# Patient Record
Sex: MALE | Race: WHITE | NOT HISPANIC OR LATINO | Employment: FULL TIME | URBAN - METROPOLITAN AREA
[De-identification: names, ages, dates, MRNs, and addresses within clinical notes are randomized per-mention and may not be internally consistent; named-entity substitution may affect disease eponyms.]

---

## 2019-12-26 ENCOUNTER — TRANSFERRED RECORDS (OUTPATIENT)
Dept: HEALTH INFORMATION MANAGEMENT | Facility: CLINIC | Age: 19
End: 2019-12-26

## 2020-01-08 ENCOUNTER — HOSPITAL ENCOUNTER (OUTPATIENT)
Dept: PHYSICAL THERAPY | Facility: HOSPITAL | Age: 20
Setting detail: THERAPIES SERIES
End: 2020-01-08
Attending: NURSE PRACTITIONER
Payer: COMMERCIAL

## 2020-01-08 PROCEDURE — 97140 MANUAL THERAPY 1/> REGIONS: CPT | Mod: GP | Performed by: PHYSICAL THERAPIST

## 2020-01-08 PROCEDURE — 97161 PT EVAL LOW COMPLEX 20 MIN: CPT | Mod: GP | Performed by: PHYSICAL THERAPIST

## 2020-01-08 PROCEDURE — 97110 THERAPEUTIC EXERCISES: CPT | Mod: GP | Performed by: PHYSICAL THERAPIST

## 2020-01-09 NOTE — PROGRESS NOTES
01/08/20 1400   General Information   Type of Visit Initial OP Ortho PT Evaluation   Start of Care Date 01/08/20   Referring Physician Carina GAGE   Patient/Family Goals Statement less pain, better mobility   Orders Evaluate and Treat   Date of Order 12/28/19   Certification Required? No   Medical Diagnosis Rib pain, pectus excavatum   Surgical/Medical history reviewed Yes   Precautions/Limitations no known precautions/limitations   Body Part(s)   Body Part(s) Cervical Spine   Presentation and Etiology   Pertinent history of current problem (include personal factors and/or comorbidities that impact the POC) Patient reports he has been experiencing increased pain and difficulty with posture over the past few years. He was congenital pectus excavatum and has not had any intervention to address the tissue and joint mobility restrictions associated with it. He states he does not sleep well at night, has headaches and has ongoing discomfort in his ribs. He had a massage with focus on getting his diaphragm to move better last week and states that helped with a lot of his pain. He feels his posture has improved. He is coming to PT to further address his pain and posture issues and see what else he can do to decrease his discomfort and mobility deficits. He has also had headaches for many years since [employer] and states he has just learned to deal with them. Feels his headaches sit at the base of his skull   Impairments A. Pain;N. Headaches   Functional Limitations perform activities of daily living;perform required work activities;perform desired leisure / sports activities   Symptom Location B chest, ribs, sternum, headaches   Onset date of current episode/exacerbation 12/28/19  (physician order date)   Chronicity Chronic   Pain rating (0-10 point scale) Best (/10);Worst (/10)   Best (/10) 1   Worst (/10) 4   Pain quality G. Cramping   Frequency of pain/symptoms C. With activity   Pain/symptoms are: Worse during  the night   Pain/symptoms exacerbated by A. Sitting;C. Lifting   Pain/symptoms eased by D. Nothing   Progression of symptoms since onset: Improved   Current Level of Function   Current Community Support Family/friend caregiver   Patient role/employment history A. Employed;B. 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 No   Have you fallen and had an injury in the past year? No   Is patient a fall risk? No   Cervical Spine   Observation pectus excavatum, straight thoracic spine, decreased thoracic kyphosis   Posture Rounded shoulders   Cervical Flexion ROM WFL + pull   Cervical Extension ROM WFL   Cervical Right Side Bending ROM min sanabria    Cervical Left Side Bending ROM min sanabria   Cervical Right Rotation ROM min sanabria   Cervical Left Rotation ROM min sanabria   Thoracic Flexion ROM mod sanabria + tightness in chest   Thoracic Extension ROM mod sanabria   Thoracic Right Side Bending ROM min sanabria   Thoracic Left Sidebending ROM  min sanabria   Thoracic Right Rotation min sanabria   Thoracic Left Rotation min sanabria   Shoulder AROM Screen WFL - decreased SA activity noted with IR with scapular winging   Upper Trapezius Flexibility hypo++   Levator Scapula Flexibility hypo   Scalene Flexibility hypo   Pectoralis Minor Flexibility hypo++   Vertebral Artery Test neg   Alar Ligament Test neg   Transverse Ligament Test neg   Cervical/Shoulder Special Tests Comments Breathing: upper chest breathing and decreased mobility of L ribs   Segmental Mobility-Cervical hypo at O-C1, C1-C2, CTJ   Segmental Mobility-Thoracic Hypo T1-T8   Palpation TTP and increased tone in B UTs, levator, rhomboids, pec minor and intercostals   Planned Therapy Interventions   Planned Therapy Interventions joint mobilization;manual therapy;ROM;strengthening;stretching   Planned Modality Interventions   Planned Modality Interventions  Comments as needed   Clinical Impression   Criteria for Skilled Therapeutic Interventions Met yes, treatment indicated   PT Diagnosis Rib pain, pectus excavatum   Influenced by the following impairments pain and tissue restrictions   Functional limitations due to impairments difficulty performing home, work and recreational activities   Clinical Presentation Stable/Uncomplicated   Clinical Presentation Rationale due to lack of additional comorbidities that may influence anticipated PT progress   Clinical Decision Making (Complexity) Low complexity   Therapy Frequency 2 times/Week   Predicted Duration of Therapy Intervention (days/wks) up to 8 weeks   Risk & Benefits of therapy have been explained Yes   Patient, Family & other staff in agreement with plan of care Yes   Clinical Impression Comments Presentation is consistent with B rib pain associated with pectus excavatum that is epxected to improve with skilled PT intervention   Education Assessment   Preferred Learning Style Demonstration   Barriers to Learning No barriers   ORTHO GOALS   PT Ortho Eval Goals 1;2;3   Ortho Goal 1   Goal Identifier STG 1   Goal Description Patient will be compliant with HEP in order to make progress while at home   Target Date 01/29/20   Ortho Goal 2   Goal Identifier LTG 1   Goal Description Patient will report overall 50% improvement in his rib pain and headaches in order to improve his safety with home, work and recreational tadks   Target Date 02/12/20   Ortho Goal 3   Goal Identifier LTG 2   Goal Description Patient will report overall 80% or more improvement in rib pain and headaches in order to perform all home, work and recreational tasks safely   Target Date 03/04/20   Total Evaluation Time   PT Vero Low Complexity Minutes (23222) 15

## 2020-01-20 ENCOUNTER — HOSPITAL ENCOUNTER (OUTPATIENT)
Dept: PHYSICAL THERAPY | Facility: HOSPITAL | Age: 20
Setting detail: THERAPIES SERIES
End: 2020-01-20
Attending: FAMILY MEDICINE
Payer: COMMERCIAL

## 2020-01-20 PROCEDURE — 97110 THERAPEUTIC EXERCISES: CPT | Mod: GP | Performed by: PHYSICAL THERAPIST

## 2020-01-20 PROCEDURE — 97140 MANUAL THERAPY 1/> REGIONS: CPT | Mod: GP | Performed by: PHYSICAL THERAPIST

## 2020-01-27 ENCOUNTER — HOSPITAL ENCOUNTER (OUTPATIENT)
Dept: PHYSICAL THERAPY | Facility: HOSPITAL | Age: 20
Setting detail: THERAPIES SERIES
End: 2020-01-27
Attending: FAMILY MEDICINE
Payer: COMMERCIAL

## 2020-01-27 PROCEDURE — 97140 MANUAL THERAPY 1/> REGIONS: CPT | Mod: GP | Performed by: PHYSICAL THERAPIST

## 2020-01-27 PROCEDURE — 97110 THERAPEUTIC EXERCISES: CPT | Mod: GP | Performed by: PHYSICAL THERAPIST

## 2020-01-31 ENCOUNTER — HOSPITAL ENCOUNTER (OUTPATIENT)
Dept: PHYSICAL THERAPY | Facility: HOSPITAL | Age: 20
Setting detail: THERAPIES SERIES
End: 2020-01-31
Attending: FAMILY MEDICINE
Payer: COMMERCIAL

## 2020-01-31 PROCEDURE — 97140 MANUAL THERAPY 1/> REGIONS: CPT | Mod: GP | Performed by: PHYSICAL THERAPIST

## 2020-01-31 PROCEDURE — 97110 THERAPEUTIC EXERCISES: CPT | Mod: GP | Performed by: PHYSICAL THERAPIST

## 2020-02-03 ENCOUNTER — HOSPITAL ENCOUNTER (OUTPATIENT)
Dept: PHYSICAL THERAPY | Facility: HOSPITAL | Age: 20
Setting detail: THERAPIES SERIES
End: 2020-02-03
Attending: FAMILY MEDICINE
Payer: COMMERCIAL

## 2020-02-03 PROCEDURE — 97110 THERAPEUTIC EXERCISES: CPT | Mod: GP | Performed by: PHYSICAL THERAPIST

## 2020-02-10 ENCOUNTER — HOSPITAL ENCOUNTER (OUTPATIENT)
Dept: PHYSICAL THERAPY | Facility: HOSPITAL | Age: 20
Setting detail: THERAPIES SERIES
End: 2020-02-10
Attending: FAMILY MEDICINE
Payer: COMMERCIAL

## 2020-02-10 PROCEDURE — 97140 MANUAL THERAPY 1/> REGIONS: CPT | Mod: GP | Performed by: PHYSICAL THERAPIST

## 2020-02-10 PROCEDURE — 97110 THERAPEUTIC EXERCISES: CPT | Mod: GP | Performed by: PHYSICAL THERAPIST

## 2020-02-14 ENCOUNTER — HOSPITAL ENCOUNTER (OUTPATIENT)
Dept: PHYSICAL THERAPY | Facility: HOSPITAL | Age: 20
Setting detail: THERAPIES SERIES
End: 2020-02-14
Attending: FAMILY MEDICINE
Payer: COMMERCIAL

## 2020-02-14 PROCEDURE — 97110 THERAPEUTIC EXERCISES: CPT | Mod: GP | Performed by: PHYSICAL THERAPIST

## 2020-02-14 PROCEDURE — 97140 MANUAL THERAPY 1/> REGIONS: CPT | Mod: GP | Performed by: PHYSICAL THERAPIST

## 2020-02-25 ENCOUNTER — HOSPITAL ENCOUNTER (OUTPATIENT)
Dept: PHYSICAL THERAPY | Facility: HOSPITAL | Age: 20
Setting detail: THERAPIES SERIES
End: 2020-02-25
Attending: FAMILY MEDICINE
Payer: COMMERCIAL

## 2020-02-25 PROCEDURE — 97140 MANUAL THERAPY 1/> REGIONS: CPT | Mod: GP | Performed by: PHYSICAL THERAPIST

## 2020-02-25 PROCEDURE — 97110 THERAPEUTIC EXERCISES: CPT | Mod: GP | Performed by: PHYSICAL THERAPIST

## 2020-03-02 ENCOUNTER — HOSPITAL ENCOUNTER (OUTPATIENT)
Dept: PHYSICAL THERAPY | Facility: HOSPITAL | Age: 20
Setting detail: THERAPIES SERIES
End: 2020-03-02
Attending: FAMILY MEDICINE
Payer: COMMERCIAL

## 2020-03-02 PROCEDURE — 97140 MANUAL THERAPY 1/> REGIONS: CPT | Mod: GP | Performed by: PHYSICAL THERAPIST

## 2020-03-02 PROCEDURE — 97110 THERAPEUTIC EXERCISES: CPT | Mod: GP | Performed by: PHYSICAL THERAPIST

## 2020-03-09 ENCOUNTER — HOSPITAL ENCOUNTER (OUTPATIENT)
Dept: PHYSICAL THERAPY | Facility: HOSPITAL | Age: 20
Setting detail: THERAPIES SERIES
End: 2020-03-09
Attending: FAMILY MEDICINE
Payer: COMMERCIAL

## 2020-03-09 PROCEDURE — 97110 THERAPEUTIC EXERCISES: CPT | Mod: GP | Performed by: PHYSICAL THERAPIST

## 2020-03-09 PROCEDURE — 97140 MANUAL THERAPY 1/> REGIONS: CPT | Mod: GP | Performed by: PHYSICAL THERAPIST

## 2020-03-09 NOTE — PROGRESS NOTES
Outpatient Physical Therapy Discharge Note     Patient: Julio Muñiz  : 2000    Beginning/End Dates of Reporting Period:  2020 to 3/9/2020    Referring Provider: Dr Crenshaw    Therapy Diagnosis: Rib pain, pectus excavatum     Client Self Report: States he has been feeling very good lately, no rib pain, his breathing has been good and he has been doing well with the exercises. He feels he has a good home program and a good plan to carry out at the gym. He will work on his own for a few months and return to the clinic if he has difficulty    Objective Measurements:  Objective Measure: Thoracic mobility   Details: Overall much improved thoracic mobility with no pain. Performs all HEP without difficulty. Reports 0/10 pain in ribs and chest today      Goals:  Goal Identifier STG 1   Goal Description Patient will be compliant with HEP in order to make progress while at home   Target Date 20   Date Met  20   Progress:     Goal Identifier LTG 1   Goal Description Patient will report overall 50% improvement in his rib pain and headaches in order to improve his safety with home, work and recreational tadks   Target Date 20   Date Met  20   Progress:     Goal Identifier LTG 2   Goal Description Patient will report overall 80% or more improvement in rib pain and headaches in order to perform all home, work and recreational tasks safely   Target Date 20   Date Met  20   Progress:       Progress Toward Goals:   Progress this reporting period: patient has met goals      Plan:  Discharge from therapy.    Discharge:    Reason for Discharge: No further expectation of progress.    Equipment Issued: None    Discharge Plan: Patient to continue home program and can return to PT if needed within the year timeframe

## 2020-05-02 ENCOUNTER — HOSPITAL ENCOUNTER (EMERGENCY)
Facility: HOSPITAL | Age: 20
Discharge: HOME | End: 2020-05-02
Attending: NURSE PRACTITIONER | Admitting: NURSE PRACTITIONER
Payer: COMMERCIAL

## 2020-05-02 VITALS
OXYGEN SATURATION: 99 % | SYSTOLIC BLOOD PRESSURE: 142 MMHG | TEMPERATURE: 97.7 F | RESPIRATION RATE: 17 BRPM | DIASTOLIC BLOOD PRESSURE: 81 MMHG | HEART RATE: 72 BPM

## 2020-05-02 DIAGNOSIS — J02.9 ACUTE PHARYNGITIS, UNSPECIFIED ETIOLOGY: ICD-10-CM

## 2020-05-02 LAB
BASOPHILS # BLD AUTO: 0 10E9/L (ref 0–0.2)
BASOPHILS NFR BLD AUTO: 0 %
DIFFERENTIAL METHOD BLD: NORMAL
EOSINOPHIL # BLD AUTO: 0.4 10E9/L (ref 0–0.7)
EOSINOPHIL NFR BLD AUTO: 5 %
ERYTHROCYTE [DISTWIDTH] IN BLOOD BY AUTOMATED COUNT: 12.1 % (ref 10–15)
HCT VFR BLD AUTO: 50.9 % (ref 40–53)
HETEROPH AB SER QL: NEGATIVE
HGB BLD-MCNC: 17.5 G/DL (ref 13.3–17.7)
LYMPHOCYTES # BLD AUTO: 1.6 10E9/L (ref 0.8–5.3)
LYMPHOCYTES NFR BLD AUTO: 19 %
MCH RBC QN AUTO: 31.9 PG (ref 26.5–33)
MCHC RBC AUTO-ENTMCNC: 34.4 G/DL (ref 31.5–36.5)
MCV RBC AUTO: 93 FL (ref 78–100)
MONOCYTES # BLD AUTO: 0.4 10E9/L (ref 0–1.3)
MONOCYTES NFR BLD AUTO: 5 %
NEUTROPHILS # BLD AUTO: 6 10E9/L (ref 1.6–8.3)
NEUTROPHILS NFR BLD AUTO: 71 %
PLATELET # BLD AUTO: 202 10E9/L (ref 150–450)
PLATELET # BLD EST: NORMAL 10*3/UL
RBC # BLD AUTO: 5.48 10E12/L (ref 4.4–5.9)
RBC MORPH BLD: NORMAL
SPECIMEN SOURCE: NORMAL
STREP GROUP A PCR: NOT DETECTED
WBC # BLD AUTO: 8.5 10E9/L (ref 4–11)

## 2020-05-02 PROCEDURE — 87651 STREP A DNA AMP PROBE: CPT | Performed by: EMERGENCY MEDICINE

## 2020-05-02 PROCEDURE — 99203 OFFICE O/P NEW LOW 30 MIN: CPT | Mod: Z6 | Performed by: NURSE PRACTITIONER

## 2020-05-02 PROCEDURE — 36415 COLL VENOUS BLD VENIPUNCTURE: CPT | Performed by: NURSE PRACTITIONER

## 2020-05-02 PROCEDURE — 85025 COMPLETE CBC W/AUTO DIFF WBC: CPT | Performed by: NURSE PRACTITIONER

## 2020-05-02 PROCEDURE — G0463 HOSPITAL OUTPT CLINIC VISIT: HCPCS

## 2020-05-02 PROCEDURE — 86308 HETEROPHILE ANTIBODY SCREEN: CPT | Performed by: NURSE PRACTITIONER

## 2020-05-02 RX ORDER — AMOXICILLIN 500 MG/1
500 CAPSULE ORAL 2 TIMES DAILY
Qty: 20 CAPSULE | Refills: 0 | Status: SHIPPED | OUTPATIENT
Start: 2020-05-02 | End: 2020-05-12

## 2020-05-02 ASSESSMENT — ENCOUNTER SYMPTOMS
LIGHT-HEADEDNESS: 0
FATIGUE: 1
SORE THROAT: 1
SINUS PRESSURE: 1
TROUBLE SWALLOWING: 1
CHILLS: 0
HEADACHES: 1
COUGH: 0
FEVER: 0
DIZZINESS: 0
SHORTNESS OF BREATH: 0
EYES NEGATIVE: 1
VOICE CHANGE: 1
NAUSEA: 0
SINUS PAIN: 1
ACTIVITY CHANGE: 1
RHINORRHEA: 1
DIARRHEA: 0
FACIAL SWELLING: 0
VOMITING: 0

## 2020-05-02 NOTE — DISCHARGE INSTRUCTIONS
Increase oral intake, cool mist vaporizer as needed, rest, avoid sharing utensils, practice good hand washing techniques, cover mouth when you cough and sneeze. Throw toothbursh away 24 hours after starting antibiotics.  Over the counter medications such as ibuprofen and/or acetaminophen for fever and generalized aches and pains. Ibuprofen 400 to 800 mg (2 - 4 tabs of over the counter med) every six to eight hours as needed;not to exceed maximum amount of 3200 mg in 24 hours.Tylenol 650 to 1000 mg every four to six hours as needed (not to exceed more than 4000 mg in a 24 hour period). May use interchangeably. Robitussin (guaifenesin) for cough. Chest rubs such as Patel's or Mentholatum may help reduce sore throat symptoms.  Chloraseptic spray for sore throat or menthol lozenges may be helpful for sore throat. Be reevaluated if symptoms persist longer than 10 - 14 days or worsen and if there is no improvement in 72 hours or worsening of symptoms.  Increase fluids. Complete all antibiotics even if feeling better. Taking antibiotics with food may decrease the stomach upset that can occur when taking antibiotics. Antibiotics frequently cause diarrhea. Probiotics or yogurt may help prevent or decrease these symptoms.     OTC decongestants (oral or topical).  Decongestants (oral or topical) cause vasoconstriction of the nasal mucosa.  We prefer oral pseudoephedrine to phenylephrine and other oral OTC nasal decongestants. Side effects of oral decongestants may include tachycardia, elevated diastolic blood pressure, and palpitations. Pseudoephedrine 30 to 60 mg every four to six hours as needed for congestion. (Maximum dose is 240 mg in 24 hours). Do not use longer than 72 hours.    Commonly used topical decongestants include oxymetazoline, xylometazoline, and phenylephrine. Side effects of topical decongestants include nosebleeds and drying of the nasal membranes. Topical decongestants should only be used for two to three  days; longer use may result in rebound nasal congestion after discontinuation.    Fluids, herbs, and foods for sore throat relief -- Adjusting the temperature and texture of foods and beverages may provide local relief of sore throat pain. While data showing benefit are quite limited, these approaches are intuitive. We typically advise these measures since they are likely to be safe with minimal adverse effect, and patients often describe relief of symptoms.  We suggest hydration with frozen (eg, ice or popsicles) or heated liquids (eg, teas, soups), rather than room temperature or refrigerated fluids in patient with significant sore throat pain. Very cold foods can have a numbing-like effect that temporarily reduces or alleviates the pain of swallowing. Ice cubes or frozen popsicles facilitate hydration; ice cream and frozen yogurt provide caloric intake.  Warm fluids and foods, including teas, soups, and soft non-irritating foods, may be better tolerated by patients with throat pain than irritating foods (eg, rough-textured or spicy foods) or fluids at room temperatures. Foods that coat the throat, including honey and hard candies, can facilitate intake of calories while temporarily relieving throat pain.

## 2020-05-02 NOTE — ED PROVIDER NOTES
History     Chief Complaint   Patient presents with     Pharyngitis     for 4 weeks. States wants a strep test. Rates throat pain 5/10. Denies any other symptoms or fevers.      HPI  Julio Muñiz is a 19 year old male who presents with a sore throat with painful swallowing for the past four weeks. This is accompanied per runny nose, sinus pain and pressure, voice change, headaches, and fatigue. He has not taken any OTC medications for his discomfort. No sick contacts that he is aware of. He is a non-smoker. Denies fevers, chills, nausea, vomiting, diarrhea, and shortness of breath.    Allergies:  No Known Allergies    Problem List:    There are no active problems to display for this patient.       Past Medical History:    History reviewed. No pertinent past medical history.    Past Surgical History:    History reviewed. No pertinent surgical history.    Family History:    No family history on file.    Social History:  Marital Status:  Single [1]  Social History     Tobacco Use     Smoking status: Never Smoker     Smokeless tobacco: Never Used   Substance Use Topics     Alcohol use: No     Drug use: No        Medications:    amoxicillin (AMOXIL) 500 MG capsule          Review of Systems   Constitutional: Positive for activity change and fatigue. Negative for chills and fever.   HENT: Positive for rhinorrhea, sinus pressure, sinus pain, sore throat, trouble swallowing and voice change. Negative for ear pain and facial swelling.    Eyes: Negative.    Respiratory: Negative for cough and shortness of breath.    Gastrointestinal: Negative for diarrhea, nausea and vomiting.   Skin: Negative.    Neurological: Positive for headaches. Negative for dizziness and light-headedness.       Physical Exam   BP: 142/81  Pulse: 72  Temp: 97.7  F (36.5  C)  Resp: 17  SpO2: 99 %      Physical Exam  Vitals signs and nursing note reviewed.   Constitutional:       General: He is in acute distress.      Appearance: He is normal  weight.   HENT:      Head: Normocephalic.      Right Ear: Ear canal normal. A middle ear effusion is present.      Left Ear: Tympanic membrane and ear canal normal.      Nose: Mucosal edema and rhinorrhea present. Rhinorrhea is clear.      Right Turbinates: Swollen.      Right Sinus: No maxillary sinus tenderness or frontal sinus tenderness.      Left Sinus: No maxillary sinus tenderness or frontal sinus tenderness.      Mouth/Throat:      Lips: Pink.      Mouth: Mucous membranes are moist.      Pharynx: Uvula midline. Posterior oropharyngeal erythema present.      Tonsils: Tonsillar exudate present. 2+ on the right. 3+ on the left.      Comments: White lesions versus tonsil stones noted bilaterally on his tonsils. Left tonsil larger and had more lesions that right tonsil.  Eyes:      Conjunctiva/sclera: Conjunctivae normal.   Cardiovascular:      Rate and Rhythm: Normal rate and regular rhythm.      Heart sounds: Normal heart sounds. No murmur.   Pulmonary:      Effort: Pulmonary effort is normal. No respiratory distress.      Breath sounds: Normal breath sounds. No wheezing.      Comments: Has pectus excavatum chest  Lymphadenopathy:      Cervical: Cervical adenopathy (mild) present.      Right cervical: Superficial cervical adenopathy present.      Left cervical: Superficial cervical adenopathy present.   Skin:     General: Skin is warm and dry.   Neurological:      Mental Status: He is alert and oriented to person, place, and time.   Psychiatric:         Behavior: Behavior normal.         ED Course        Procedures             Results for orders placed or performed during the hospital encounter of 05/02/20 (from the past 24 hour(s))   Group A Streptococcus PCR Throat Swab    Specimen: Throat   Result Value Ref Range    Specimen Description Throat     Strep Group A PCR Not Detected NDET^Not Detected   CBC with platelets differential   Result Value Ref Range    WBC 8.5 4.0 - 11.0 10e9/L    RBC Count 5.48 4.4 -  5.9 10e12/L    Hemoglobin 17.5 13.3 - 17.7 g/dL    Hematocrit 50.9 40.0 - 53.0 %    MCV 93 78 - 100 fl    MCH 31.9 26.5 - 33.0 pg    MCHC 34.4 31.5 - 36.5 g/dL    RDW 12.1 10.0 - 15.0 %    Platelet Count 202 150 - 450 10e9/L    Diff Method Manual Differential     % Neutrophils 71.0 %    % Lymphocytes 19.0 %    % Monocytes 5.0 %    % Eosinophils 5.0 %    % Basophils 0.0 %    Absolute Neutrophil 6.0 1.6 - 8.3 10e9/L    Absolute Lymphocytes 1.6 0.8 - 5.3 10e9/L    Absolute Monocytes 0.4 0.0 - 1.3 10e9/L    Absolute Eosinophils 0.4 0.0 - 0.7 10e9/L    Absolute Basophils 0.0 0.0 - 0.2 10e9/L    RBC Morphology Consistent with reported results     Platelet Estimate Confirming automated cell count    Mononucleosis screen   Result Value Ref Range    Mononucleosis Screen Negative NEG^Negative       Medications - No data to display    Assessments & Plan (with Medical Decision Making)     I have reviewed the nursing notes.    I have reviewed the findings, diagnosis, plan and need for follow up with the patient.  (J02.9) Acute pharyngitis, unspecified etiology  Comment:  19 year old male who presents with a sore throat with painful swallowing for the past four weeks. This is accompanied per runny nose, sinus pain and pressure, voice change, headaches, and fatigue. He has not taken any OTC medications for his discomfort. No sick contacts that he is aware of. He is a non-smoker. Denies fevers, chills, nausea, vomiting, diarrhea, and shortness of breath.    Assessment: White lesions versus tonsil stones noted bilaterally on his tonsils. Left tonsil larger and had more lesions that right tonsil. Oropharyngeal cavity erythematous. Mild anterior cervical adenopathy. Pectus excavatum chest.    CBC, Mono, and strep screen negative.    Plan: Amoxicillin bid for ten days. Education provided on this/these medication and for sore throats.  Treat symptoms conservatively with acetaminophen and  ibuprofen (if applicable) for fevers, body  aches, and headaches, guaifenesin and/or honey for cough. May use chest rubs for sore throat and congestion, hot and cold liquids may help decrease sore throat and help you feel better. Increase fluids. You may utilize pseudoephedrine for congestion. Return to be reevaluated by ER/UC or your primary care provider if symptoms worsen, you develop breathing difficulties, or you do not improve in a reasonable time frame. It can take several days for a cough to resolve. It can take ten to fourteen days for upper respiratory symptoms to resolve.   These discharge instructions and medications were reviewed with him and understanding verbalized.    Discharge Medication List as of 5/2/2020 12:49 PM      START taking these medications    Details   amoxicillin (AMOXIL) 500 MG capsule Take 1 capsule (500 mg) by mouth 2 times daily for 10 days, Disp-20 capsule,R-0, E-Prescribe             Final diagnoses:   Acute pharyngitis, unspecified etiology       5/2/2020   [facility]       Melodie Melendez, CNP  05/03/20 0182

## 2020-05-02 NOTE — ED TRIAGE NOTES
Pt presents with a sore throat with white spots on his tonsils for 1 month. States he thought that it was only allergies.

## 2020-05-02 NOTE — ED AVS SNAPSHOT
[facility]  [address]  [place] [address]  Phone:  [phone]                                    Julio Muñiz   MRN: [id number]    Department:  [facility]   Date of Visit:  5/2/2020           After Visit Summary Signature Page    I have received my discharge instructions, and my questions have been answered. I have discussed any challenges I see with this plan with the nurse or doctor.    ..........................................................................................................................................  Patient/Patient Representative Signature      ..........................................................................................................................................  Patient Representative Print Name and Relationship to Patient    ..................................................               ................................................  Date                                   Time    ..........................................................................................................................................  Reviewed by Signature/Title    ...................................................              ..............................................  Date                                               Time          22EPIC Rev 08/18

## 2020-07-23 ENCOUNTER — TRANSFERRED RECORDS (OUTPATIENT)
Dept: HEALTH INFORMATION MANAGEMENT | Facility: CLINIC | Age: 20
End: 2020-07-23

## 2020-07-27 ENCOUNTER — PRE VISIT (OUTPATIENT)
Dept: ONCOLOGY | Facility: CLINIC | Age: 20
End: 2020-07-27

## 2020-07-28 ENCOUNTER — MEDICAL CORRESPONDENCE (OUTPATIENT)
Dept: CT IMAGING | Facility: HOSPITAL | Age: 20
End: 2020-07-28

## 2020-07-31 ENCOUNTER — TELEPHONE (OUTPATIENT)
Dept: SURGERY | Facility: CLINIC | Age: 20
End: 2020-07-31

## 2020-07-31 NOTE — TELEPHONE ENCOUNTER
Left l for Pt to contact scheduling to set up video visit with Dr. Alvarez per IB request (See below)                Please schedule a video visit or in person visit with Dr. Alvarez on 8/12/20 for pectus excavatum.     Thank you,   Mary

## 2020-07-31 NOTE — TELEPHONE ENCOUNTER
ONCOLOGY INTAKE: Records Information      APPT INFORMATION:  Referring provider:  N/a  Referring provider s clinic:  N/a  Reason for visit/diagnosis: pectus excavatum  Has patient been notified of appointment date and time?: Yes    RECORDS INFORMATION:  Were the records received with the referral (via [employer])? No    Has patient been seen for any external appt for this diagnosis? No    If yes, where? N/a    Has patient had any imaging or procedures outside of [facility] for this condition? No      If Yes, where? N/a    ADDITIONAL INFORMATION:  Per IB request

## 2020-08-07 ENCOUNTER — HOSPITAL ENCOUNTER (OUTPATIENT)
Dept: CT IMAGING | Facility: HOSPITAL | Age: 20
Discharge: HOME | End: 2020-08-07
Attending: FAMILY MEDICINE | Admitting: FAMILY MEDICINE
Payer: COMMERCIAL

## 2020-08-07 DIAGNOSIS — Q67.6 PECTUS EXCAVATUM: ICD-10-CM

## 2020-08-07 PROCEDURE — 71250 CT THORAX DX C-: CPT | Mod: TC

## 2020-08-12 ENCOUNTER — PRE VISIT (OUTPATIENT)
Dept: SURGERY | Facility: CLINIC | Age: 20
End: 2020-08-12

## 2020-08-12 ENCOUNTER — VIRTUAL VISIT (OUTPATIENT)
Dept: SURGERY | Facility: CLINIC | Age: 20
End: 2020-08-12
Attending: THORACIC SURGERY (CARDIOTHORACIC VASCULAR SURGERY)
Payer: COMMERCIAL

## 2020-08-12 DIAGNOSIS — Q67.6 PECTUS EXCAVATUM: Primary | ICD-10-CM

## 2020-08-12 PROCEDURE — 40001009 ZZH VIDEO/TELEPHONE VISIT; NO CHARGE

## 2020-08-12 NOTE — PROGRESS NOTES
"Julio Muñiz is a 19 year old male who is being evaluated via a billable video visit.      The patient has been notified of following:     \"This video visit will be conducted via a call between you and your physician/provider. We have found that certain health care needs can be provided without the need for an in-person physical exam.  This service lets us provide the care you need with a video conversation.  If a prescription is necessary we can send it directly to your pharmacy.  If lab work is needed we can place an order for that and you can then stop by our lab to have the test done at a later time.    Video visits are billed at different rates depending on your insurance coverage.  Please reach out to your insurance provider with any questions.    If during the course of the call the physician/provider feels a video visit is not appropriate, you will not be charged for this service.\"    Patient has given verbal consent for Video visit? Yes    How would you like to obtain your AVS? MyChart     If you are dropped from the video visit, the video invite should be resent to:     Will anyone else be joining your video visit? No          I have reviewed and updated the patient's allergies and medication list. Patient was asked to provide any patient recorded vital signs, height and/or weight.  Please see \"Patient Reported Vital Signs\" tab for that information.        Concerns: Patient has no new concerns.        Refills: None       JOSE ELIAS Turner            Video-Visit Details    Type of service:  Video Visit    Video Start Time: 4:41 pm  Video End Time: 5:01 pm    Originating Location (pt. Location): Home    Distant Location (provider location):  [facility]     Platform used for Video Visit: Yaritza Bales MD    THORACIC SURGERY - NEW PATIENT OFFICE VISIT      I saw Mr. Muñiz in consultation for the evaluation and treatment of pectus excavatum.    HPI  Mr. 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 He noticed it for as long as he can remember, however it became more pronounced after his growth spurt. He occasionally has pain with exertion but his activity is very limited by exertional dyspnea. He has noticed that his exercise capacity is less than his peers. He is interested in surgical options of pectus repair.     Previsit Tests   CT scan 8/7/20: Severe pectus excavatum with Corrine index 15.          PMH    No past medical history on file.     PSH    No past surgical history on file.     Allergies   No Known Allergies      Medications  No current outpatient medications on file.     No current facility-administered medications for this visit.      ETOH None   TOB None     Physical examination  Deferred.     From a personal perspective, he lives in [facility]. He is currently in online school and works as a . His mom works at the [employer] in [place] [facility] [facility] (Q67.6) Pectus excavatum  (primary encounter diagnosis)    19 year old male patient with symptomatic pectus excavatum.  I had an extensive discussion with the patient and his family regarding the rationale for surgery, the alternatives risks and benefits of the procedure. I explained the expected hospital stay, postoperative course, recovery time, diet and activity restrictions. Informed consent was obtained and they agreed to proceed.    PLAN   I reviewed the plan as follows:  Procedure planned: Modified Ravitch repair.   Necessary Preop Tests & Appointments: Exercise echo ta [facility]. Insurance approval. Once this has been completed then we will schedule surgery.  Regional Anesthesia Plan: Paravertebral catheter  All questions were answered and Julio Muñiz and present family were in agreement with the plan.  I appreciate the opportunity to participate in the care of your patient and will keep you  updated.  Sincerely,    Maya Bales MD

## 2020-08-12 NOTE — LETTER
"    8/12/2020         RE: Julio Muñiz  [address] [place] [address]        Dear Colleague,    Thank you for referring your patient, Julio Muñiz, to the [facility]. Please see a copy of my visit note below.    Julio Muñiz is a 19 year old male who is being evaluated via a billable video visit.               I have reviewed and updated the patient's allergies and medication list. Patient was asked to provide any patient recorded vital signs, height and/or weight.  Please see \"Patient Reported Vital Signs\" tab for that information.        Concerns: Patient has no new concerns.        Refills: None       JOSE ELIAS Turner            Video-Visit Details    Type of service:  Video Visit    Video Start Time: 4:41 pm  Video End Time: 5:01 pm    Originating Location (pt. Location): Home    Distant Location (provider location):  [facility]     Platform used for Video Visit: Yaritza Bales MD    THORACIC SURGERY - NEW PATIENT OFFICE VISIT      I saw Mr. Muñiz in consultation for the evaluation and treatment of pectus excavatum.    HPI  Mr. Julio Muñiz is a 19 year old male who presents for evaluation of surgical repair of his chest wall deformity. He noticed it for as long as he can remember, however it became more pronounced after his growth spurt. He occasionally has pain with exertion but his activity is very limited by exertional dyspnea. He has noticed that his exercise capacity is less than his peers. He is interested in surgical options of pectus repair.     Previsit Tests   CT scan 8/7/20: Severe pectus excavatum with Corrine index 15.          PMH    No past medical history on file.     PSH    No past surgical history on file.     Allergies   No Known Allergies      Medications  No current outpatient medications on file.     No current facility-administered medications for this visit.      ETOH None   TOB None     Physical " examination  Deferred.     From a personal perspective, he lives in [facility]. He is currently in online school and works as a . His mom works at the [employer] in [place]    IMPRESSION (Q67.6) Pectus excavatum  (primary encounter diagnosis)    19 year old male patient with symptomatic pectus excavatum.  I had an extensive discussion with the patient and his family regarding the rationale for surgery, the alternatives risks and benefits of the procedure. I explained the expected hospital stay, postoperative course, recovery time, diet and activity restrictions. Informed consent was obtained and they agreed to proceed.    PLAN   I reviewed the plan as follows:  Procedure planned: Modified Ravitch repair.   Necessary Preop Tests & Appointments: Exercise echo ta [facility]. Insurance approval. Once this has been completed then we will schedule surgery.  Regional Anesthesia Plan: Paravertebral catheter  All questions were answered and Julio Muñiz and present family were in agreement with the plan.  I appreciate the opportunity to participate in the care of your patient and will keep you updated.  Sincerely,    Maya Bales MD

## 2020-08-13 DIAGNOSIS — Q67.6 PECTUS EXCAVATUM: Primary | ICD-10-CM

## 2020-08-18 ENCOUNTER — HOSPITAL ENCOUNTER (OUTPATIENT)
Dept: CARDIOLOGY | Facility: OTHER | Age: 20
Discharge: HOME | End: 2020-08-18
Attending: CLINICAL NURSE SPECIALIST | Admitting: CLINICAL NURSE SPECIALIST
Payer: COMMERCIAL

## 2020-08-18 VITALS — OXYGEN SATURATION: 95 % | TEMPERATURE: 97 F

## 2020-08-18 DIAGNOSIS — Q67.6 PECTUS EXCAVATUM: ICD-10-CM

## 2020-08-18 PROCEDURE — 93325 DOPPLER ECHO COLOR FLOW MAPG: CPT | Mod: 26 | Performed by: INTERNAL MEDICINE

## 2020-08-18 PROCEDURE — 93325 DOPPLER ECHO COLOR FLOW MAPG: CPT | Mod: TC

## 2020-08-18 PROCEDURE — 25500064 ZZH RX 255 OP 636: Performed by: CLINICAL NURSE SPECIALIST

## 2020-08-18 PROCEDURE — 93018 CV STRESS TEST I&R ONLY: CPT | Performed by: INTERNAL MEDICINE

## 2020-08-18 PROCEDURE — 93350 STRESS TTE ONLY: CPT | Mod: 26 | Performed by: INTERNAL MEDICINE

## 2020-08-18 PROCEDURE — 93016 CV STRESS TEST SUPVJ ONLY: CPT | Performed by: INTERNAL MEDICINE

## 2020-08-18 PROCEDURE — 93321 DOPPLER ECHO F-UP/LMTD STD: CPT | Mod: 26 | Performed by: INTERNAL MEDICINE

## 2020-08-18 RX ADMIN — PERFLUTREN 4 ML: 6.52 INJECTION, SUSPENSION INTRAVENOUS at 11:00

## 2020-08-18 NOTE — PROGRESS NOTES
10:00:  The patient arrived for a stress echo.  The procedure, risks and benefits were discussed and the consent was signed.  The patient was prepped for the stress test, and an IV was placed per procedure.  The echo sonographer did the initial images with definity for image enhancement.    arrived, and the patient biked 14 minutes and 0 seconds.  The patient tolerated the procedure.  Stress images were completed and the IV was removed per procedure.  The patient was released in stable condition.  The patient was instructed that the ordering MD will call with results in one to two days.  Please see the chart for complete test results.

## 2020-08-31 ENCOUNTER — PREP FOR PROCEDURE (OUTPATIENT)
Dept: SURGERY | Facility: CLINIC | Age: 20
End: 2020-08-31

## 2020-08-31 DIAGNOSIS — Q67.6 PECTUS EXCAVATUM: Primary | ICD-10-CM

## 2020-08-31 RX ORDER — CEFAZOLIN SODIUM 1 G/50ML
1 INJECTION, SOLUTION INTRAVENOUS SEE ADMIN INSTRUCTIONS
Status: CANCELLED | OUTPATIENT
Start: 2020-08-31

## 2020-08-31 RX ORDER — ACETAMINOPHEN 325 MG/1
975 TABLET ORAL ONCE
Status: CANCELLED | OUTPATIENT
Start: 2020-08-31 | End: 2020-08-31

## 2020-08-31 RX ORDER — CEFAZOLIN SODIUM 2 G/50ML
2 SOLUTION INTRAVENOUS
Status: CANCELLED | OUTPATIENT
Start: 2020-08-31

## 2020-09-03 DIAGNOSIS — Z11.59 ENCOUNTER FOR SCREENING FOR OTHER VIRAL DISEASES: Primary | ICD-10-CM

## 2020-09-16 ENCOUNTER — TELEPHONE (OUTPATIENT)
Dept: SURGERY | Facility: CLINIC | Age: 20
End: 2020-09-16

## 2020-09-16 ENCOUNTER — MYC MEDICAL ADVICE (OUTPATIENT)
Dept: SURGERY | Facility: CLINIC | Age: 20
End: 2020-09-16

## 2020-09-16 NOTE — TELEPHONE ENCOUNTER
Spoke with patient to schedule procedure with Dr. Maya Ospina   Procedure was scheduled on 10/05 at [facility] OR  Patient will have H&P at  [facility]     Patient is aware a COVID-19 test is needed before their procedure. The test should be with-in 4 days of their procedure.   Test Details: Date 10/01 Location [facility]    Patient is aware a / is needed day of surgery.   Surgery letter was sent via [employer], patient has my direct contact information for any further questions.

## 2020-09-17 ENCOUNTER — TRANSFERRED RECORDS (OUTPATIENT)
Dept: HEALTH INFORMATION MANAGEMENT | Facility: CLINIC | Age: 20
End: 2020-09-17

## 2020-09-17 LAB
CREAT SERPL-MCNC: 1.2 MG/DL (ref 0.8–1.5)
GFR SERPL CREATININE-BSD FRML MDRD: >60 ML/MIN/1.73M2
GLUCOSE SERPL-MCNC: 94 MG/DL (ref 60–99)
INR PPP: 1 (ref 0.9–1.2)
POTASSIUM SERPL-SCNC: 4.4 MMOL/L (ref 3.5–5.1)

## 2020-09-28 ENCOUNTER — TELEPHONE (OUTPATIENT)
Dept: SURGERY | Facility: CLINIC | Age: 20
End: 2020-09-28

## 2020-09-28 NOTE — TELEPHONE ENCOUNTER
Called patient to let him know he needs a H & P by his PCP prior to surgery. Left him a voicemail with my call back information.    Talked with patient and he had gone to [facility] to have his H & P done by Dr. Kothari. Called the clinic and they are going to fax Dr. Kothari's clinic notes to us.    P: [phone]  F: [phone]

## 2020-10-05 ENCOUNTER — OFFICE VISIT (OUTPATIENT)
Dept: FAMILY MEDICINE | Facility: OTHER | Age: 20
End: 2020-10-05
Attending: FAMILY MEDICINE
Payer: COMMERCIAL

## 2020-10-05 DIAGNOSIS — Z11.59 ENCOUNTER FOR SCREENING FOR OTHER VIRAL DISEASES: ICD-10-CM

## 2020-10-05 PROCEDURE — U0003 INFECTIOUS AGENT DETECTION BY NUCLEIC ACID (DNA OR RNA); SEVERE ACUTE RESPIRATORY SYNDROME CORONAVIRUS 2 (SARS-COV-2) (CORONAVIRUS DISEASE [COVID-19]), AMPLIFIED PROBE TECHNIQUE, MAKING USE OF HIGH THROUGHPUT TECHNOLOGIES AS DESCRIBED BY CMS-2020-01-R: HCPCS | Performed by: FAMILY MEDICINE

## 2020-10-06 LAB
SARS-COV-2 RNA SPEC QL NAA+PROBE: NOT DETECTED
SPECIMEN SOURCE: NORMAL

## 2020-10-07 ENCOUNTER — ANESTHESIA EVENT (OUTPATIENT)
Dept: SURGERY | Facility: CLINIC | Age: 20
DRG: 516 | End: 2020-10-07
Payer: COMMERCIAL

## 2020-10-09 ENCOUNTER — APPOINTMENT (OUTPATIENT)
Dept: GENERAL RADIOLOGY | Facility: CLINIC | Age: 20
DRG: 516 | End: 2020-10-09
Attending: THORACIC SURGERY (CARDIOTHORACIC VASCULAR SURGERY)
Payer: COMMERCIAL

## 2020-10-09 ENCOUNTER — HOSPITAL ENCOUNTER (INPATIENT)
Facility: CLINIC | Age: 20
LOS: 3 days | Discharge: HOME | DRG: 516 | End: 2020-10-12
Attending: THORACIC SURGERY (CARDIOTHORACIC VASCULAR SURGERY) | Admitting: THORACIC SURGERY (CARDIOTHORACIC VASCULAR SURGERY)
Payer: COMMERCIAL

## 2020-10-09 ENCOUNTER — ANESTHESIA (OUTPATIENT)
Dept: SURGERY | Facility: CLINIC | Age: 20
DRG: 516 | End: 2020-10-09
Payer: COMMERCIAL

## 2020-10-09 DIAGNOSIS — Q67.6 PECTUS EXCAVATUM: ICD-10-CM

## 2020-10-09 LAB
ABO + RH BLD: NORMAL
ABO + RH BLD: NORMAL
BLD GP AB SCN SERPL QL: NORMAL
BLOOD BANK CMNT PATIENT-IMP: NORMAL
BLOOD BANK CMNT PATIENT-IMP: NORMAL
GLUCOSE BLDC GLUCOMTR-MCNC: 85 MG/DL (ref 70–99)
PLATELET # BLD AUTO: 193 10E9/L (ref 150–450)
RADIOLOGIST FLAGS: ABNORMAL
RADIOLOGIST FLAGS: ABNORMAL
SPECIMEN EXP DATE BLD: NORMAL

## 2020-10-09 PROCEDURE — 250N000009 HC RX 250: Performed by: NURSE ANESTHETIST, CERTIFIED REGISTERED

## 2020-10-09 PROCEDURE — 250N000011 HC RX IP 250 OP 636: Performed by: NURSE ANESTHETIST, CERTIFIED REGISTERED

## 2020-10-09 PROCEDURE — 761N000002 HC RECOVERY PHASE 1 LEVEL 1 EA ADDTL HR: Performed by: THORACIC SURGERY (CARDIOTHORACIC VASCULAR SURGERY)

## 2020-10-09 PROCEDURE — 360N000023 HC SURGERY LEVEL 3 EA 15 ADDTL MIN UMMC: Performed by: THORACIC SURGERY (CARDIOTHORACIC VASCULAR SURGERY)

## 2020-10-09 PROCEDURE — 250N000011 HC RX IP 250 OP 636: Performed by: STUDENT IN AN ORGANIZED HEALTH CARE EDUCATION/TRAINING PROGRAM

## 2020-10-09 PROCEDURE — 272N000001 HC OR GENERAL SUPPLY STERILE: Performed by: THORACIC SURGERY (CARDIOTHORACIC VASCULAR SURGERY)

## 2020-10-09 PROCEDURE — 258N000003 HC RX IP 258 OP 636: Performed by: ANESTHESIOLOGY

## 2020-10-09 PROCEDURE — 370N000001 HC ANESTHESIA TECHNICAL FEE, 1ST 30 MIN: Performed by: THORACIC SURGERY (CARDIOTHORACIC VASCULAR SURGERY)

## 2020-10-09 PROCEDURE — 999N000065 XR CHEST PORT 1 VW

## 2020-10-09 PROCEDURE — 86850 RBC ANTIBODY SCREEN: CPT | Performed by: THORACIC SURGERY (CARDIOTHORACIC VASCULAR SURGERY)

## 2020-10-09 PROCEDURE — 21740 RECONSTRUCTION OF STERNUM: CPT | Mod: GC | Performed by: THORACIC SURGERY (CARDIOTHORACIC VASCULAR SURGERY)

## 2020-10-09 PROCEDURE — 761N000001 HC RECOVERY PHASE 1 LEVEL 1 FIRST HR: Performed by: THORACIC SURGERY (CARDIOTHORACIC VASCULAR SURGERY)

## 2020-10-09 PROCEDURE — 71045 X-RAY EXAM CHEST 1 VIEW: CPT | Mod: 26 | Performed by: RADIOLOGY

## 2020-10-09 PROCEDURE — 250N000009 HC RX 250: Performed by: STUDENT IN AN ORGANIZED HEALTH CARE EDUCATION/TRAINING PROGRAM

## 2020-10-09 PROCEDURE — 250N000011 HC RX IP 250 OP 636: Performed by: ANESTHESIOLOGY

## 2020-10-09 PROCEDURE — C1713 ANCHOR/SCREW BN/BN,TIS/BN: HCPCS | Performed by: THORACIC SURGERY (CARDIOTHORACIC VASCULAR SURGERY)

## 2020-10-09 PROCEDURE — 250N000011 HC RX IP 250 OP 636

## 2020-10-09 PROCEDURE — 85049 AUTOMATED PLATELET COUNT: CPT | Performed by: THORACIC SURGERY (CARDIOTHORACIC VASCULAR SURGERY)

## 2020-10-09 PROCEDURE — 999N001017 HC STATISTIC GLUCOSE BY METER IP

## 2020-10-09 PROCEDURE — 0PS000Z REPOSITION STERNUM WITH RIGID PLATE INTERNAL FIXATION DEVICE, OPEN APPROACH: ICD-10-PCS | Performed by: THORACIC SURGERY (CARDIOTHORACIC VASCULAR SURGERY)

## 2020-10-09 PROCEDURE — 258N000003 HC RX IP 258 OP 636: Performed by: NURSE ANESTHETIST, CERTIFIED REGISTERED

## 2020-10-09 PROCEDURE — 999N000140 HC STATISTIC PRE-PROCEDURE ASSESSMENT III: Performed by: THORACIC SURGERY (CARDIOTHORACIC VASCULAR SURGERY)

## 2020-10-09 PROCEDURE — 250N000011 HC RX IP 250 OP 636: Performed by: THORACIC SURGERY (CARDIOTHORACIC VASCULAR SURGERY)

## 2020-10-09 PROCEDURE — 250N000013 HC RX MED GY IP 250 OP 250 PS 637

## 2020-10-09 PROCEDURE — 86901 BLOOD TYPING SEROLOGIC RH(D): CPT | Performed by: THORACIC SURGERY (CARDIOTHORACIC VASCULAR SURGERY)

## 2020-10-09 PROCEDURE — 360N000022 HC SURGERY LEVEL 3 1ST 30 MIN - UMMC: Performed by: THORACIC SURGERY (CARDIOTHORACIC VASCULAR SURGERY)

## 2020-10-09 PROCEDURE — 0W9930Z DRAINAGE OF RIGHT PLEURAL CAVITY WITH DRAINAGE DEVICE, PERCUTANEOUS APPROACH: ICD-10-PCS | Performed by: THORACIC SURGERY (CARDIOTHORACIC VASCULAR SURGERY)

## 2020-10-09 PROCEDURE — 86900 BLOOD TYPING SEROLOGIC ABO: CPT | Performed by: THORACIC SURGERY (CARDIOTHORACIC VASCULAR SURGERY)

## 2020-10-09 PROCEDURE — 370N000002 HC ANESTHESIA TECHNICAL FEE, EACH ADDTL 15 MIN: Performed by: THORACIC SURGERY (CARDIOTHORACIC VASCULAR SURGERY)

## 2020-10-09 PROCEDURE — 250N000003 HC SEVOFLURANE, EA 15 MIN: Performed by: THORACIC SURGERY (CARDIOTHORACIC VASCULAR SURGERY)

## 2020-10-09 PROCEDURE — 250N000013 HC RX MED GY IP 250 OP 250 PS 637: Performed by: THORACIC SURGERY (CARDIOTHORACIC VASCULAR SURGERY)

## 2020-10-09 PROCEDURE — 120N000002 HC R&B MED SURG/OB UMMC

## 2020-10-09 PROCEDURE — 271N000002 HC RX 271: Performed by: STUDENT IN AN ORGANIZED HEALTH CARE EDUCATION/TRAINING PROGRAM

## 2020-10-09 PROCEDURE — 272N000002 HC OR SUPPLY OTHER OPNP: Performed by: THORACIC SURGERY (CARDIOTHORACIC VASCULAR SURGERY)

## 2020-10-09 RX ORDER — HYDROMORPHONE HYDROCHLORIDE 1 MG/ML
0.5 INJECTION, SOLUTION INTRAMUSCULAR; INTRAVENOUS; SUBCUTANEOUS EVERY 4 HOURS PRN
Status: DISCONTINUED | OUTPATIENT
Start: 2020-10-09 | End: 2020-10-12 | Stop reason: HOSPADM

## 2020-10-09 RX ORDER — ONDANSETRON 4 MG/1
4 TABLET, ORALLY DISINTEGRATING ORAL EVERY 30 MIN PRN
Status: DISCONTINUED | OUTPATIENT
Start: 2020-10-09 | End: 2020-10-09 | Stop reason: HOSPADM

## 2020-10-09 RX ORDER — SODIUM CHLORIDE, SODIUM LACTATE, POTASSIUM CHLORIDE, CALCIUM CHLORIDE 600; 310; 30; 20 MG/100ML; MG/100ML; MG/100ML; MG/100ML
INJECTION, SOLUTION INTRAVENOUS CONTINUOUS
Status: DISCONTINUED | OUTPATIENT
Start: 2020-10-09 | End: 2020-10-09 | Stop reason: HOSPADM

## 2020-10-09 RX ORDER — ONDANSETRON 2 MG/ML
INJECTION INTRAMUSCULAR; INTRAVENOUS PRN
Status: DISCONTINUED | OUTPATIENT
Start: 2020-10-09 | End: 2020-10-09

## 2020-10-09 RX ORDER — FLUMAZENIL 0.1 MG/ML
0.2 INJECTION, SOLUTION INTRAVENOUS
Status: DISCONTINUED | OUTPATIENT
Start: 2020-10-09 | End: 2020-10-09 | Stop reason: HOSPADM

## 2020-10-09 RX ORDER — EPHEDRINE SULFATE 50 MG/ML
INJECTION, SOLUTION INTRAMUSCULAR; INTRAVENOUS; SUBCUTANEOUS PRN
Status: DISCONTINUED | OUTPATIENT
Start: 2020-10-09 | End: 2020-10-09

## 2020-10-09 RX ORDER — FENTANYL CITRATE 50 UG/ML
INJECTION, SOLUTION INTRAMUSCULAR; INTRAVENOUS PRN
Status: DISCONTINUED | OUTPATIENT
Start: 2020-10-09 | End: 2020-10-09

## 2020-10-09 RX ORDER — LIDOCAINE 40 MG/G
CREAM TOPICAL
Status: DISCONTINUED | OUTPATIENT
Start: 2020-10-09 | End: 2020-10-09 | Stop reason: HOSPADM

## 2020-10-09 RX ORDER — DEXAMETHASONE SODIUM PHOSPHATE 4 MG/ML
INJECTION, SOLUTION INTRA-ARTICULAR; INTRALESIONAL; INTRAMUSCULAR; INTRAVENOUS; SOFT TISSUE PRN
Status: DISCONTINUED | OUTPATIENT
Start: 2020-10-09 | End: 2020-10-09

## 2020-10-09 RX ORDER — LIDOCAINE HYDROCHLORIDE 20 MG/ML
INJECTION, SOLUTION INFILTRATION; PERINEURAL PRN
Status: DISCONTINUED | OUTPATIENT
Start: 2020-10-09 | End: 2020-10-09

## 2020-10-09 RX ORDER — SODIUM CHLORIDE, SODIUM LACTATE, POTASSIUM CHLORIDE, CALCIUM CHLORIDE 600; 310; 30; 20 MG/100ML; MG/100ML; MG/100ML; MG/100ML
INJECTION, SOLUTION INTRAVENOUS CONTINUOUS PRN
Status: DISCONTINUED | OUTPATIENT
Start: 2020-10-09 | End: 2020-10-09

## 2020-10-09 RX ORDER — IBUPROFEN 600 MG/1
600 TABLET, FILM COATED ORAL EVERY 6 HOURS
Status: DISCONTINUED | OUTPATIENT
Start: 2020-10-09 | End: 2020-10-12 | Stop reason: HOSPADM

## 2020-10-09 RX ORDER — ONDANSETRON 2 MG/ML
4 INJECTION INTRAMUSCULAR; INTRAVENOUS EVERY 30 MIN PRN
Status: DISCONTINUED | OUTPATIENT
Start: 2020-10-09 | End: 2020-10-09 | Stop reason: HOSPADM

## 2020-10-09 RX ORDER — OXYCODONE HYDROCHLORIDE 5 MG/1
5 TABLET ORAL EVERY 4 HOURS PRN
Status: DISCONTINUED | OUTPATIENT
Start: 2020-10-09 | End: 2020-10-12 | Stop reason: HOSPADM

## 2020-10-09 RX ORDER — NALOXONE HYDROCHLORIDE 0.4 MG/ML
.1-.4 INJECTION, SOLUTION INTRAMUSCULAR; INTRAVENOUS; SUBCUTANEOUS
Status: DISCONTINUED | OUTPATIENT
Start: 2020-10-09 | End: 2020-10-09

## 2020-10-09 RX ORDER — ACETAMINOPHEN 325 MG/1
975 TABLET ORAL EVERY 6 HOURS
Status: DISCONTINUED | OUTPATIENT
Start: 2020-10-09 | End: 2020-10-12 | Stop reason: HOSPADM

## 2020-10-09 RX ORDER — FENTANYL CITRATE 50 UG/ML
25-50 INJECTION, SOLUTION INTRAMUSCULAR; INTRAVENOUS
Status: DISCONTINUED | OUTPATIENT
Start: 2020-10-09 | End: 2020-10-09 | Stop reason: HOSPADM

## 2020-10-09 RX ORDER — CEFAZOLIN SODIUM 2 G/100ML
2 INJECTION, SOLUTION INTRAVENOUS
Status: COMPLETED | OUTPATIENT
Start: 2020-10-09 | End: 2020-10-09

## 2020-10-09 RX ORDER — ACETAMINOPHEN 325 MG/1
975 TABLET ORAL ONCE
Status: COMPLETED | OUTPATIENT
Start: 2020-10-09 | End: 2020-10-09

## 2020-10-09 RX ORDER — LIDOCAINE HYDROCHLORIDE 10 MG/ML
30 INJECTION, SOLUTION EPIDURAL; INFILTRATION; INTRACAUDAL; PERINEURAL ONCE
Status: COMPLETED | OUTPATIENT
Start: 2020-10-09 | End: 2020-10-09

## 2020-10-09 RX ORDER — NALOXONE HYDROCHLORIDE 0.4 MG/ML
.1-.4 INJECTION, SOLUTION INTRAMUSCULAR; INTRAVENOUS; SUBCUTANEOUS
Status: DISCONTINUED | OUTPATIENT
Start: 2020-10-09 | End: 2020-10-12 | Stop reason: HOSPADM

## 2020-10-09 RX ORDER — HEPARIN SODIUM 5000 [USP'U]/.5ML
5000 INJECTION, SOLUTION INTRAVENOUS; SUBCUTANEOUS EVERY 8 HOURS
Status: DISCONTINUED | OUTPATIENT
Start: 2020-10-10 | End: 2020-10-12 | Stop reason: HOSPADM

## 2020-10-09 RX ORDER — LIDOCAINE 40 MG/G
CREAM TOPICAL
Status: DISCONTINUED | OUTPATIENT
Start: 2020-10-09 | End: 2020-10-12 | Stop reason: HOSPADM

## 2020-10-09 RX ORDER — ACETAMINOPHEN 325 MG/1
975 TABLET ORAL ONCE
Status: DISCONTINUED | OUTPATIENT
Start: 2020-10-09 | End: 2020-10-09 | Stop reason: HOSPADM

## 2020-10-09 RX ORDER — BUPIVACAINE HYDROCHLORIDE 2.5 MG/ML
INJECTION, SOLUTION EPIDURAL; INFILTRATION; INTRACAUDAL PRN
Status: DISCONTINUED | OUTPATIENT
Start: 2020-10-09 | End: 2020-10-09

## 2020-10-09 RX ORDER — KETAMINE HYDROCHLORIDE 10 MG/ML
INJECTION INTRAMUSCULAR; INTRAVENOUS PRN
Status: DISCONTINUED | OUTPATIENT
Start: 2020-10-09 | End: 2020-10-09

## 2020-10-09 RX ORDER — HYDROMORPHONE HYDROCHLORIDE 1 MG/ML
.3-.5 INJECTION, SOLUTION INTRAMUSCULAR; INTRAVENOUS; SUBCUTANEOUS EVERY 5 MIN PRN
Status: DISCONTINUED | OUTPATIENT
Start: 2020-10-09 | End: 2020-10-09 | Stop reason: HOSPADM

## 2020-10-09 RX ORDER — PROPOFOL 10 MG/ML
INJECTION, EMULSION INTRAVENOUS PRN
Status: DISCONTINUED | OUTPATIENT
Start: 2020-10-09 | End: 2020-10-09

## 2020-10-09 RX ORDER — NALOXONE HYDROCHLORIDE 0.4 MG/ML
.1-.4 INJECTION, SOLUTION INTRAMUSCULAR; INTRAVENOUS; SUBCUTANEOUS
Status: DISCONTINUED | OUTPATIENT
Start: 2020-10-09 | End: 2020-10-09 | Stop reason: HOSPADM

## 2020-10-09 RX ORDER — CEFAZOLIN SODIUM 1 G/3ML
1 INJECTION, POWDER, FOR SOLUTION INTRAMUSCULAR; INTRAVENOUS SEE ADMIN INSTRUCTIONS
Status: DISCONTINUED | OUTPATIENT
Start: 2020-10-09 | End: 2020-10-09 | Stop reason: HOSPADM

## 2020-10-09 RX ADMIN — PROPOFOL 50 MG: 10 INJECTION, EMULSION INTRAVENOUS at 11:45

## 2020-10-09 RX ADMIN — PHENYLEPHRINE HYDROCHLORIDE 100 MCG: 10 INJECTION INTRAVENOUS at 08:06

## 2020-10-09 RX ADMIN — ROCURONIUM BROMIDE 20 MG: 10 INJECTION INTRAVENOUS at 08:25

## 2020-10-09 RX ADMIN — Medication 14 ML/HR: at 09:35

## 2020-10-09 RX ADMIN — IBUPROFEN 600 MG: 600 TABLET ORAL at 16:32

## 2020-10-09 RX ADMIN — Medication 5 MG: at 08:03

## 2020-10-09 RX ADMIN — HYDROMORPHONE HYDROCHLORIDE 0.5 MG: 1 INJECTION, SOLUTION INTRAMUSCULAR; INTRAVENOUS; SUBCUTANEOUS at 09:51

## 2020-10-09 RX ADMIN — HYDROMORPHONE HYDROCHLORIDE 0.5 MG: 1 INJECTION, SOLUTION INTRAMUSCULAR; INTRAVENOUS; SUBCUTANEOUS at 15:43

## 2020-10-09 RX ADMIN — SODIUM CHLORIDE, POTASSIUM CHLORIDE, SODIUM LACTATE AND CALCIUM CHLORIDE: 600; 310; 30; 20 INJECTION, SOLUTION INTRAVENOUS at 14:48

## 2020-10-09 RX ADMIN — MIDAZOLAM 1 MG: 1 INJECTION INTRAMUSCULAR; INTRAVENOUS at 07:14

## 2020-10-09 RX ADMIN — FENTANYL CITRATE 50 MCG: 50 INJECTION, SOLUTION INTRAMUSCULAR; INTRAVENOUS at 08:47

## 2020-10-09 RX ADMIN — ONDANSETRON 4 MG: 2 INJECTION INTRAMUSCULAR; INTRAVENOUS at 11:17

## 2020-10-09 RX ADMIN — ACETAMINOPHEN 975 MG: 325 TABLET, FILM COATED ORAL at 16:32

## 2020-10-09 RX ADMIN — Medication 5 MG: at 08:08

## 2020-10-09 RX ADMIN — ROCURONIUM BROMIDE 20 MG: 10 INJECTION INTRAVENOUS at 09:42

## 2020-10-09 RX ADMIN — FENTANYL CITRATE 50 MCG: 50 INJECTION, SOLUTION INTRAMUSCULAR; INTRAVENOUS at 12:42

## 2020-10-09 RX ADMIN — Medication 5 MG: at 08:01

## 2020-10-09 RX ADMIN — FENTANYL CITRATE 50 MCG: 50 INJECTION, SOLUTION INTRAMUSCULAR; INTRAVENOUS at 09:43

## 2020-10-09 RX ADMIN — SODIUM CHLORIDE, POTASSIUM CHLORIDE, SODIUM LACTATE AND CALCIUM CHLORIDE: 600; 310; 30; 20 INJECTION, SOLUTION INTRAVENOUS at 13:08

## 2020-10-09 RX ADMIN — ACETAMINOPHEN 975 MG: 325 TABLET, FILM COATED ORAL at 06:54

## 2020-10-09 RX ADMIN — CEFAZOLIN 1 G: 1 INJECTION, POWDER, FOR SOLUTION INTRAMUSCULAR; INTRAVENOUS at 10:00

## 2020-10-09 RX ADMIN — PHENYLEPHRINE HYDROCHLORIDE 100 MCG: 10 INJECTION INTRAVENOUS at 08:12

## 2020-10-09 RX ADMIN — Medication 20 MG: at 09:05

## 2020-10-09 RX ADMIN — CEFAZOLIN 2 G: 10 INJECTION, POWDER, FOR SOLUTION INTRAVENOUS at 08:05

## 2020-10-09 RX ADMIN — DEXMEDETOMIDINE HYDROCHLORIDE 12 MCG: 100 INJECTION, SOLUTION INTRAVENOUS at 09:01

## 2020-10-09 RX ADMIN — OXYCODONE HYDROCHLORIDE 5 MG: 5 TABLET ORAL at 16:33

## 2020-10-09 RX ADMIN — FENTANYL CITRATE 50 MCG: 50 INJECTION, SOLUTION INTRAMUSCULAR; INTRAVENOUS at 13:07

## 2020-10-09 RX ADMIN — PROPOFOL 150 MG: 10 INJECTION, EMULSION INTRAVENOUS at 08:01

## 2020-10-09 RX ADMIN — BUPIVACAINE HYDROCHLORIDE 10 ML: 2.5 INJECTION, SOLUTION EPIDURAL; INFILTRATION; INTRACAUDAL; PERINEURAL at 08:30

## 2020-10-09 RX ADMIN — FENTANYL CITRATE 50 MCG: 50 INJECTION, SOLUTION INTRAMUSCULAR; INTRAVENOUS at 07:26

## 2020-10-09 RX ADMIN — FENTANYL CITRATE 50 MCG: 50 INJECTION, SOLUTION INTRAMUSCULAR; INTRAVENOUS at 09:39

## 2020-10-09 RX ADMIN — HYDROMORPHONE HYDROCHLORIDE 0.5 MG: 1 INJECTION, SOLUTION INTRAMUSCULAR; INTRAVENOUS; SUBCUTANEOUS at 16:41

## 2020-10-09 RX ADMIN — ROCURONIUM BROMIDE 20 MG: 10 INJECTION INTRAVENOUS at 08:45

## 2020-10-09 RX ADMIN — ROCURONIUM BROMIDE 50 MG: 10 INJECTION INTRAVENOUS at 08:02

## 2020-10-09 RX ADMIN — PHENYLEPHRINE HYDROCHLORIDE 100 MCG: 10 INJECTION INTRAVENOUS at 08:08

## 2020-10-09 RX ADMIN — PHENYLEPHRINE HYDROCHLORIDE 100 MCG: 10 INJECTION INTRAVENOUS at 08:03

## 2020-10-09 RX ADMIN — DEXMEDETOMIDINE HYDROCHLORIDE 12 MCG: 100 INJECTION, SOLUTION INTRAVENOUS at 08:50

## 2020-10-09 RX ADMIN — Medication 5 MG: at 11:57

## 2020-10-09 RX ADMIN — SUGAMMADEX 150 MG: 100 INJECTION, SOLUTION INTRAVENOUS at 11:25

## 2020-10-09 RX ADMIN — FENTANYL CITRATE 50 MCG: 50 INJECTION, SOLUTION INTRAMUSCULAR; INTRAVENOUS at 07:14

## 2020-10-09 RX ADMIN — SODIUM CHLORIDE, POTASSIUM CHLORIDE, SODIUM LACTATE AND CALCIUM CHLORIDE: 600; 310; 30; 20 INJECTION, SOLUTION INTRAVENOUS at 07:51

## 2020-10-09 RX ADMIN — FENTANYL CITRATE 150 MCG: 50 INJECTION, SOLUTION INTRAMUSCULAR; INTRAVENOUS at 07:59

## 2020-10-09 RX ADMIN — DEXAMETHASONE SODIUM PHOSPHATE 8 MG: 4 INJECTION, SOLUTION INTRA-ARTICULAR; INTRALESIONAL; INTRAMUSCULAR; INTRAVENOUS; SOFT TISSUE at 08:24

## 2020-10-09 RX ADMIN — LIDOCAINE HYDROCHLORIDE 30 ML: 10 INJECTION, SOLUTION EPIDURAL; INFILTRATION; INTRACAUDAL; PERINEURAL at 19:05

## 2020-10-09 RX ADMIN — SODIUM CHLORIDE, POTASSIUM CHLORIDE, SODIUM LACTATE AND CALCIUM CHLORIDE: 600; 310; 30; 20 INJECTION, SOLUTION INTRAVENOUS at 08:15

## 2020-10-09 RX ADMIN — MIDAZOLAM 1 MG: 1 INJECTION INTRAMUSCULAR; INTRAVENOUS at 07:26

## 2020-10-09 RX ADMIN — ROCURONIUM BROMIDE 20 MG: 10 INJECTION INTRAVENOUS at 10:15

## 2020-10-09 RX ADMIN — FENTANYL CITRATE 50 MCG: 50 INJECTION, SOLUTION INTRAMUSCULAR; INTRAVENOUS at 14:48

## 2020-10-09 RX ADMIN — LIDOCAINE HYDROCHLORIDE 100 MG: 20 INJECTION, SOLUTION INFILTRATION; PERINEURAL at 07:59

## 2020-10-09 RX ADMIN — Medication 5 MG: at 08:12

## 2020-10-09 RX ADMIN — DEXMEDETOMIDINE HYDROCHLORIDE 12 MCG: 100 INJECTION, SOLUTION INTRAVENOUS at 09:47

## 2020-10-09 RX ADMIN — DEXMEDETOMIDINE HYDROCHLORIDE 4 MCG: 100 INJECTION, SOLUTION INTRAVENOUS at 10:15

## 2020-10-09 RX ADMIN — ACETAMINOPHEN 975 MG: 325 TABLET, FILM COATED ORAL at 22:26

## 2020-10-09 RX ADMIN — Medication 20 MG: at 09:17

## 2020-10-09 RX ADMIN — FENTANYL CITRATE 50 MCG: 50 INJECTION, SOLUTION INTRAMUSCULAR; INTRAVENOUS at 14:03

## 2020-10-09 RX ADMIN — IBUPROFEN 600 MG: 600 TABLET ORAL at 22:25

## 2020-10-09 RX ADMIN — ONDANSETRON 4 MG: 2 INJECTION INTRAMUSCULAR; INTRAVENOUS at 15:05

## 2020-10-09 RX ADMIN — ROCURONIUM BROMIDE 20 MG: 10 INJECTION INTRAVENOUS at 09:10

## 2020-10-09 RX ADMIN — PROCHLORPERAZINE EDISYLATE 5 MG: 5 INJECTION INTRAMUSCULAR; INTRAVENOUS at 16:20

## 2020-10-09 ASSESSMENT — ACTIVITIES OF DAILY LIVING (ADL): ADLS_ACUITY_SCORE: 14

## 2020-10-09 ASSESSMENT — MIFFLIN-ST. JEOR: SCORE: 1753.75

## 2020-10-09 NOTE — OR NURSING
Block performed without complications.  Vital signs stable.  Pt tolerated well.  Patient will continue to be monitored and assessed.

## 2020-10-09 NOTE — OR NURSING
Dr. Chacon at bedside for increased pain assessment, will give additional nerve block bolus. .25 bupivicaine, 3 mls in each catheter. Will continue to monitor.

## 2020-10-09 NOTE — PROGRESS NOTES
"  Thoracic Surgery Progress Note  Post Op Check    10/09/2020    Julio Muñiz is a 19 year old male with h/o severe pectus excavatum now POD#0 s/p modified Ravitch repair with retrosternal bar placement x 2 and sternal plating with a KLS Sebastien plating system    Pt reports feeling woozy and tired. Denies nausea. Denies SOB, chest pain, or dizziness. No BM. Not yet voiding.    /58   Pulse 64   Temp 98.4  F (36.9  C) (Oral)   Resp 13   Ht 1.88 m (6' 2\")   Wt 67.9 kg (149 lb 11.1 oz)   SpO2 94%   BMI 19.22 kg/m      Gen: A&O x3, NAD, lethargic  Chest: breathing non-labored on RA  Abdomen: soft, non-tender, non-distended  Wound vac in place, some serosang drainage with sub-cutaneous air around wound vac site  Extremities: warm and well perfused    A/P: No acute post-op issues. Continue to manage pain and monitor drain output.    Please call with any questions.    Octavio Mayer MD GEOVANNA  PGY-1 Surgery  Pager [phone]        "

## 2020-10-09 NOTE — BRIEF OP NOTE
[facility]     Brief Operative Note    Pre-operative diagnosis: Pectus excavatum [Q67.6]    Post-operative diagnosis Same as pre-operative diagnosis    Procedure: Procedure(s):  Modified Ravitch Repair with Sternal Plating    Surgeon: Surgeon(s) and Role:     * Maya Anaya MD - Primary     * Erik Morrell MD - Fellow    Anesthesia: Combined General with Block     Estimated blood loss: Less than 50 ml    Drains:  19 Fr Ren Drain to Bulb Suction (Under Pectoralis Major)    Specimens: None    Complications: None    Implants:   Implant Name Type Inv. Item Serial No.  Lot No. LRB No. Used Action   KLS Bar      N/A 1 Implanted   KLS Bar      N/A 1 Implanted   KLS Plate      N/A 1 Implanted   KLS Sternal Screw      N/A 7 Implanted     Erik Morrell MD  Cardiothoracic Surgery Fellow  [phone]

## 2020-10-09 NOTE — ANESTHESIA PROCEDURE NOTES
Peripheral Nerve Block Procedure Note      Staff -   Anesthesiologist:  Guru Low MD  Resident/Fellow: Ren Chacon DO  Performed By: resident  Procedure performed by resident/CRNA in presence of a teaching physician.    Location: Pre-op  Procedure Start/Stop TImes:      10/9/2020 7:10 AM     10/9/2020 7:30 AM    patient identified, IV checked, site marked, risks and benefits discussed, informed consent, monitors and equipment checked, pre-op evaluation, at physician/surgeon's request and post-op pain management      Correct Patient: Yes      Correct Position: Yes      Correct Site: Yes      Correct Procedure: Yes      Correct Laterality:  Yes    Site Marked:  Yes  Procedure details:     Procedure:  Paravertebral    ASA:  2    Diagnosis:  Post op pain    Laterality:  Bilateral    Position:  Prone    Sterile Prep: chloraprep, patient draped, mask and sterile gloves      Local skin infiltration:  2% lidocaine    amount (mL):  4    Insertion Site:  T4-5    Needle:  Touhy needle    Needle gauge:  17    Needle length (inches):  3.1    Catheter gauge:  19    Catheter threaded easily: Yes      Threaded to cm at skin:  8    Ultrasound: Yes      Ultrasound used to identify targeted nerve, plexus, or vascular structure and placed a needle adjacent to it      Permanent Image entered into patiient's record      Paresthesias:  No    Bleeding at site: No      Bolus via:  Catheter    Infusion Method:  Continuous Infusion    Secured:  Dermabond and Tegaderm    Complications:  None

## 2020-10-09 NOTE — OR NURSING
"MD Alvarez paged at [phone]: \"3C bed 32 SARAI Muñiz:  Could you please sign consent so patient can be brought back to the OR? Thank you! Ginger MARCUS RN [phone] or [phone]  "

## 2020-10-09 NOTE — OP NOTE
Procedure Date: 10/09/2020      PREOPERATIVE DIAGNOSIS:  Severe pectus excavatum.      POSTOPERATIVE DIAGNOSIS:  Severe pectus excavatum.      PROCEDURES PERFORMED:   1.  Modified Ravitch repair with retrosternal bar placement x 2 and sternal plating with KLS Sebastien plating system.      ANESTHESIA:  General endotracheal anesthesia.      SURGEON:  Maya Ospina MD      ASSISTANT:  Erik Morrell, Cardiothoracic Surgery fellow.      COMPLICATIONS:  None.      ESTIMATED BLOOD LOSS:  20 mL      DRAINS AND TUBES:  A 19-Georgian Ren drain was placed below the pectoralis major muscle.      IMPLANTS:   1.  T-shaped sternal plating (KLS Sebastien system) with 3 screws on the upper border of the sternotomy and 3 screws below the sternotomy.   2.  A #22 retrosternal strut.   3.  A #20 retrosternal strut.      OPERATIVE FINDINGS:  The patient had a severe pectus deformity with a Corrine index of 15.  Given his large chest and severe deformity, he required 2 retrosternal bars.  We placed a #22 bar at the fifth intercostal space and a #20 retrosternal bar at the sixth intercostal space.      DESCRIPTION OF PROCEDURE IN DETAIL:  The patient was taken to the operating room and placed in the supine position.  General anesthesia was induced.  We placed a single lumen endotracheal tube.  The chest was prepped and draped in a normal sterile fashion.  We used iodine-impregnated plastic adhesive dressing.  A formal timeout was carried out confirming the name of the patient and correct procedure.  He had SCDs in place and functioning prior to induction of anesthesia.  He received antibiotics within 30 minutes of incision.      After the timeout was completed, myocutaneous flaps were raised superiorly and inferiorly.  We then performed an excision of the xiphoid process and released the subxiphoid fascia.  Next, we removed the costochondral junctions, preserving the perichondrium of ribs #3, 4, 5 and 6 bilaterally.  Hemostasis was then  confirmed.  We then performed a wedge transverse osteotomy in the second intercostal space.  This allowed for the distal portion of the sternum to displace anteriorly.  We then bluntly with the surgeon's finger created a retrosternal plane.  A #22 metal strut was brought to the field and tailored to the patient's anatomy.  Using a chest tube, the retrosternal bar was slid into place behind the sternum.  Care was taken to avoid injury to the mammary arteries.  We then placed a #20 bar below this intercostal space.  This appropriately displaced the sternum anteriorly to a more normal anatomic position.  Finally, a KLS Sebastien plating system was brought to the field.  We used a T-shaped plate that was laid down on top of the transverse osteotomy.  Three screws were placed above the osteotomy and 3 screws below.  Hemostasis was confirmed.  The patient tolerated the procedure well.  We then placed a 19-Upper sorbian Ren drain brought through a separate stab incision.  We then sutured the myocutaneous flaps to the chest wall and the wound was closed in multiple layers using 2-0 Vicryl.  We placed an incisional wound VAC and the skin was closed with 3 interrupted 2-0 nylons in the midline and the rest of the skin incision was closed with 4-0 Vicryl.  An incisional wound VAC was connected to suction.  The patient tolerated the procedure well.      All instrument and sponge counts were correct at the end of the case.  The patient was then extubated and transferred to PACU for recovery.         CHRIS ISRAEL MD             D: 10/09/2020   T: 10/09/2020   MT: DAVID      Name:     IRVIN LOCKETT   MRN:      [id number]        Account:        [id number]   :      2000           Procedure Date: 10/09/2020      Document: [id number]

## 2020-10-09 NOTE — ANESTHESIA POSTPROCEDURE EVALUATION
Anesthesia POST Procedure Evaluation    Patient: Julio Muñiz   MRN:     [id number] Gender:   male   Age:    19 year old :      2000        Preoperative Diagnosis: Pectus excavatum [Q67.6]   Procedure(s):  Modified Ravitch Repair with sternal plating   Postop Comments: No value filed.     Anesthesia Type: General       Disposition: Admission   Postop Pain Control: Uneventful            Sign Out: Well controlled pain   PONV: No   Neuro/Psych: Uneventful            Sign Out: Acceptable/Baseline neuro status   Airway/Respiratory: Uneventful            Sign Out: Acceptable/Baseline resp. status   CV/Hemodynamics: Uneventful            Sign Out: Acceptable CV status   Other NRE: NONE   DID A NON-ROUTINE EVENT OCCUR? No         Last Anesthesia Record Vitals:  CRNA VITALS  10/9/2020 1133 - 10/9/2020 1233      10/9/2020             Pulse:  81    SpO2:  99 %    Resp Rate (observed):  (!) 2          Last PACU Vitals:  Vitals Value Taken Time   /60 10/09/20 1440   Temp 36.9  C (98.4  F) 10/09/20 1300   Pulse 77 10/09/20 1450   Resp 12 10/09/20 1430   SpO2 91 % 10/09/20 1450   Temp src     NIBP     Pulse     SpO2     Resp     Temp     Ht Rate     Temp 2     Vitals shown include unvalidated device data.      Electronically Signed By: Chencho Vigil MD, 2020, 2:52 PM

## 2020-10-09 NOTE — ANESTHESIA CARE TRANSFER NOTE
Patient: Julio Muñiz    Procedure(s):  Modified Ravitch Repair with sternal plating    Diagnosis: Pectus excavatum [Q67.6]  Diagnosis Additional Information: No value filed.    Anesthesia Type:   General     Note:  Airway :ETT  Patient transferred to:PACU  Comments: Patient brought to PACU via ETT with MDA. ETT removed upon arrival after patient was connected to vent/monitors. Placed on oxy plus mask 15L. Awakens to touch and voice. Report to RN. Vital signs stable. Handoff Report: Identifed the Patient, Identified the Reponsible Provider, Reviewed the pertinent medical history, Discussed the surgical course, Reviewed Intra-OP anesthesia mangement and issues during anesthesia, Set expectations for post-procedure period and Allowed opportunity for questions and acknowledgement of understanding      Vitals: (Last set prior to Anesthesia Care Transfer)    CRNA VITALS  10/9/2020 1133 - 10/9/2020 1220      10/9/2020             Pulse:  81    SpO2:  95 %    Resp Rate (observed): 16                Electronically Signed By: ROGER Castaneda CRNA  October 9, 2020  12:20 PM

## 2020-10-09 NOTE — PROGRESS NOTES
REGIONAL ANESTHESIA PAIN SERVICE (RAPS) EVALUATION:  - Time: 1345.  Called to evaluate patient for bolus   - Evaluation: Patient reports pain intensity with current therapy 7/10 at rest General: NAD, sleepy, still waking up from anesthesia, uncomfortable wit slight movement.   Catheter system integrity: intact   Skin: dressing clean, dry and intact  Motor intact  Current vitals: /77    - Assessment/Plan    PAIN: moderately controlled     INTERVENTION: bolus  Bolus administered    MEDICATION: PF bupivacaine 0.25%, total bolus 10 mL, 5 mL via each catheter    PROCEDURE: Clinician bolus via paravertebral catheters; administered without complication with negative aspirate before and between each mL.    No symptoms of local anesthetic systemic toxicity (LAST). Remained with and assessed patient for 10 min post-injection. BP, P and MAP stable    POST-PROCEDURE: Bedside RN aware of need to continue BP, P and MAP monitoring Q 10 min for an additional 30 min. Contact RAPS if any of the following: patient experiencing any untoward effects, SBP< 90, P < 50 or > 120, MAP < 60     - Follow up @ 1400: Patient is subjectively more comfortable.   - patient can be evaluated to receive local anesthetic bolus Q 12 hr PRN pain not controlled with continuous infusion.  Bedside nurse must page RAPS to request bolus    Ren Chacon, DO  Anesthesiology, PGY-3, CA-2    RAPS Contact Info (24 hour job code pager is the last 4 digits) For in-house use only:  [facility] phone: [place] [phone], [place] [employer] [phone], Peds [phone], then enter call-back number.    Text: Use [employer] on the Intranet <Paging/Directory> tab and enter Jobcode ID.   If no call back at any time, contact the hospital  and ask for RAPS attending or backup

## 2020-10-09 NOTE — ANESTHESIA PREPROCEDURE EVALUATION
"Anesthesia Pre-Procedure Evaluation    Patient: Julio Muñiz   MRN:     [id number] Gender:   male   Age:    19 year old :      2000        Preoperative Diagnosis: Pectus excavatum [Q67.6]   Procedure(s):  Modified Ravitch Repair with sternal plating     LABS:  CBC:   Lab Results   Component Value Date    WBC 8.5 2020    HGB 17.5 2020    HCT 50.9 2020     2020     BMP:   Lab Results   Component Value Date    POTASSIUM 4.4 2020    CR 1.20 2020    GLC 94 2020     COAGS:   Lab Results   Component Value Date    INR 1.0 2020     POC:   Lab Results   Component Value Date    BGM 85 10/09/2020     OTHER: No results found for: PH, LACT, A1C, WANDA, PHOS, MAG, ALBUMIN, PROTTOTAL, ALT, AST, GGT, ALKPHOS, BILITOTAL, BILIDIRECT, LIPASE, AMYLASE, HAM, TSH, T4, T3, CRP, SED     Preop Vitals    BP Readings from Last 3 Encounters:   10/09/20 107/62   20 142/81   16 119/73    Pulse Readings from Last 3 Encounters:   10/09/20 81   20 72   16 62      Resp Readings from Last 3 Encounters:   10/09/20 12   20 17   16 18    SpO2 Readings from Last 3 Encounters:   10/09/20 93%   20 95%   20 99%      Temp Readings from Last 1 Encounters:   10/09/20 36.9  C (98.4  F) (Oral)    Ht Readings from Last 1 Encounters:   10/09/20 1.88 m (6' 2\")      Wt Readings from Last 1 Encounters:   10/09/20 67.9 kg (149 lb 11.1 oz)    Estimated body mass index is 19.22 kg/m  as calculated from the following:    Height as of this encounter: 1.88 m (6' 2\").    Weight as of this encounter: 67.9 kg (149 lb 11.1 oz).     LDA:  Peripheral IV 10/09/20 Right Hand (Active)   Site Assessment WDL 10/09/20 1400   Line Status Saline locked 10/09/20 1230   Phlebitis Scale 0-->no symptoms 10/09/20 1230   Infiltration Scale 0 10/09/20 0656   Infiltration Site Treatment Method  None 10/09/20 0656   Number of days: 0       Peripheral IV 10/09/20 Left Lower forearm " (Active)   Site Assessment [facility] 10/09/20 1400   Line Status Infusing 10/09/20 1400   Number of days: 0       Closed/Suction Drain 1 Anterior;Right Chest Bulb 19 Khmer (Active)   Site Description [facility] 10/09/20 1400   Dressing Status Drainage - Minimal 10/09/20 1200   Status To bulb suction 10/09/20 1400   Output (ml) 30 ml 10/09/20 1300   Number of days: 0       Negative Pressure Wound Therapy Chest Anterior;Midline (Active)   Wound Type Other (Comment) 10/09/20 1400   Dressing Status Moist drainage 10/09/20 1200   Number of days: 0        History reviewed. No pertinent past medical history.   History reviewed. No pertinent surgical history.   No Known Allergies     Anesthesia Evaluation     .             ROS/MED HX    ENT/Pulmonary:       Neurologic:       Cardiovascular:         METS/Exercise Tolerance:     Hematologic:         Musculoskeletal:   (+)  other musculoskeletal- pectus excavatum      GI/Hepatic:         Renal/Genitourinary:         Endo:         Psychiatric:         Infectious Disease:         Malignancy:         Other:                         PHYSICAL EXAM:   Mental Status/Neuro: A/A/O   Airway: Facies: Feasible   Respiratory:    CV:    Comments:                      Assessment:   ASA SCORE: 2    H&P: History and physical reviewed and following examination; no interval change.         Plan:   Anes. Type:  General; Peripheral Nerve Block     Block Details: Catheter; PVB   Pre-Medication: Midazolam   Induction:  IV (Standard)   Airway: ETT; Oral   Access/Monitoring: PIV; 2nd PIV   Maintenance: Balanced     Postop Plan:   Postop Pain: Opioids; Regional  Postop Sedation/Airway: Not planned  Disposition: Inpatient/Admit     PONV Management:   Adult Risk Factors:, Postop Opioids   Prevention: Ondansetron, Dexamethasone     CONSENT: Direct conversation   Plan and risks discussed with: Patient; Mother   Blood Products: Consented (ALL Blood Products)                   Chencho Vigil MD

## 2020-10-10 ENCOUNTER — APPOINTMENT (OUTPATIENT)
Dept: GENERAL RADIOLOGY | Facility: CLINIC | Age: 20
DRG: 516 | End: 2020-10-10
Attending: THORACIC SURGERY (CARDIOTHORACIC VASCULAR SURGERY)
Payer: COMMERCIAL

## 2020-10-10 LAB
ANION GAP SERPL CALCULATED.3IONS-SCNC: 6 MMOL/L (ref 3–14)
BUN SERPL-MCNC: 14 MG/DL (ref 7–30)
CALCIUM SERPL-MCNC: 8.6 MG/DL (ref 8.5–10.1)
CHLORIDE SERPL-SCNC: 107 MMOL/L (ref 94–109)
CO2 SERPL-SCNC: 27 MMOL/L (ref 20–32)
CREAT SERPL-MCNC: 1.03 MG/DL (ref 0.66–1.25)
ERYTHROCYTE [DISTWIDTH] IN BLOOD BY AUTOMATED COUNT: 12.4 % (ref 10–15)
GFR SERPL CREATININE-BSD FRML MDRD: >90 ML/MIN/{1.73_M2}
GLUCOSE SERPL-MCNC: 102 MG/DL (ref 70–99)
HCT VFR BLD AUTO: 40.7 % (ref 40–53)
HGB BLD-MCNC: 13.7 G/DL (ref 13.3–17.7)
MAGNESIUM SERPL-MCNC: 1.9 MG/DL (ref 1.6–2.3)
MCH RBC QN AUTO: 31.9 PG (ref 26.5–33)
MCHC RBC AUTO-ENTMCNC: 33.7 G/DL (ref 31.5–36.5)
MCV RBC AUTO: 95 FL (ref 78–100)
PLATELET # BLD AUTO: 189 10E9/L (ref 150–450)
POTASSIUM SERPL-SCNC: 3.9 MMOL/L (ref 3.4–5.3)
RADIOLOGIST FLAGS: ABNORMAL
RBC # BLD AUTO: 4.29 10E12/L (ref 4.4–5.9)
SODIUM SERPL-SCNC: 140 MMOL/L (ref 133–144)
WBC # BLD AUTO: 11.8 10E9/L (ref 4–11)

## 2020-10-10 PROCEDURE — 83735 ASSAY OF MAGNESIUM: CPT

## 2020-10-10 PROCEDURE — 250N000011 HC RX IP 250 OP 636

## 2020-10-10 PROCEDURE — 999N000065 XR CHEST PORT 1 VW

## 2020-10-10 PROCEDURE — 71045 X-RAY EXAM CHEST 1 VIEW: CPT | Mod: 77

## 2020-10-10 PROCEDURE — 85027 COMPLETE CBC AUTOMATED: CPT

## 2020-10-10 PROCEDURE — 250N000013 HC RX MED GY IP 250 OP 250 PS 637: Performed by: STUDENT IN AN ORGANIZED HEALTH CARE EDUCATION/TRAINING PROGRAM

## 2020-10-10 PROCEDURE — 36415 COLL VENOUS BLD VENIPUNCTURE: CPT

## 2020-10-10 PROCEDURE — 71045 X-RAY EXAM CHEST 1 VIEW: CPT | Mod: 26 | Performed by: RADIOLOGY

## 2020-10-10 PROCEDURE — 71045 X-RAY EXAM CHEST 1 VIEW: CPT

## 2020-10-10 PROCEDURE — 80048 BASIC METABOLIC PNL TOTAL CA: CPT

## 2020-10-10 PROCEDURE — 120N000002 HC R&B MED SURG/OB UMMC

## 2020-10-10 PROCEDURE — 96372 THER/PROPH/DIAG INJ SC/IM: CPT

## 2020-10-10 PROCEDURE — 250N000013 HC RX MED GY IP 250 OP 250 PS 637

## 2020-10-10 RX ORDER — AMOXICILLIN 250 MG
1 CAPSULE ORAL 2 TIMES DAILY
Status: DISCONTINUED | OUTPATIENT
Start: 2020-10-10 | End: 2020-10-12 | Stop reason: HOSPADM

## 2020-10-10 RX ORDER — POLYETHYLENE GLYCOL 3350 17 G/17G
17 POWDER, FOR SOLUTION ORAL DAILY
Status: DISCONTINUED | OUTPATIENT
Start: 2020-10-10 | End: 2020-10-12 | Stop reason: HOSPADM

## 2020-10-10 RX ORDER — ONDANSETRON HYDROCHLORIDE 4 MG/5ML
4 SOLUTION ORAL EVERY 8 HOURS PRN
Status: DISCONTINUED | OUTPATIENT
Start: 2020-10-10 | End: 2020-10-12 | Stop reason: HOSPADM

## 2020-10-10 RX ADMIN — HYDROMORPHONE HYDROCHLORIDE 0.5 MG: 1 INJECTION, SOLUTION INTRAMUSCULAR; INTRAVENOUS; SUBCUTANEOUS at 03:50

## 2020-10-10 RX ADMIN — HEPARIN SODIUM 5000 UNITS: 5000 INJECTION, SOLUTION INTRAVENOUS; SUBCUTANEOUS at 16:07

## 2020-10-10 RX ADMIN — IBUPROFEN 600 MG: 600 TABLET ORAL at 03:11

## 2020-10-10 RX ADMIN — OXYCODONE HYDROCHLORIDE 5 MG: 5 TABLET ORAL at 22:56

## 2020-10-10 RX ADMIN — ACETAMINOPHEN 975 MG: 325 TABLET, FILM COATED ORAL at 09:25

## 2020-10-10 RX ADMIN — OXYCODONE HYDROCHLORIDE 5 MG: 5 TABLET ORAL at 14:00

## 2020-10-10 RX ADMIN — HEPARIN SODIUM 5000 UNITS: 5000 INJECTION, SOLUTION INTRAVENOUS; SUBCUTANEOUS at 08:27

## 2020-10-10 RX ADMIN — HYDROMORPHONE HYDROCHLORIDE 0.5 MG: 1 INJECTION, SOLUTION INTRAMUSCULAR; INTRAVENOUS; SUBCUTANEOUS at 19:46

## 2020-10-10 RX ADMIN — IBUPROFEN 600 MG: 600 TABLET ORAL at 22:24

## 2020-10-10 RX ADMIN — IBUPROFEN 600 MG: 600 TABLET ORAL at 09:25

## 2020-10-10 RX ADMIN — OXYCODONE HYDROCHLORIDE 5 MG: 5 TABLET ORAL at 17:58

## 2020-10-10 RX ADMIN — ACETAMINOPHEN 975 MG: 325 TABLET, FILM COATED ORAL at 03:11

## 2020-10-10 RX ADMIN — OXYCODONE HYDROCHLORIDE 5 MG: 5 TABLET ORAL at 02:24

## 2020-10-10 RX ADMIN — DOCUSATE SODIUM 50 MG AND SENNOSIDES 8.6 MG 1 TABLET: 8.6; 5 TABLET, FILM COATED ORAL at 08:44

## 2020-10-10 RX ADMIN — HYDROMORPHONE HYDROCHLORIDE 0.5 MG: 1 INJECTION, SOLUTION INTRAMUSCULAR; INTRAVENOUS; SUBCUTANEOUS at 16:06

## 2020-10-10 RX ADMIN — OXYCODONE HYDROCHLORIDE 5 MG: 5 TABLET ORAL at 08:39

## 2020-10-10 RX ADMIN — HYDROMORPHONE HYDROCHLORIDE 0.5 MG: 1 INJECTION, SOLUTION INTRAMUSCULAR; INTRAVENOUS; SUBCUTANEOUS at 09:25

## 2020-10-10 RX ADMIN — IBUPROFEN 600 MG: 600 TABLET ORAL at 16:07

## 2020-10-10 RX ADMIN — POLYETHYLENE GLYCOL 3350 17 G: 17 POWDER, FOR SOLUTION ORAL at 08:44

## 2020-10-10 RX ADMIN — ACETAMINOPHEN 975 MG: 325 TABLET, FILM COATED ORAL at 22:24

## 2020-10-10 RX ADMIN — DOCUSATE SODIUM 50 MG AND SENNOSIDES 8.6 MG 1 TABLET: 8.6; 5 TABLET, FILM COATED ORAL at 19:45

## 2020-10-10 RX ADMIN — ACETAMINOPHEN 975 MG: 325 TABLET, FILM COATED ORAL at 16:07

## 2020-10-10 ASSESSMENT — ACTIVITIES OF DAILY LIVING (ADL)
ADLS_ACUITY_SCORE: 13
ADLS_ACUITY_SCORE: 14
ADLS_ACUITY_SCORE: 13
ADLS_ACUITY_SCORE: 13

## 2020-10-10 NOTE — PLAN OF CARE
Vitals:    10/09/20 1830 10/09/20 1915 10/09/20 1945 10/09/20 2045   BP: 105/41 128/59 102/45 97/44   BP Location: Left arm Left arm Left arm Left arm   Pulse: 70 95 78 71   Resp: 12 14 14 14   Temp:       TempSrc:       SpO2: (!) 89% 90% 91% 92%   Weight:       Height:        19 years old male came from PACU after having pectus excavatum, transverse chest incision wound vac at 125, ON-Q pump at 14 ml,  alert and sleepy,   Taking clears, tolerating, right LISA not holding  suction, pt arrived like that,  Pig tail Chest tube placed at bed site, denies any nausea, better pain control,  Chest x-ray ordered, pt is resting, continue with plan of care.

## 2020-10-10 NOTE — PROCEDURES
CHEST TUBE PLACEMENT  Date: .October 9, 2020  Time: 7:25PM  Indication: R. Pneumothorax  MD Octavio Galo MD     A time-out was completed verifying correct patient, procedure, site, positioning, and special equipment if applicable. The patient was positioned appropriately for chest tube placement. The patient s right chest was prepped and draped in sterile fashion with Chloraprep. 1% Lidocaine was used to anesthetize the surrounding skin area. A 1.5 cm skin incision was made in the mid-axillary line at the inframammarycrease. A subcutaneous tunnel was created cephalad just adjacent to the superior rib. The pleural space was entered with a guide wire and dilator, with pigtail catheter inserted and a gush of air was observed. A  pigtail was repositioned appropriately. The chest tube was sutured securely to the skin and a sterile dressing applied. A pleurevac was attached to the chest tube, and put to continuous -20 suction and a chest x-ray obtained. Dr. Morrell and Dr. Mayer were present for the entire procedure.    Estimated Blood Loss: Minimal  The patient tolerated the procedure well and there were no complications.

## 2020-10-10 NOTE — PROGRESS NOTES
Telephone call note    I spoke with Dayna Muñiz, pt's mother, on the phone this evening. Provided her with the update about chest tube placement and pt's current status. She would like to be updated tomorrow as well.     Lona Bee MD (PGY-1)  Surgery

## 2020-10-10 NOTE — PROGRESS NOTES
REGIONAL ANESTHESIA PAIN SERVICE CONTINUOUS NERVE INFUSION NOTE  Julio Muñiz is a 19 year old male POD #1 s/p Modified Ravitch repair due to seere pectus excavatum with retrosternal bar placement x 2 and sternal plating and placement of bilateral T5-6 paravertebral (PV) catheter for pain control.    SUBJECTIVE:  Interval History:   No acute overnight events. Patient received a bolus at around 1330 yesterday. Stated that he felt relief with bolus. Patient reports moderate pain control with nerve block continuous infusion and current analgesics. Rates intensity 8/10 this morning, worse with deep breaths. Denies weakness, paresthesias, circumoral numbness, metallic taste or tinnitus.    Antithrombotic/Thrombolytic Therapy ordered:  subcutaneous heparin 5000 units Q8H     Analgesic Medications:   Medications related to Pain Management (From now, onward)    Start     Dose/Rate Route Frequency Ordered Stop    10/10/20 0800  polyethylene glycol (MIRALAX) Packet 17 g      17 g Oral DAILY 10/10/20 0626      10/10/20 0800  senna-docusate (SENOKOT-S/PERICOLACE) 8.6-50 MG per tablet 1 tablet      1 tablet Oral 2 TIMES DAILY 10/10/20 0626      10/09/20 1200  acetaminophen (TYLENOL) tablet 975 mg      975 mg Oral EVERY 6 HOURS 10/09/20 1134      10/09/20 1200  ibuprofen (ADVIL/MOTRIN) tablet 600 mg      600 mg Oral EVERY 6 HOURS 10/09/20 1134      10/09/20 1135  HYDROmorphone (PF) (DILAUDID) injection 0.5 mg      0.5 mg Intravenous EVERY 4 HOURS PRN 10/09/20 1135      10/09/20 1134  oxyCODONE (ROXICODONE) tablet 5 mg      5 mg Oral EVERY 4 HOURS PRN 10/09/20 1134      10/09/20 1133  lidocaine 1 % 0.1-1 mL      0.1-1 mL Other EVERY 1 HOUR PRN 10/09/20 1133      10/09/20 1133  lidocaine (LMX4) kit       Topical EVERY 1 HOUR PRN 10/09/20 1133      10/09/20 0830  ropivacaine 0.2% (NAROPIN) 750 mL in ON-Q C-Bloc select flow ([id number] holds 600-750 mL) dual cath disposable pump      14 mL/hr  Irrigation CONTINUOUS 10/09/20 0726              OBJECTIVE:  Lab results  Recent Labs   Lab Test 10/10/20  0656   WBC 11.8*   RBC 4.29*   HGB 13.7   HCT 40.7   MCV 95   MCH 31.9   MCHC 33.7   RDW 12.4          Lab Results   Component Value Date    INR 1.0 09/17/2020         Vitals:    Temp:  [35.7  C (96.2  F)-36.9  C (98.4  F)] 35.8  C (96.5  F)  Pulse:  [64-96] 88  Resp:  [10-23] 23  BP: ()/(33-76) 119/44  FiO2 (%):  [4 %] 4 %  SpO2:  [88 %-97 %] 90 %    Exam:   GEN: alert and mild distress  NEURO/MSK: Strength BLE 5/5   SKIN: bilateral paravertebral (PV) catheter site with dressing c/d/i, no tenderness, erythema, heme, edema    ASSESSMENT/PLAN:    Patient is receiving moderate relief with current analgesia multimodal therapy including bilateral T5-6 paravertebral (PV) catheter with infusion of ropivacaine 0.2%  at 14mL/hr, 7mL/hr each catheter.  Motor function and adequate sensory block. No evidence of adverse side effects related to local anesthetic.     - continue ropivacaine 0.2% infusion rate at 14mL/hr, 7mL/hr each catheter  - Bolus Administered @ 7am: 10 ml of 0.25% bupivacaine given, 5 ml to each catheter  - antithrombotic/thrombolytic therapy subcutaneous heparin ordered. - Please contact RAPS (#[phone]) prior to any medication changes  - will continue to follow and adjust as needed    - discussed plan with attending anesthesiologist    Jayleen Huang MD  Regional Anesthesia Pain Service  10/10/2020 8:53 AM    RAPS Contact Info (24 hour job code pager is the last 4 digits) For in-house use only:   [employer] phone: [place] [phone], [place] [phone], [facility] [phone], then enter call-back number.    Text: Use [employer] on the Intranet <Paging/Directory> tab and enter Jobcode ID.   If no call back at any time, contact the hospital  and ask for RAPS attending or backup

## 2020-10-10 NOTE — PROGRESS NOTES
"Thoracic Surgery Progress Note  10/10/2020    Subjective:  Right sided pneumothorax overnight. Had chest tube placed without resolution of ptx (right). Removed one of the connectors off the chest tube, which resolved the ptx on right side. Continues to have left sided ptx still. On minimal oxygen 2L. Denies sob. Continues to have pain on the chest tube site. Eating.     Objective:  /44 (BP Location: Left arm)   Pulse 88   Temp 96.5  F (35.8  C) (Oral)   Resp 23   Ht 1.88 m (6' 2\")   Wt 67.9 kg (149 lb 11.1 oz)   SpO2 90%   BMI 19.22 kg/m      I/O last 3 completed shifts:  In: 2180 [P.O.:780; I.V.:1400]  Out: 1277 [Urine:1157; Drains:100; Blood:20]    Alert and oriented  HDS. RRR  CTAB. Unlabored breathing on 2L. Chest tube site clean/dry. Chest tube to suction -10 with minimal/no air leak  Abd soft, non tender, non distended  Skin warm and well perfused     Labs: Reviewed.    Assessment/Plan:  19 year old male with severe pectus excavatum now POD 1 s/p modified ravitch repair complicated by right sided ptx now with chest tube placement on right side. Continues to have left ptx which is closely being monitored    - pain control with S tylenol and ibuprofen. PV block. PRN oxycodone and dilaudid  - HDS  - continue chest tube to suction. Repeat cxr at 1500 to evaluate for worsening of left ptx   - regular diet. Bowel regimen  - voiding spontaneously  - encourage ambulation.   - subcutaneous heparin TID    Chele Jose MD  General Surgery PGY-3  [phone]      "

## 2020-10-10 NOTE — PLAN OF CARE
"/41 (BP Location: Left arm)   Pulse 83   Temp 96.2  F (35.7  C) (Oral)   Resp 18   Ht 1.88 m (6' 2\")   Wt 67.9 kg (149 lb 11.1 oz)   SpO2 96%   BMI 19.22 kg/m      Time: 0586-3622  Neuros: A&Ox4, calm, able to make needs known.   Cardiac: /41, P 83.   Respiratory: LS diminished. Sat 94-96% on 3L NC.   Pain: c/o chest and back pain controlled with On-Q pump at 14 ml; scheduled tylenol and ibuprofen; PRN oxycodone and dilaudid with relief.   Nausea: Denies nausea/emesis.   Diet: Taking clears.   GI/: Voiding spontaneously. +BS, +flatus, no BM.   Skin/Incisions/drains: transverse chest incision wound vac intact, no output; pigtail chest tube to continuous -20 suction, no output. R LISA not holding suction, scant output.   Lines: R PIV infusing TKO. L PIV SL.   Labs: Reviewed.   Activity: Ambulated to bathroom with SBA.   Plan: Chest x-ray done. Continue POC.     "

## 2020-10-10 NOTE — PROGRESS NOTES
STAFF ADDENDUM:  I saw and evaluated Mr. Muñiz and agree with the resident s findings and plan of care   Needed right chest tube for PTX post op  Initially unresolved but has been draining and tidalling well after connections modified- rpt XR pending  Continue tube today      Beatriz Greenberg MD

## 2020-10-10 NOTE — PLAN OF CARE
Admitted/transferred from:   2 RN     skin assessment completed by Moses Melendez RN and Louisa Woodson.  Skin assessment finding: no Issues except chest incision and chest tube   Interventions/actions: none     Will continue to monitor.

## 2020-10-11 ENCOUNTER — APPOINTMENT (OUTPATIENT)
Dept: GENERAL RADIOLOGY | Facility: CLINIC | Age: 20
DRG: 516 | End: 2020-10-11
Attending: THORACIC SURGERY (CARDIOTHORACIC VASCULAR SURGERY)
Payer: COMMERCIAL

## 2020-10-11 ENCOUNTER — APPOINTMENT (OUTPATIENT)
Dept: GENERAL RADIOLOGY | Facility: CLINIC | Age: 20
DRG: 516 | End: 2020-10-11
Attending: STUDENT IN AN ORGANIZED HEALTH CARE EDUCATION/TRAINING PROGRAM
Payer: COMMERCIAL

## 2020-10-11 LAB
ANION GAP SERPL CALCULATED.3IONS-SCNC: 4 MMOL/L (ref 3–14)
BUN SERPL-MCNC: 10 MG/DL (ref 7–30)
CALCIUM SERPL-MCNC: 8.8 MG/DL (ref 8.5–10.1)
CHLORIDE SERPL-SCNC: 105 MMOL/L (ref 94–109)
CO2 SERPL-SCNC: 30 MMOL/L (ref 20–32)
CREAT SERPL-MCNC: 1.08 MG/DL (ref 0.66–1.25)
ERYTHROCYTE [DISTWIDTH] IN BLOOD BY AUTOMATED COUNT: 12.4 % (ref 10–15)
GFR SERPL CREATININE-BSD FRML MDRD: >90 ML/MIN/{1.73_M2}
GLUCOSE SERPL-MCNC: 101 MG/DL (ref 70–99)
HCT VFR BLD AUTO: 42.3 % (ref 40–53)
HGB BLD-MCNC: 13.7 G/DL (ref 13.3–17.7)
MCH RBC QN AUTO: 31.6 PG (ref 26.5–33)
MCHC RBC AUTO-ENTMCNC: 32.4 G/DL (ref 31.5–36.5)
MCV RBC AUTO: 98 FL (ref 78–100)
PLATELET # BLD AUTO: 170 10E9/L (ref 150–450)
POTASSIUM SERPL-SCNC: 3.9 MMOL/L (ref 3.4–5.3)
RADIOLOGIST FLAGS: ABNORMAL
RBC # BLD AUTO: 4.34 10E12/L (ref 4.4–5.9)
SODIUM SERPL-SCNC: 139 MMOL/L (ref 133–144)
WBC # BLD AUTO: 9.5 10E9/L (ref 4–11)

## 2020-10-11 PROCEDURE — 71045 X-RAY EXAM CHEST 1 VIEW: CPT | Mod: 26 | Performed by: RADIOLOGY

## 2020-10-11 PROCEDURE — 36415 COLL VENOUS BLD VENIPUNCTURE: CPT | Performed by: STUDENT IN AN ORGANIZED HEALTH CARE EDUCATION/TRAINING PROGRAM

## 2020-10-11 PROCEDURE — 71045 X-RAY EXAM CHEST 1 VIEW: CPT

## 2020-10-11 PROCEDURE — 250N000009 HC RX 250: Performed by: STUDENT IN AN ORGANIZED HEALTH CARE EDUCATION/TRAINING PROGRAM

## 2020-10-11 PROCEDURE — 250N000013 HC RX MED GY IP 250 OP 250 PS 637: Performed by: STUDENT IN AN ORGANIZED HEALTH CARE EDUCATION/TRAINING PROGRAM

## 2020-10-11 PROCEDURE — 250N000011 HC RX IP 250 OP 636: Performed by: STUDENT IN AN ORGANIZED HEALTH CARE EDUCATION/TRAINING PROGRAM

## 2020-10-11 PROCEDURE — 120N000002 HC R&B MED SURG/OB UMMC

## 2020-10-11 PROCEDURE — 271N000002 HC RX 271: Performed by: STUDENT IN AN ORGANIZED HEALTH CARE EDUCATION/TRAINING PROGRAM

## 2020-10-11 PROCEDURE — 85027 COMPLETE CBC AUTOMATED: CPT | Performed by: STUDENT IN AN ORGANIZED HEALTH CARE EDUCATION/TRAINING PROGRAM

## 2020-10-11 PROCEDURE — 80048 BASIC METABOLIC PNL TOTAL CA: CPT | Performed by: STUDENT IN AN ORGANIZED HEALTH CARE EDUCATION/TRAINING PROGRAM

## 2020-10-11 PROCEDURE — 250N000011 HC RX IP 250 OP 636

## 2020-10-11 PROCEDURE — 96372 THER/PROPH/DIAG INJ SC/IM: CPT

## 2020-10-11 PROCEDURE — 250N000013 HC RX MED GY IP 250 OP 250 PS 637

## 2020-10-11 PROCEDURE — 71045 X-RAY EXAM CHEST 1 VIEW: CPT | Mod: 77

## 2020-10-11 RX ADMIN — IBUPROFEN 600 MG: 600 TABLET ORAL at 21:54

## 2020-10-11 RX ADMIN — HEPARIN SODIUM 5000 UNITS: 5000 INJECTION, SOLUTION INTRAVENOUS; SUBCUTANEOUS at 16:17

## 2020-10-11 RX ADMIN — ACETAMINOPHEN 975 MG: 325 TABLET, FILM COATED ORAL at 16:17

## 2020-10-11 RX ADMIN — IBUPROFEN 600 MG: 600 TABLET ORAL at 10:43

## 2020-10-11 RX ADMIN — HYDROMORPHONE HYDROCHLORIDE 0.5 MG: 1 INJECTION, SOLUTION INTRAMUSCULAR; INTRAVENOUS; SUBCUTANEOUS at 15:21

## 2020-10-11 RX ADMIN — DOCUSATE SODIUM 50 MG AND SENNOSIDES 8.6 MG 1 TABLET: 8.6; 5 TABLET, FILM COATED ORAL at 20:00

## 2020-10-11 RX ADMIN — ACETAMINOPHEN 975 MG: 325 TABLET, FILM COATED ORAL at 04:43

## 2020-10-11 RX ADMIN — DOCUSATE SODIUM 50 MG AND SENNOSIDES 8.6 MG 1 TABLET: 8.6; 5 TABLET, FILM COATED ORAL at 08:43

## 2020-10-11 RX ADMIN — ACETAMINOPHEN 975 MG: 325 TABLET, FILM COATED ORAL at 21:55

## 2020-10-11 RX ADMIN — HYDROMORPHONE HYDROCHLORIDE 0.5 MG: 1 INJECTION, SOLUTION INTRAMUSCULAR; INTRAVENOUS; SUBCUTANEOUS at 06:47

## 2020-10-11 RX ADMIN — ONDANSETRON HYDROCHLORIDE 4 MG: 4 SOLUTION ORAL at 06:44

## 2020-10-11 RX ADMIN — POLYETHYLENE GLYCOL 3350 17 G: 17 POWDER, FOR SOLUTION ORAL at 08:44

## 2020-10-11 RX ADMIN — OXYCODONE HYDROCHLORIDE 5 MG: 5 TABLET ORAL at 05:51

## 2020-10-11 RX ADMIN — OXYCODONE HYDROCHLORIDE 5 MG: 5 TABLET ORAL at 21:54

## 2020-10-11 RX ADMIN — IBUPROFEN 600 MG: 600 TABLET ORAL at 04:43

## 2020-10-11 RX ADMIN — IBUPROFEN 600 MG: 600 TABLET ORAL at 16:17

## 2020-10-11 RX ADMIN — HYDROMORPHONE HYDROCHLORIDE 0.5 MG: 1 INJECTION, SOLUTION INTRAMUSCULAR; INTRAVENOUS; SUBCUTANEOUS at 00:27

## 2020-10-11 RX ADMIN — HEPARIN SODIUM 5000 UNITS: 5000 INJECTION, SOLUTION INTRAVENOUS; SUBCUTANEOUS at 08:43

## 2020-10-11 RX ADMIN — ACETAMINOPHEN 975 MG: 325 TABLET, FILM COATED ORAL at 10:43

## 2020-10-11 RX ADMIN — HEPARIN SODIUM 5000 UNITS: 5000 INJECTION, SOLUTION INTRAVENOUS; SUBCUTANEOUS at 00:27

## 2020-10-11 RX ADMIN — Medication: at 21:57

## 2020-10-11 ASSESSMENT — ACTIVITIES OF DAILY LIVING (ADL)
ADLS_ACUITY_SCORE: 15
ADLS_ACUITY_SCORE: 13
ADLS_ACUITY_SCORE: 15
WALKING_OR_CLIMBING_STAIRS_DIFFICULTY: NO
ADLS_ACUITY_SCORE: 15

## 2020-10-11 NOTE — PROGRESS NOTES
"REGIONAL ANESTHESIA PAIN SERVICE (RAPS) EVALUATION:  - Time: 1900.  Called to evaluate patient for bolus/pain management   - Evaluation: Patient reports pain intensity with current therapy 8/10 at rest and reports that bolus in the AM helped reduce intensity to 4/10  General: alert and mild distress  Catheter system integrity: intact  Skin: dressing clean, dry and intact.  Current vitals:  /58 (BP Location: Left arm)   Pulse 79   Temp 36.3  C (97.3  F)   Resp 20   Ht 1.88 m (6' 2\")   Wt 67.9 kg (149 lb 11.1 oz)   SpO2 92%   BMI 19.22 kg/m      - Assessment/Plan    PAIN: Adequately controlled     INTERVENTION:   Bolus administered    MEDICATION: PF bupivacaine 0.25%, total bolus 10 mL, 5 mL via each catheter    PROCEDURE: Clinician bolus via PV nerve block catheters; administered without complication with negative aspirate before and between each mL.    No symptoms of local anesthetic systemic toxicity (LAST). Remained with and assessed patient for 10 min post-injection. BP, P and MAP stable    POST-PROCEDURE: Bedside RN aware of need to continue BP, P and MAP monitoring Q 10 min for an additional 30 min. Contact RAPS (jobcode ID) if any of the following: patient experiencing any untoward effects, SBP< 90, P < 50 or > 120, MAP < 60     - patient can be evaluated to receive local anesthetic bolus Q 12 hr PRN pain not controlled with continuous infusion.  Bedside nurse must page RAPS to request bolus    Jayleen Huang MD  CA2 Anesthesiology Resident     RAPS Contact Info (24 hour job code pager is the last 4 digits) For in-house use only:  [employer] phone: [place] [phone], [place] [phone], [facility] [phone], then enter call-back number.    Text: Use [employer] on the Intranet <Paging/Directory> tab and enter Jobcode ID.   If no call back at any time, contact the hospital  and ask for RAPS attending or backup           "

## 2020-10-11 NOTE — PROGRESS NOTES
REGIONAL ANESTHESIA PAIN SERVICE CONTINUOUS NERVE INFUSION NOTE  Julio Muñiz is a 19 year old male POD #2 s/p Modified Ravitch repair due to seere pectus excavatum with retrosternal bar placement x 2 and sternal plating and placement of bilateral T5-6 paravertebral (PV) catheter for pain control.     SUBJECTIVE:  Interval History:   No events overnight. Doing well this morning. Patient reports after boluses, his pain significantly improves. Currently, 5/10 pain with sitting. He denies any numbness, tingling, or ringing in the ears. Most of his pain is located in the RU chest near the insertion of his chest tube. No other complaints.      Antithrombotic/Thrombolytic Therapy ordered:  subcutaneous heparin 5000 units Q8H      Analgesic Medications:               Medications related to Pain Management (From now, onward)     Start     Dose/Rate Route Frequency Ordered Stop     10/10/20 0800   polyethylene glycol (MIRALAX) Packet 17 g      17 g Oral DAILY 10/10/20 0626       10/10/20 0800   senna-docusate (SENOKOT-S/PERICOLACE) 8.6-50 MG per tablet 1 tablet      1 tablet Oral 2 TIMES DAILY 10/10/20 0626       10/09/20 1200   acetaminophen (TYLENOL) tablet 975 mg      975 mg Oral EVERY 6 HOURS 10/09/20 1134       10/09/20 1200   ibuprofen (ADVIL/MOTRIN) tablet 600 mg      600 mg Oral EVERY 6 HOURS 10/09/20 1134       10/09/20 1135   HYDROmorphone (PF) (DILAUDID) injection 0.5 mg      0.5 mg Intravenous EVERY 4 HOURS PRN 10/09/20 1135       10/09/20 1134   oxyCODONE (ROXICODONE) tablet 5 mg      5 mg Oral EVERY 4 HOURS PRN 10/09/20 1134       10/09/20 1133   lidocaine 1 % 0.1-1 mL      0.1-1 mL Other EVERY 1 HOUR PRN 10/09/20 1133       10/09/20 1133   lidocaine (LMX4) kit        Topical EVERY 1 HOUR PRN 10/09/20 1133       10/09/20 0830   ropivacaine 0.2% (NAROPIN) 750 mL in ON-Q C-Bloc select flow ([id number] holds 600-750 mL) dual cath disposable pump      14 mL/hr  Irrigation CONTINUOUS 10/09/20 9699                OBJECTIVE:  Lab results      Recent Labs   Lab Test 10/10/20  0656   WBC 11.8*   RBC 4.29*   HGB 13.7   HCT 40.7   MCV 95   MCH 31.9   MCHC 33.7   RDW 12.4                  Lab Results   Component Value Date     INR 1.0 09/17/2020            Vitals:    Temp:  [36.2  C (97.2  F)-36.7  C (98.1  F)] 36.2  C (97.2  F)  Pulse:  [64-89] 74  Resp:  [16-23] 16  BP: (119-130)/(58-64) 125/64  SpO2:  [90 %-97 %] 97 %     Exam:   GEN: Alert, NAD, sitting up in bed.   NEURO/MSK: Strength BLE 5/5   SKIN: bilateral paravertebral (PV) catheter site with dressing c/d/i, no tenderness, erythema, heme, edema     ASSESSMENT/PLAN:    Patient is receiving moderate relief with current analgesia multimodal therapy including bilateral T5-6 paravertebral (PV) catheter with infusion of ropivacaine 0.2%  at 14mL/hr, 7mL/hr each catheter.  Motor function and adequate sensory block. No evidence of adverse side effects related to local anesthetic.      - continue ropivacaine 0.2% infusion rate at 14mL/hr, 7mL/hr each catheter  - Bolus Administered @ 9 am: 10 ml of 0.25% bupivacaine given, 5 ml to each catheter  - antithrombotic/thrombolytic therapy subcutaneous heparin ordered. - Please contact RAPS (#[phone]) prior to any medication changes  - will continue to follow and adjust as needed      Claude Cortez MD  Perioperative and Interventional Pain Service  10/11/2020 9:32 AM  [employer] 24-hour Job Code Pagers (for in-house use only, may text page using [employer])  [place]:  [phone]  [place]:  [phone]  Peds PIPS: [phone]

## 2020-10-11 NOTE — ADDENDUM NOTE
Addendum  created 10/11/20 1734 by Guru Low MD    Clinical Note Signed, Cosign clinical note with attestation

## 2020-10-11 NOTE — PLAN OF CARE
Vital signs:  Temp: 98.1  F (36.7  C) Temp src: Oral BP: 130/62 Pulse: 82   Resp: 16 SpO2: 96 % O2 Device: None (Room air)    Time: 3589-3582  Neuros: A&Ox4, calm.   Cardiac: WDL.   Respiratory: LS diminished, denies SOB. Sat 96% on RA.   Pain: c/o chest pain controlled with On-Q pump at 14 ml; scheduled tylenol and ibuprofen; PRN dilaudid x2 and oxycodone x 1 with relief.   Nausea: Zofran prn x1 for nausea, no emesis.   Diet: Regular diet.   GI/: Voiding spontaneously -not saving. Abd soft, non tender, +BS, +flatus, no BM.   Skin/Incisions/drains: transverse chest incision wound vac intact, no output; pigtail chest tube to water seal. Right LISA.   Lines: PIV x2 SL.   Labs: Reviewed.   Activity: Up ambulated to the bathroom with SBA.   Plan: Continue POC.

## 2020-10-11 NOTE — PROGRESS NOTES
REGIONAL ANESTHESIA PAIN SERVICE CONTINUOUS NERVE INFUSION NOTE  Julio Muñiz is a 19 year old male POD #2 s/p Modified Ravitch repair due to seere pectus excavatum with retrosternal bar placement x 2 and sternal plating and placement of bilateral T5-6 paravertebral (PV) catheter for pain control.    SUBJECTIVE:  Interval History:   No events overnight. Doing well this morning. Patient reports after boluses, his pain significantly improves. Currently, 5/10 pain with sitting. He denies any numbness, tingling, or ringing in the ears. Most of his pain is located in the RU chest near the insertion of his chest tube. No other complaints.     Antithrombotic/Thrombolytic Therapy ordered:  subcutaneous heparin 5000 units Q8H     Analgesic Medications:   Medications related to Pain Management (From now, onward)    Start     Dose/Rate Route Frequency Ordered Stop    10/10/20 0800  polyethylene glycol (MIRALAX) Packet 17 g      17 g Oral DAILY 10/10/20 0626      10/10/20 0800  senna-docusate (SENOKOT-S/PERICOLACE) 8.6-50 MG per tablet 1 tablet      1 tablet Oral 2 TIMES DAILY 10/10/20 0626      10/09/20 1200  acetaminophen (TYLENOL) tablet 975 mg      975 mg Oral EVERY 6 HOURS 10/09/20 1134      10/09/20 1200  ibuprofen (ADVIL/MOTRIN) tablet 600 mg      600 mg Oral EVERY 6 HOURS 10/09/20 1134      10/09/20 1135  HYDROmorphone (PF) (DILAUDID) injection 0.5 mg      0.5 mg Intravenous EVERY 4 HOURS PRN 10/09/20 1135      10/09/20 1134  oxyCODONE (ROXICODONE) tablet 5 mg      5 mg Oral EVERY 4 HOURS PRN 10/09/20 1134      10/09/20 1133  lidocaine 1 % 0.1-1 mL      0.1-1 mL Other EVERY 1 HOUR PRN 10/09/20 1133      10/09/20 1133  lidocaine (LMX4) kit       Topical EVERY 1 HOUR PRN 10/09/20 1133      10/09/20 0830  ropivacaine 0.2% (NAROPIN) 750 mL in ON-Q C-Bloc select flow ([id number] holds 600-750 mL) dual cath disposable pump      14 mL/hr  Irrigation CONTINUOUS 10/09/20 0742             OBJECTIVE:  Lab results  Recent Labs    Lab Test 10/10/20  0656   WBC 11.8*   RBC 4.29*   HGB 13.7   HCT 40.7   MCV 95   MCH 31.9   MCHC 33.7   RDW 12.4          Lab Results   Component Value Date    INR 1.0 09/17/2020         Vitals:    Temp:  [36.2  C (97.2  F)-36.7  C (98.1  F)] 36.2  C (97.2  F)  Pulse:  [64-89] 74  Resp:  [16-23] 16  BP: (119-130)/(58-64) 125/64  SpO2:  [90 %-97 %] 97 %    Exam:   GEN: Alert, NAD, sitting up in bed.   NEURO/MSK: Strength BLE 5/5   SKIN: bilateral paravertebral (PV) catheter site with dressing c/d/i, no tenderness, erythema, heme, edema    ASSESSMENT/PLAN:    Patient is receiving moderate relief with current analgesia multimodal therapy including bilateral T5-6 paravertebral (PV) catheter with infusion of ropivacaine 0.2%  at 14mL/hr, 7mL/hr each catheter.  Motor function and adequate sensory block. No evidence of adverse side effects related to local anesthetic.     - continue ropivacaine 0.2% infusion rate at 14mL/hr, 7mL/hr each catheter  - Bolus Administered @ 9 am: 10 ml of 0.25% bupivacaine given, 5 ml to each catheter  - antithrombotic/thrombolytic therapy subcutaneous heparin ordered. - Please contact RAPS (#[phone]) prior to any medication changes  - will continue to follow and adjust as needed    - discussed plan with attending anesthesiologist    Ren Chacon, DO  Regional Anesthesia Pain Service    RAPS Contact Info (24 hour job code pager is the last 4 digits) For in-house use only:   [employer] phone: [place] [phone], [place] [phone], [facility] [phone], then enter call-back number.    Text: Use [employer] on the Intranet <Paging/Directory> tab and enter Jobcode ID.   If no call back at any time, contact the hospital  and ask for RAPS attending or backup

## 2020-10-11 NOTE — PROGRESS NOTES
"  THORACIC SURGERY PROGRESS NOTE  October 11, 2020     S. No acute events. On RA. Tolerating reg diet. Continues to have pain on the chest tube site. Eating. Urinating.    O.  /62 (BP Location: Left arm)   Pulse 82   Temp 98.1  F (36.7  C) (Oral)   Resp 16   Ht 1.88 m (6' 2\")   Wt 67.9 kg (149 lb 11.1 oz)   SpO2 96%   BMI 19.22 kg/m       Alert and oriented  HDS. RRR  CTAB. Unlabored breathing on RA.   LISA drain with sero output  CT no output, incision site clean/dry  Abd soft, non tender, non distended  Skin warm and well perfused     Labs reviewed - WBC 9.5 from 11.8, Hgb 13.7 stable  Imaging reviewed    I/O last 3 completed shifts:  In: 920 [P.O.:920]  Out: 932 [Urine:757; Drains:175]     A/P: 19YM with severe pectus excavatum now POD2 S/p modified ravitch repair complicated by right sided ptx now with chest tube placement on right side. Continues to have left ptx which is closely being monitored    - Regular diet. Bowel regimen  - Pain control with S tylenol and ibuprofen. PV block. PRN oxycodone and dilaudid  - HDS  - AM Labs / CXR pending  - Continue chest tube to WS; potentially remove   - Encourage ambulation.   - PPX: Subcutaneous heparin TID  - Dispo: home     Seen with Thoracic Surgery fellow Dr. Morrell who d/w staff     Octavio Mayer MD GEOVANNA  PGY-1  Surgery  [facility]  Pager [phone]             "

## 2020-10-11 NOTE — PROGRESS NOTES
REGIONAL ANESTHESIA PAIN SERVICE CONTINUOUS NERVE INFUSION NOTE  Julio Muñiz is a 19 year old male POD #1 s/p Modified Ravitch repair due to seere pectus excavatum with retrosternal bar placement x 2 and sternal plating and placement of bilateral T5-6 paravertebral (PV) catheter for pain control.     SUBJECTIVE:  Interval History:   No acute overnight events. Patient received a bolus at around 1330 yesterday. Stated that he felt relief with bolus. Patient reports moderate pain control with nerve block continuous infusion and current analgesics. Rates intensity 8/10 this morning, worse with deep breaths. Denies weakness, paresthesias, circumoral numbness, metallic taste or tinnitus.     Antithrombotic/Thrombolytic Therapy ordered:  subcutaneous heparin 5000 units Q8H      Analgesic Medications:               Medications related to Pain Management (From now, onward)     Start     Dose/Rate Route Frequency Ordered Stop     10/10/20 0800   polyethylene glycol (MIRALAX) Packet 17 g      17 g Oral DAILY 10/10/20 0626       10/10/20 0800   senna-docusate (SENOKOT-S/PERICOLACE) 8.6-50 MG per tablet 1 tablet      1 tablet Oral 2 TIMES DAILY 10/10/20 0626       10/09/20 1200   acetaminophen (TYLENOL) tablet 975 mg      975 mg Oral EVERY 6 HOURS 10/09/20 1134       10/09/20 1200   ibuprofen (ADVIL/MOTRIN) tablet 600 mg      600 mg Oral EVERY 6 HOURS 10/09/20 1134       10/09/20 1135   HYDROmorphone (PF) (DILAUDID) injection 0.5 mg      0.5 mg Intravenous EVERY 4 HOURS PRN 10/09/20 1135       10/09/20 1134   oxyCODONE (ROXICODONE) tablet 5 mg      5 mg Oral EVERY 4 HOURS PRN 10/09/20 1134       10/09/20 1133   lidocaine 1 % 0.1-1 mL      0.1-1 mL Other EVERY 1 HOUR PRN 10/09/20 1133       10/09/20 1133   lidocaine (LMX4) kit        Topical EVERY 1 HOUR PRN 10/09/20 1133       10/09/20 0830   ropivacaine 0.2% (NAROPIN) 750 mL in ON-Q C-Bloc select flow ([id number] holds 600-750 mL) dual cath disposable pump      14 mL/hr   Irrigation CONTINUOUS 10/09/20 0742               OBJECTIVE:  Lab results      Recent Labs   Lab Test 10/10/20  0656   WBC 11.8*   RBC 4.29*   HGB 13.7   HCT 40.7   MCV 95   MCH 31.9   MCHC 33.7   RDW 12.4                  Lab Results   Component Value Date     INR 1.0 09/17/2020            Vitals:    Temp:  [35.7  C (96.2  F)-36.9  C (98.4  F)] 35.8  C (96.5  F)  Pulse:  [64-96] 88  Resp:  [10-23] 23  BP: ()/(33-76) 119/44  FiO2 (%):  [4 %] 4 %  SpO2:  [88 %-97 %] 90 %     Exam:   GEN: alert and mild distress  NEURO/MSK: Strength BLE 5/5   SKIN: bilateral paravertebral (PV) catheter site with dressing c/d/i, no tenderness, erythema, heme, edema     ASSESSMENT/PLAN:    Patient is receiving moderate relief with current analgesia multimodal therapy including bilateral T5-6 paravertebral (PV) catheter with infusion of ropivacaine 0.2%  at 14mL/hr, 7mL/hr each catheter.  Motor function and adequate sensory block. No evidence of adverse side effects related to local anesthetic.      - continue ropivacaine 0.2% infusion rate at 14mL/hr, 7mL/hr each catheter  - Bolus Administered @ 7am: 10 ml of 0.25% bupivacaine given, 5 ml to each catheter  - antithrombotic/thrombolytic therapy subcutaneous heparin ordered. - Please contact RAPS (#[phone]) prior to any medication changes  - will continue to follow and adjust as needed    Claude Cortez MD  Perioperative and Interventional Pain Service  10/10/2020 9:34 AM  PIPS 24-hour Job Code Pagers (for in-house use only, may text page using [employer])  [place]:  [phone]  [place]:  [phone]  Peds PIPS: [phone]

## 2020-10-11 NOTE — PLAN OF CARE
Vitals:    10/10/20 0827 10/10/20 1344 10/10/20 1620 10/10/20 1900   BP: 119/44 119/62 120/60 121/58   BP Location: Left arm  Left arm Left arm   Pulse: 88 80 89 79   Resp: 23 18 23 20   Temp: 96.5  F (35.8  C) 97.6  F (36.4  C) 97.4  F (36.3  C) 97.3  F (36.3  C)   TempSrc: Oral  Oral    SpO2: 90% 93% 90% 92%   Weight:       Height:           Neuro: alert and oriented     VS: afebrile, vital stable, sating 90% on room air, non labor breathing or SOB,    Cardiac: 79    Pain/Nausea: denies any nausea, tolerating intake, complain of back and chest tube site pain, oxy, dilaudid and epidural at 14 ml with relief,    Lines/Drains: right chest site LISA to bulb suction, pig tail chest tube to water seal,                        On-Q pump at 7 ml each,     Incision/Wound: mid chest transverse incision wound vac to 125,    GI/: +BS passing gas, no BM, voiding adequate,     Diet/Appetite: good appetite, tolerating good.     Activity: up and ambulated down the ribera with stand by assist     Plan:  X-ray still show pneumothorax, continue monitoring pt, continue with plan of care.

## 2020-10-12 ENCOUNTER — PATIENT OUTREACH (OUTPATIENT)
Dept: CARE COORDINATION | Facility: CLINIC | Age: 20
End: 2020-10-12

## 2020-10-12 ENCOUNTER — APPOINTMENT (OUTPATIENT)
Dept: GENERAL RADIOLOGY | Facility: CLINIC | Age: 20
DRG: 516 | End: 2020-10-12
Attending: PHYSICIAN ASSISTANT
Payer: COMMERCIAL

## 2020-10-12 ENCOUNTER — APPOINTMENT (OUTPATIENT)
Dept: GENERAL RADIOLOGY | Facility: CLINIC | Age: 20
DRG: 516 | End: 2020-10-12
Attending: THORACIC SURGERY (CARDIOTHORACIC VASCULAR SURGERY)
Payer: COMMERCIAL

## 2020-10-12 VITALS
BODY MASS INDEX: 19.21 KG/M2 | SYSTOLIC BLOOD PRESSURE: 127 MMHG | TEMPERATURE: 98.8 F | DIASTOLIC BLOOD PRESSURE: 66 MMHG | RESPIRATION RATE: 20 BRPM | OXYGEN SATURATION: 93 % | HEART RATE: 72 BPM | HEIGHT: 74 IN | WEIGHT: 149.69 LBS

## 2020-10-12 PROCEDURE — 96372 THER/PROPH/DIAG INJ SC/IM: CPT

## 2020-10-12 PROCEDURE — 71045 X-RAY EXAM CHEST 1 VIEW: CPT | Mod: 26 | Performed by: RADIOLOGY

## 2020-10-12 PROCEDURE — 250N000009 HC RX 250: Performed by: NURSE PRACTITIONER

## 2020-10-12 PROCEDURE — 250N000013 HC RX MED GY IP 250 OP 250 PS 637: Performed by: STUDENT IN AN ORGANIZED HEALTH CARE EDUCATION/TRAINING PROGRAM

## 2020-10-12 PROCEDURE — 32551 INSERTION OF CHEST TUBE: CPT | Mod: 78 | Performed by: PHYSICIAN ASSISTANT

## 2020-10-12 PROCEDURE — 250N000013 HC RX MED GY IP 250 OP 250 PS 637

## 2020-10-12 PROCEDURE — 71045 X-RAY EXAM CHEST 1 VIEW: CPT | Mod: 77

## 2020-10-12 PROCEDURE — 99232 SBSQ HOSP IP/OBS MODERATE 35: CPT | Performed by: NURSE PRACTITIONER

## 2020-10-12 PROCEDURE — 250N000009 HC RX 250

## 2020-10-12 PROCEDURE — 999N000065 XR CHEST PORT 1 VW

## 2020-10-12 PROCEDURE — 71045 X-RAY EXAM CHEST 1 VIEW: CPT

## 2020-10-12 PROCEDURE — 250N000011 HC RX IP 250 OP 636

## 2020-10-12 PROCEDURE — 0W9B30Z DRAINAGE OF LEFT PLEURAL CAVITY WITH DRAINAGE DEVICE, PERCUTANEOUS APPROACH: ICD-10-PCS | Performed by: PHYSICIAN ASSISTANT

## 2020-10-12 PROCEDURE — 99207 PR CDG-MDM COMPONENT: MEETS MODERATE - DOWN CODED: CPT | Performed by: NURSE PRACTITIONER

## 2020-10-12 RX ORDER — OXYCODONE HYDROCHLORIDE 5 MG/1
5-10 TABLET ORAL EVERY 4 HOURS PRN
Qty: 30 TABLET | Refills: 0 | Status: SHIPPED | OUTPATIENT
Start: 2020-10-12 | End: 2020-12-10

## 2020-10-12 RX ORDER — ACETAMINOPHEN 325 MG/1
975 TABLET ORAL EVERY 6 HOURS
COMMUNITY
Start: 2020-10-12

## 2020-10-12 RX ORDER — IBUPROFEN 600 MG/1
600 TABLET, FILM COATED ORAL EVERY 6 HOURS
COMMUNITY
Start: 2020-10-12

## 2020-10-12 RX ORDER — MAGNESIUM CARB/ALUMINUM HYDROX 105-160MG
296 TABLET,CHEWABLE ORAL ONCE
Status: COMPLETED | OUTPATIENT
Start: 2020-10-12 | End: 2020-10-12

## 2020-10-12 RX ORDER — LIDOCAINE HYDROCHLORIDE 10 MG/ML
INJECTION, SOLUTION EPIDURAL; INFILTRATION; INTRACAUDAL; PERINEURAL
Status: COMPLETED
Start: 2020-10-12 | End: 2020-10-12

## 2020-10-12 RX ORDER — BUPIVACAINE HYDROCHLORIDE 5 MG/ML
5 INJECTION, SOLUTION EPIDURAL; INTRACAUDAL ONCE
Status: COMPLETED | OUTPATIENT
Start: 2020-10-12 | End: 2020-10-12

## 2020-10-12 RX ORDER — AMOXICILLIN 250 MG
1 CAPSULE ORAL 2 TIMES DAILY
Qty: 50 TABLET | Refills: 0 | Status: SHIPPED | OUTPATIENT
Start: 2020-10-12 | End: 2020-12-10

## 2020-10-12 RX ADMIN — OXYCODONE HYDROCHLORIDE 5 MG: 5 TABLET ORAL at 04:44

## 2020-10-12 RX ADMIN — LIDOCAINE HYDROCHLORIDE 300 MG: 10 INJECTION, SOLUTION EPIDURAL; INFILTRATION; INTRACAUDAL; PERINEURAL at 13:31

## 2020-10-12 RX ADMIN — HEPARIN SODIUM 5000 UNITS: 5000 INJECTION, SOLUTION INTRAVENOUS; SUBCUTANEOUS at 00:46

## 2020-10-12 RX ADMIN — IBUPROFEN 600 MG: 600 TABLET ORAL at 11:08

## 2020-10-12 RX ADMIN — HEPARIN SODIUM 5000 UNITS: 5000 INJECTION, SOLUTION INTRAVENOUS; SUBCUTANEOUS at 08:17

## 2020-10-12 RX ADMIN — ACETAMINOPHEN 975 MG: 325 TABLET, FILM COATED ORAL at 11:08

## 2020-10-12 RX ADMIN — ACETAMINOPHEN 975 MG: 325 TABLET, FILM COATED ORAL at 04:44

## 2020-10-12 RX ADMIN — DOCUSATE SODIUM 50 MG AND SENNOSIDES 8.6 MG 1 TABLET: 8.6; 5 TABLET, FILM COATED ORAL at 08:17

## 2020-10-12 RX ADMIN — MAGNESIUM CITRATE 296 ML: 1.75 LIQUID ORAL at 06:21

## 2020-10-12 RX ADMIN — BUPIVACAINE HYDROCHLORIDE 25 MG: 5 INJECTION, SOLUTION EPIDURAL; INTRACAUDAL at 08:30

## 2020-10-12 RX ADMIN — HYDROMORPHONE HYDROCHLORIDE 0.5 MG: 1 INJECTION, SOLUTION INTRAMUSCULAR; INTRAVENOUS; SUBCUTANEOUS at 10:18

## 2020-10-12 RX ADMIN — POLYETHYLENE GLYCOL 3350 17 G: 17 POWDER, FOR SOLUTION ORAL at 08:17

## 2020-10-12 RX ADMIN — IBUPROFEN 600 MG: 600 TABLET ORAL at 04:44

## 2020-10-12 ASSESSMENT — ACTIVITIES OF DAILY LIVING (ADL)
ADLS_ACUITY_SCORE: 15

## 2020-10-12 NOTE — PROGRESS NOTES
Regional Anesthesia Pain Service Nerve Block Daily Progress Note    Julio Muñiz is a 19 year old male with severe pectus excavatum who is now POD #3 s/p Modified Ravitch Repair complicated by right sided pneumothorax with chest tube placement, left pneumothorax thoracic for CT placement       Subjective    24 hour Interval History  - no acute events  - Pain: location: anterior chest, and R) CT site currently 4/10, gets good analgesia with nerve block bolus paravertebral (PV) catheters, and analgesic meds as ordered  - Diet: tolerating regular dies, denies nausea  - Activity: ambulating with standby assist  - denies LE weakness, paresthesias, circumoral numbness, metallic taste, tinnitus     Antithrombotic or Thrombolytic Therapy ordered:   heparin ANTICOAGULANT injection 5,000 Units, SC, Q8H         Analgesic Medications ordered:  Medications related to Pain Management (From now, onward)    Start     Dose/Rate Route Frequency Ordered Stop    10/12/20 0000  oxyCODONE (ROXICODONE) 5 MG tablet      5-10 mg Oral EVERY 4 HOURS PRN 10/12/20 1119      10/12/20 0000  acetaminophen (TYLENOL) 325 MG tablet      975 mg Oral EVERY 6 HOURS 10/12/20 1119      10/12/20 0000  ibuprofen (ADVIL/MOTRIN) 600 MG tablet      600 mg Oral EVERY 6 HOURS 10/12/20 1119      10/12/20 0000  senna-docusate (SENOKOT-S/PERICOLACE) 8.6-50 MG tablet      1 tablet Oral 2 TIMES DAILY 10/12/20 1119      10/10/20 0800  polyethylene glycol (MIRALAX) Packet 17 g      17 g Oral DAILY 10/10/20 0626      10/10/20 0800  senna-docusate (SENOKOT-S/PERICOLACE) 8.6-50 MG per tablet 1 tablet      1 tablet Oral 2 TIMES DAILY 10/10/20 0626      10/09/20 1200  acetaminophen (TYLENOL) tablet 975 mg      975 mg Oral EVERY 6 HOURS 10/09/20 1134      10/09/20 1200  ibuprofen (ADVIL/MOTRIN) tablet 600 mg      600 mg Oral EVERY 6 HOURS 10/09/20 1134      10/09/20 1135  HYDROmorphone (PF) (DILAUDID) injection 0.5 mg      0.5 mg Intravenous EVERY 4 HOURS PRN 10/09/20  1135      10/09/20 1134  oxyCODONE (ROXICODONE) tablet 5 mg      5 mg Oral EVERY 4 HOURS PRN 10/09/20 1134      10/09/20 1133  lidocaine 1 % 0.1-1 mL      0.1-1 mL Other EVERY 1 HOUR PRN 10/09/20 1133      10/09/20 1133  lidocaine (LMX4) kit       Topical EVERY 1 HOUR PRN 10/09/20 1133      10/09/20 0830  ropivacaine 0.2% (NAROPIN) 750 mL in ON-Q C-Bloc select flow ([id number] holds 600-750 mL) dual cath disposable pump      14 mL/hr  Irrigation CONTINUOUS 10/09/20 0742              Objective  Exam  Vitals:   Temp:  [98.1  F (36.7  C)-99.4  F (37.4  C)] 98.8  F (37.1  C)  Pulse:  [72-86] 72  Resp:  [16-20] 20  BP: (117-127)/(49-69) 127/66  SpO2:  [93 %-100 %] 93 %     General: Alert, oriented, NAD  MSK/Neuro: Full Strength BLE.    Skin: bilateral paravertebral (PV) catheter sites with dressing c/d/i, no tenderness, erythema, heme, edema       Labs/Diagnostics  Heme/INR:  Recent Labs   Lab Test 10/11/20  0707 10/10/20  0656   WBC 9.5 11.8*   RBC 4.34* 4.29*   HGB 13.7 13.7   HCT 42.3 40.7   MCV 98 95   MCH 31.6 31.9   MCHC 32.4 33.7   RDW 12.4 12.4    189       Lab Results   Component Value Date    INR 1.0 09/17/2020          Assessment/Plan  ASSESSMENT  Julio Muñiz is a 19 year old male with severe pectus excavatum who is now POD #3 s/p Modified Ravitch Repair complicated by right sided pneumothorax with chest tube placement, left pneumothorax thoracic for CT placement     Pain adequately controlled, tolerating nerve block infusion.  Motor function intact and adequate sensory block. No evidence of adverse side effects related to local anesthetic continuous infusion. Patient will benefit from local anesthetic bolus.      0830 Clinician Local Anesthetic Bolus  VSS  - MEDICATION: PF bupivacaine 0.25% 5 mL via right and left nerve block catheters, Total given 10mL  - PROCEDURE: Clinician bolus administered incrementally; negative aspirate.  No symptoms of local anesthetic systemic toxicity (LAST). Remained with  and assessed patient for 10 min post-injection. BP, P and MAP stable  - POST-PROCEDURE: Bedside RN aware of need to continue BP, P and MAP monitoring Q 10 min for an additional 20 min. Contact RAPS (jobcode ID [id number]) if experiencing any untoward effects or MAP less than 60     0845 Follow up: Patient reports pain Getting better, rating 2-3/10    PLAN  - Nerve block infusion: continue ROpivacaine 0.2% infusion at 14 mL/hr, 7mL/hr each catheter, plan to maintain catheters for a max of 7 days, patient is a candidate to discharge home with nerve block catheters in place    expected change of On-Q device is tomorrow    patient can be evaluated to receive local anesthetic bolus Q 12 hr PRN pain control.  Bedside RN must page RAPS to request bolus    - Anticoagulation: okay to continue subcutaneous heparin Q 8 hrs. Please contact RAPS jobcode pager [phone] before ordering or making any medication changes    -  Follow up: will continue to follow and adjust as needed    Discussed with attending anesthesiologist     --  ROGER Harmon [facility]  Regional Anesthesia Pain Service  10/12/2020 2:52 PM    RAPS Contact Info (24 hour job code pager is the last 4 digits) For in-house use only:   Job code ID: [id number]   [place] 0599  Peds [phone]  [employer] phone: dial * * * [phone], enter jobcode ID, then enter call-back number.    Text: Use [email or web address] on the Intranet <Paging/Directory> tab and enter Jobcode ID.   If no call back at any time, contact the hospital  and ask for RAPS attending or backup

## 2020-10-12 NOTE — PROCEDURES
Thoracic Surgery Procedure Note  Preprocedure Diagnosis:  Left pneumothorax  Postprocedure Diagnosis:  Left pneumothorax  Procedure:  Left sided chest tube placement .  (8 F in 2nd intercostal space at  mid axillary line.)     Premed:  0.5 mg Dilaudid IV  Additional Meds:  1% lidocaine without epinephrine     Julio Muñiz was prepped and draped in the usual fashion. Lidocaine was locally injected into the subcutaneous tissue as well as periostium and the pleural space.   A larger bore finder needle was then introduced into the same puncture site and produced good return of air indicating presence in the pleural space.  A wire was then placed through the finder needle without resistance.  Initially a 14 [place] pigtail catheter was attempted.  After meeting resistance in the subcutaneous space, the catheter was withdrawn and was noted to be torn at 1 of the sideholes.  A smaller catheter was then inserted without resistance and with good return of air. The tube was sutured in place and dressing applied. The patient tolerated the procedure well, and there were no complications.      A STAT CXR was ordered to confirm proper placement.     Travis Blue PA-C  Thoracic & Foregut Surgery     Dr. Alvarez was immediately available for assistance

## 2020-10-12 NOTE — PROGRESS NOTES
REGIONAL ANESTHESIA PAIN SERVICE (RAPS) EVALUATION:  - Time: 7:30 PM  - Evaluation: Patient reports pain intensity with current therapy 5/10 at rest and 5/10 with activity  General: healthy, alert and no distress  Catheter system integrity: intact  Skin: dressing clean, dry and intact  Motor intact  Current vitals: /60    - Assessment/Plan    PAIN: moderately controlled, boluses help significantly    INTERVENTION: bolus  Bolus administered    MEDICATION: PF bupivacaine 0.25%, total bolus 10 mL, 5 mL via each catheter    PROCEDURE: Clinician bolus via PV nerve block catheter; administered without complication with negative aspirate before and between each mL.    No symptoms of local anesthetic systemic toxicity (LAST). Remained with and assessed patient for 10 min post-injection. BP, P and MAP stable    POST-PROCEDURE: Bedside RN aware of need to continue BP, P and MAP monitoring Q 10 min for an additional 30 min. Contact RAPS  if any of the following: patient experiencing any untoward effects, SBP< 90, P < 50 or > 120, MAP < 60     - patient can be evaluated to receive local anesthetic bolus Q 12 hr PRN pain not controlled with continuous infusion.  Bedside nurse must page RAPS to request bolus    Ren Chacon, DO    RAPS Contact Info (24 hour job code pager is the last 4 digits) For in-house use only:  [employer] phone: [place] [phone], [place] [phone], [place] [phone], then enter call-back number.    Text: Use [employer] on the Intranet <Paging/Directory> tab and enter Jobcode ID.   If no call back at any time, contact the hospital  and ask for RAPS attending or backup

## 2020-10-12 NOTE — PLAN OF CARE
Vital signs:  Temp: 98.8  F (37.1  C) Temp src: Oral BP: 119/52 Pulse: 86   Resp: 20 SpO2: 100 % O2 Device: None (Room air)     Time: 3497-6080  Neuros: A&Ox4, calm.   Cardiac: WDL.   Respiratory: LS clear, on room air, denies SOB.   Pain: resting comfortably in bed, pain controlled with On-Q pump at 14 ml; scheduled tylenol and ibuprofen given with relief.   Nausea: Denies nausea.   Diet: Regular diet, good appetite.   GI/: Voiding spontaneously -not saving. Abd soft, non tender, +BS, +flatus, no BM. Magnesium citrate solution given this am.   Skin/Incisions/drains: transverse chest incision wound vac intact, no output. Right LISA.   Lines: PIV x2 SL.   Labs: Reviewed.   Activity: Up ambulated to the bathroom with SBA.   Plan: Continue POC.

## 2020-10-12 NOTE — PROGRESS NOTES
"  THORACIC SURGERY PROGRESS NOTE  October 12, 2020     S. No acute events. Pain well controlled with on-Q. RA. Tolerating reg diet. Continues to have pain on the chest tube site. Urinating. Awaiting BM.     O.  /52 (BP Location: Left arm)   Pulse 86   Temp 98.8  F (37.1  C) (Oral)   Resp 20   Ht 1.88 m (6' 2\")   Wt 67.9 kg (149 lb 11.1 oz)   SpO2 100%   BMI 19.22 kg/m       Alert and oriented  HDS. RRR  CTAB. Unlabored breathing on RA.   LISA drain with minimal serosanginous output  Wound vac in place - removed at bedside  Incision site clean/dry  Abd soft, non tender, non distended  Skin warm and well perfused     Labs reviewed - pending AM labs  Imaging reviewed - pending AM CXR    I/O last 3 completed shifts:  In: 1200 [P.O.:1200]  Out: 310 [Urine:200; Drains:110]     A/P: 19YM with severe pectus excavatum now POD3 S/p modified ravitch repair complicated by right sided ptx now with chest tube placement on right side. Continues to have left ptx which is closely being monitored     Plan:  - Remove wound vac, will leave LISA drain in place  - Touch base with RAPS regarding on Q regarding discharge  - Mag Citrate for BM    Today:  - Regular diet. Bowel regimen + Mag citrate   - Pain control with S tylenol and ibuprofen. PV block. PRN oxycodone and dilaudid  - HDS  - AM Labs / CXR pending  - Encourage ambulation.   - PPX: Subcutaneous heparin TID  - Dispo: home today     Seen with Thoracic Surgery fellow Dr. Morrell who d/w staff     Octavio Mayer MD GEOVANNA  PGY-1  Surgery  [facility]  Pager [phone]         "

## 2020-10-12 NOTE — PLAN OF CARE
Vitals:    10/11/20 0909 10/11/20 1225 10/11/20 1630 10/11/20 1955   BP: 125/64 123/49 117/69 121/49   BP Location: Left arm Left arm Left arm Left arm   Pulse: 74 86 72 72   Resp: 16 16 16 20   Temp:  97.2  F (36.2  C) 98.1  F (36.7  C) 99.4  F (37.4  C)   TempSrc:  Oral Axillary Oral   SpO2: 97% 92% 96% 93%   Weight:       Height:        Afebrile vital stable, sating 96% on room air, non labor breathing,  Mid chest incision wound vac at 125, no out put, chest tube removed, LISA to bulb suction, tolerating intake, Oxy and dilaudid for pain with relief, On-Q pump bolus given, up ambulated down the ribera, possible discharge home tomorrow. Continue with plan of care.

## 2020-10-12 NOTE — PLAN OF CARE
"Vital signs:  /52 (BP Location: Left arm)   Pulse 86   Temp 98.8  F (37.1  C) (Oral)   Resp 20   Ht 1.88 m (6' 2\")   Wt 67.9 kg (149 lb 11.1 oz)   SpO2 100%   BMI 19.22 kg/m      Shift: 0700--pt discharged around 1515     Activity: independent   Neuros: A&O x4  Cardiac:WDL, denies chest pain   Respiratory: :Ls:clear/diminished, denies SOB at rest, using IS, adequate oxygen on room air  GI/: +BS, +BM, voiding adequately   Diet: Reg  Skin: wound vac removed today, lateral midline incision, R abd LISA, L CT placed today in the morning and removed prior to discharge  Lines: pt left with LISA drain, All PIVs removed prior to discharge  Pain/nausea:pain controlled with tylenol, ibuprofen, & 1x dose of dilaudid prior to CT insertion, denies nausea   Plan:Cont POC     Writer went over discharge instructions with pt and pt verbalized understanding. Writer also summarized discharge instructions to pt's mother and mother verbalized understanding. All PIVs were removed prior to discharge. Pt left with OnQ @ 14 ml/hr and pt and mother understood directions to take it out when OnQ ran dry. Pt's meds were picked up by mother before discharging. IS was sent with pt    "

## 2020-10-13 ENCOUNTER — TELEPHONE (OUTPATIENT)
Facility: CLINIC | Age: 20
End: 2020-10-13

## 2020-10-13 NOTE — TELEPHONE ENCOUNTER
REGIONAL ANESTHESIA PAIN SERVICE PHONE CALL NOTE   SUBJECTIVE  Interval History: Patient is getting washed up, not available for call. Spoke with his mother.  Patient tolerating nerve block catheter/infusion, has only required a couple oxycodone since yesterday.       ASSESSMENT/PLAN  Julio Muñiz is a 19 year old male POD #Julio Muñiz is a 19 year old male with severe pectus excavatum who is now POD #3 s/p Modified Ravitch Repair on 10/9, catheter day #4 placement of bilateral T5-6 paravertebral (PV) catheters analgesia.  Pt discharged from hospital yesterday 10/12/20 with nerve block catheters and infusion via ON-Q.  Current infusion ROpivacaine 0.2% 14 mL/hr (7mL/hr each side).  No evidence of adverse side effects associated with local anesthetic. Mother received verbal instruction on care of nerve block infusion/catheter and verbalizes understanding of plan.    - continue current total infusion ROpivacaine 0.2% at 14mL/hour (7mL/hr each side)   - pt can do a clamp trial x 4 hrs today and mother will remove catheters if pain remains adequately controlled.  If increased pain resume infusion and plan to remove PV catheters tomorrow AM  - patient has phone number to call RAPS and should not hesitate to call if questions or concerns  - discussed plan with attending anesthesiologist   For questions or concerns please contact the Regional Anesthesia Pain Service (RAPS).  RAPS Contact Info  Call [phone], to page enter the ID# [id number], then leave call back number.  Someone will call you back.  If your call is not returned, call [phone] and ask the hospital  to contact the backup staff MD for RAPS    ROGER Harmon CNP  October 13, 2020  2:22 PM

## 2020-10-13 NOTE — DISCHARGE SUMMARY
NAME: Julio Muñiz   MRN: [id number]   : 2000     DATE OF ADMISSION: 10/9/2020     PRE/POSTOPERATIVE DIAGNOSES: Severe pectus excavatum    PROCEDURES PERFORMED: Modified Ravitch repair with retrosternal bar x2 and sternal plating with KLS Sebastien plating system    PATHOLOGY RESULTS: None    CULTURE RESULTS: None     INTRAOPERATIVE COMPLICATIONS: None     POSTOPERATIVE COMPLICATIONS: Left-sided pneumothorax requiring temporary bedside chest tube placement prior to discharge    DRAINS/TUBES PRESENT AT DISCHARGE: Subcutaneous Ern drain, paravertebral nerve block catheter    DATE OF DISCHARGE:  2020     HOSPITAL COURSE: Julio Muñiz is a 19 year old male who on 10/9/2020 underwent the above-named procedures.  He tolerated the operation well and postoperatively was transferred to the general post-surgical unit.  The remainder of his course was essentially uncomplicated with the exception of the above-noted pneumothorax.  Prior to discharge, his pain was controlled well, he was able to perform ADLs and ambulate independently without difficulty, and had full return of bowel and bladder function.  On 2020, he was discharged to home in stable condition.    DISCHARGE EXAM:   A&O, NAD  Resp non-labored  Distal extremities warm    Incisions healing well    DISCHARGE INSTRUCTIONS:  Discharge Procedure Orders   X-ray Chest 2 vws*   Standing Status: Future Standing Exp. Date: 21     Order Specific Question Answer Comments   Priority Routine      Adult [facility] Follow-up and recommended labs and tests   Order Comments: 1.) Follow up with primary care physician, Reese Kothari, in 1-2 weeks.  2.) Follow up with a local surgery group or with [facility] in ~ 1 week for LISA drain and suture removal  3.) Follow up with Maya Alvarez MD in Thoracic Surgery clinic in 1 month, prior to which a CXR should be performed.     Appointments on [place] and/or [facility] (with [facility] or [facility] provider  or service). Call [phone] if you haven't heard regarding these appointments within 7 days of discharge.     Tubes   Order Comments: DRAIN INSTRUCTIONS  *Empty the drain contents daily (or more often if necessary if the bulb fills).  Record the drainage volume daily until seen in follow up, which will assist with the decision for removal.  Call the thoracic team regularly (i.e [facility]) to keep us informed as to the volume of output.     If the output is less than 30cc per day for 3 days, we will likely make an appointment for drain removal.     Keep Flexi-track on your tube at all times for additional securement.  There should be a small amount of slack between the Flexi-track and insertion site.  This will help to ensure the tube is not inadvertently removed.     Discharge Instructions   Order Comments: THORACIC SURGERY DISCHARGE INSTRUCTIONS    DIET: Regular diet - as prior to admission     If your plans upon discharge include prolonged periods of sitting (i.e a lengthy car or plane ride), it is highly beneficial to get up and walk at least once per hour to help prevent swelling and blood clots.     You may remove chest tube dressing 48 hours after tube removal and bandage the site at your own discretion thereafter.  Small amounts of leakage are normal for 2-3 days after removal.  Feel free to call with questions.    You may get incision wet 2 days after operation. Do not submerge, soak, or scrub incision or swim until seen in follow-up.    Take incentive spirometer home for continued frequent use    Activity as tolerated, no strenous activity until seen in follow-up, no lifting greater than 20 pounds for a minimum of 4 weeks.    Stay hydrated. Take over the counter fiber (metamucil or benefiber) and stool softeners (Miralax, docusate or senna) if becoming constipated.     Call for fever greater than 101.5, chills, increased size of incision, red skin around incision, vision changes, muscle strength changes,  "sensation changes, shortness of breath, or other concerns.    No driving while taking narcotic pain medication.    Transition to ibuprofen or tylenol/acetaminophen for pain control. Do not take tylenol/acetaminophen and acetaminophen containing narcotic (e.g., percocet or vicodin) at the same time. If you have known ulcer problems, or kidney trouble (elevated creatinine) do not take the ibuprofen.    In emergencies, call 911    For other Questions or Concerns;   A.) During weekday working hours (Monday through Friday 8am to 4:30pm)   call [phone] and ask to speak to a clinical nurse specialist.     B.) At nights (after 4:30pm), on weekends, or if urgent call [phone] and   tell the  \"I would like to page job code [phone], the thoracic surgery   fellow on call, please.\"     Reason for your hospital stay   Order Comments: Pectus excavatum repair     Activity   Order Comments: Your activity upon discharge: activity as tolerated     Order Specific Question Answer Comments   Is discharge order? Yes      Diet   Order Comments: Follow this diet upon discharge: Orders Placed This Encounter      Regular Diet Adult     Order Specific Question Answer Comments   Is discharge order? Yes        DISCHARGE MEDICATIONS:   Discharge Medication List as of 10/12/2020  2:22 PM      START taking these medications    Details   acetaminophen (TYLENOL) 325 MG tablet Take 3 tablets (975 mg) by mouth every 6 hours, OTC      ibuprofen (ADVIL/MOTRIN) 600 MG tablet Take 1 tablet (600 mg) by mouth every 6 hours, OTC      oxyCODONE (ROXICODONE) 5 MG tablet Take 1-2 tablets (5-10 mg) by mouth every 4 hours as needed for moderate to severe pain, Disp-30 tablet, R-0, E-Prescribe      senna-docusate (SENOKOT-S/PERICOLACE) 8.6-50 MG tablet Take 1 tablet by mouth 2 times daily, Disp-50 tablet, R-0, E-Prescribe                       "

## 2020-10-14 ENCOUNTER — TELEPHONE (OUTPATIENT)
Facility: CLINIC | Age: 20
End: 2020-10-14

## 2020-10-14 NOTE — TELEPHONE ENCOUNTER
[facility] PHONE CALL NOTE    Called patient's (Junior) cell number and no answer  Called home phone number and patient's mother (Dayna) answered phone.  States Junior is in the bathroom washing up and brushing his teeth.  She reports that she removed nerve block catheters at 2200 yesterday without complication.  Dark tips intact.  Patient reporting more soreness this morning that he expected without nerve block infusion.  Dayna denies questions or concerns, she will let Junior know about the call and states she still has [facility] phone number to call if any questions/concerns arise.      [facility] will sign off  Thank you for the opportunity to participate in the care of Julio Muñiz   Discussed with staff anesthesiologist  For questions or concerns please contact the [facility] ([facility]).  [facility] Contact Info  Call [phone], to page enter the ID# [id number], then leave call back number.  Someone will call you back.  If your call is not returned, call [phone] and ask the hospital  to contact the backup staff MD for [facility]  Sasha Dejesus, ROGER CNP  October 14, 2020  11:48 AM

## 2020-10-15 ENCOUNTER — TELEPHONE (OUTPATIENT)
Dept: SURGERY | Facility: CLINIC | Age: 20
End: 2020-10-15

## 2020-10-15 DIAGNOSIS — Q67.6 PECTUS EXCAVATUM: Primary | ICD-10-CM

## 2020-10-15 NOTE — TELEPHONE ENCOUNTER
----- Message from Krystina Mendoza sent at 10/15/2020 11:09 AM CDT -----  Regarding: f/up & referral  Hi Kerry,  Pts' mother called to get post op appt set up for 11/4 with you. She had a few q's- would this need to be a in person visit as they live 3 hrs away? They are ok either way.     Also:     Needs referral for LISA drain & suture removal, do be done @ [facility]. She said she called to get this appt taken care of and could not do so without the referral. She would like to set up an appt with:    Dr. Richard López general surg a @ [facility].     Thanks and let me know if you have any questions!  Happy Thursday.    Krystina CORNEJO + [facility] Coordinator  Dr. Jovon Goncalves  [facility]    [facility]  [phone] [facility]  #[phone] Direct

## 2020-10-21 ENCOUNTER — OFFICE VISIT (OUTPATIENT)
Dept: SURGERY | Facility: OTHER | Age: 20
End: 2020-10-21
Attending: SURGERY
Payer: COMMERCIAL

## 2020-10-21 VITALS
WEIGHT: 145 LBS | SYSTOLIC BLOOD PRESSURE: 124 MMHG | TEMPERATURE: 97.3 F | HEIGHT: 74 IN | BODY MASS INDEX: 18.61 KG/M2 | DIASTOLIC BLOOD PRESSURE: 64 MMHG | HEART RATE: 91 BPM | OXYGEN SATURATION: 98 %

## 2020-10-21 DIAGNOSIS — Q67.6 PECTUS EXCAVATUM: ICD-10-CM

## 2020-10-21 DIAGNOSIS — Z98.890 POSTOPERATIVE STATE: Primary | ICD-10-CM

## 2020-10-21 PROCEDURE — 99202 OFFICE O/P NEW SF 15 MIN: CPT | Performed by: SURGERY

## 2020-10-21 ASSESSMENT — PAIN SCALES - GENERAL: PAINLEVEL: MODERATE PAIN (4)

## 2020-10-21 ASSESSMENT — MIFFLIN-ST. JEOR: SCORE: 1732.47

## 2020-10-21 NOTE — NURSING NOTE
"Chief Complaint   Patient presents with     Consult     Suture removal-possible LISA drain removal-s/p- modified ravitch repair with retrosternal bar x2 and sternal plating with KLS Sebastien plating system 10/29/20-Dr Fabien Bales       Initial /64 (BP Location: Left arm, Patient Position: Chair, Cuff Size: Adult Regular)   Pulse 91   Temp 97.3  F (36.3  C) (Tympanic)   Ht 1.88 m (6' 2\")   Wt 65.8 kg (145 lb)   SpO2 98%   BMI 18.62 kg/m   Estimated body mass index is 18.62 kg/m  as calculated from the following:    Height as of this encounter: 1.88 m (6' 2\").    Weight as of this encounter: 65.8 kg (145 lb).  Medication Reconciliation: complete  ANGIE GOMEZ LPN    "

## 2020-10-26 NOTE — PROGRESS NOTES
"[facility] Progress Note    HPI: Returns to clinic for follow up of a modified Ravitch repair. He is here by way of his primary surgeon for drain removal and suture repair.       S: He has had aprox 30cc of drainage over the last three days. The drain is causing quite a bit of discomfort with even moving his right arm.     O:   Vitals:  /64 (BP Location: Left arm, Patient Position: Chair, Cuff Size: Adult Regular)   Pulse 91   Temp 97.3  F (36.3  C) (Tympanic)   Ht 1.88 m (6' 2\")   Wt 65.8 kg (145 lb)   SpO2 98%   BMI 18.62 kg/m        Physical Exam:  G: alert oriented, no acute distress   ENT: sclera non-icteric   Chest: 3 silk sutures at midline of chest. Surgical incision well healed without erythema or drainage. No swelling.   CVS: RRR  ABD: soft non-tender non-distended   Ext: WWP     Assessment/Plan:    Doing well, volumes of drainage right at boarder for removal. Removed stitches and drain without complication. Follow up PRN.       Inocencio López MD   "

## 2020-10-28 DIAGNOSIS — Q67.6 PECTUS EXCAVATUM: Primary | ICD-10-CM

## 2020-11-03 ENCOUNTER — HOSPITAL ENCOUNTER (OUTPATIENT)
Dept: GENERAL RADIOLOGY | Facility: HOSPITAL | Age: 20
Discharge: HOME | End: 2020-11-03
Attending: PHYSICIAN ASSISTANT | Admitting: PHYSICIAN ASSISTANT
Payer: COMMERCIAL

## 2020-11-03 DIAGNOSIS — Q67.6 PECTUS EXCAVATUM: ICD-10-CM

## 2020-11-03 PROCEDURE — 71046 X-RAY EXAM CHEST 2 VIEWS: CPT

## 2020-11-04 ENCOUNTER — VIRTUAL VISIT (OUTPATIENT)
Dept: SURGERY | Facility: CLINIC | Age: 20
End: 2020-11-04
Attending: CLINICAL NURSE SPECIALIST
Payer: COMMERCIAL

## 2020-11-04 DIAGNOSIS — Q67.6 PECTUS EXCAVATUM: Primary | ICD-10-CM

## 2020-11-04 PROCEDURE — 99024 POSTOP FOLLOW-UP VISIT: CPT | Mod: 95 | Performed by: CLINICAL NURSE SPECIALIST

## 2020-11-04 NOTE — LETTER
"    11/4/2020         RE: Julio Muñiz  [address] [place] [facility] [address]        Dear Colleague,    Thank you for referring your patient, Julio Muñiz, to the [facility]. Please see a copy of my visit note below.    Julio Muñiz is a 19 year old male who is being evaluated via a billable telephone visit.      The patient has been notified of following:     \"This telephone visit will be conducted via a call between you and your physician/provider. We have found that certain health care needs can be provided without the need for a physical exam.  This service lets us provide the care you need with a short phone conversation.  If a prescription is necessary we can send it directly to your pharmacy.  If lab work is needed we can place an order for that and you can then stop by our lab to have the test done at a later time.    Telephone visits are billed at different rates depending on your insurance coverage. During this emergency period, for some insurers they may be billed the same as an in-person visit.  Please reach out to your insurance provider with any questions.    If during the course of the call the physician/provider feels a telephone visit is not appropriate, you will not be charged for this service.\"    Patient has given verbal consent for Telephone visit?  Yes    What phone number would you like to be contacted at? [phone]    How would you like to obtain your AVS? Finesse     Vitals - Patient Reported  Weight (Patient Reported): 65.8 kg (145 lb)  Height (Patient Reported): 188 cm (6' 2.02\")  BMI (Based on Pt Reported Ht/Wt): 18.61  Pain Score: No Pain (0)    Floyd Saleh LPN    THORACIC SURGERY FOLLOW UP VISIT  Dear Dr. López,  I spoke by telephone to Mr. Julio Muñiz and his mother in follow-up today. The clinical summary follows:     PREOP DIAGNOSIS   Severe pectus excavatum  PROCEDURE   Modified Ravitch repair with retrosternal bar x2 and sternal " "plating with KLS Sebastien plating system  DATE OF PROCEDURE  10/9/2020    COMPLICATIONS  None    INTERVAL STUDIES  CXR yesterday:  FINDINGS:   The cardiac silhouette is normal in size. The pulmonary vasculature is  normal.  The lungs are clear. No pleural effusion or pneumothorax.  Metallic fixation devices from the pectus excavatum deformity repair  noted.                                                        IMPRESSION:  No acute cardiopulmonary disease.       SUBJECTIVE  I am doing well-   I take 800 mg ibuprofen twice a day.    I saw Dr. López this week and he removed my drain.    Incisions are well healed and the drain site is scabbed over with no drainage or reddness.    From a personal perspective, he is taking some on-line classes and lives in [place], [place] with his family.    IMPRESSION (Q67.6) Pectus excavatum  (primary encounter diagnosis)    I reviewed his CXR which showed no evidence of effusion or pneumothorax.   His mother asked about several firm bumps noted when he is in certain position, about \"2 fingers above his right nipple and to the right of his sternum\".   She said they do not feel metallic, like screws.   They are not painful.    We discussed the extensive surgery and the fact that the costochondral junctions of ribs #3, 4, 5 and 6 bilaterally were removed.   I speculated that the firm areas could be hematomas or callus formation, neither of which we need to do anything for.   There are no specific abnormalities seen on his CXR in these areas.      We discussed resuming some light exercise, slowly increasing amount of lighting but starting at 20 lbs.     PLAN  I spent a total of 25 minutes with Mr. Julio Muñiz and his mother, more than 50% of which were spent in counseling, coordination of care, and face-to-face time. I reviewed the plan as follows:  Call with any further concerns or questions  Call me once you feel a bar loosen and we will arrange for removal as an out-patient procedure.  "    All questions were answered and the patient and present family were in agreement with the plan.  I appreciate the opportunity to participate in the care of your patient and will keep you updated.  Sincerely,  ROGER Madera, CNS

## 2020-11-04 NOTE — PATIENT INSTRUCTIONS
OK to drive  OK to begin light exercise, with gradual increase in amount of weight lifting (start at 20 lb)    Call me when you feel bar(s) loosen and we will schedule you for removal, as an outpatient procedure.    Call with any concerns or questions.

## 2020-11-04 NOTE — PROGRESS NOTES
"Julio Muñiz is a 19 year old male who is being evaluated via a billable telephone visit.      The patient has been notified of following:     \"This telephone visit will be conducted via a call between you and your physician/provider. We have found that certain health care needs can be provided without the need for a physical exam.  This service lets us provide the care you need with a short phone conversation.  If a prescription is necessary we can send it directly to your pharmacy.  If lab work is needed we can place an order for that and you can then stop by our lab to have the test done at a later time.    Telephone visits are billed at different rates depending on your insurance coverage. During this emergency period, for some insurers they may be billed the same as an in-person visit.  Please reach out to your insurance provider with any questions.    If during the course of the call the physician/provider feels a telephone visit is not appropriate, you will not be charged for this service.\"    Patient has given verbal consent for Telephone visit?  Yes    What phone number would you like to be contacted at? [phone]    How would you like to obtain your AVS? MyChart     Vitals - Patient Reported  Weight (Patient Reported): 65.8 kg (145 lb)  Height (Patient Reported): 188 cm (6' 2.02\")  BMI (Based on Pt Reported Ht/Wt): 18.61  Pain Score: No Pain (0)    Floyd Saleh ADDIS    THORACIC SURGERY FOLLOW UP VISIT  Dear Dr. López,  I spoke by telephone to Mr. Julio Muñiz and his mother in follow-up today. The clinical summary follows:     PREOP DIAGNOSIS   Severe pectus excavatum  PROCEDURE   Modified Ravitch repair with retrosternal bar x2 and sternal plating with KLS Sebastien plating system  DATE OF PROCEDURE  10/9/2020    COMPLICATIONS  None    INTERVAL STUDIES  CXR yesterday:  FINDINGS:   The cardiac silhouette is normal in size. The pulmonary vasculature is  normal.  The lungs are clear. No pleural " "effusion or pneumothorax.  Metallic fixation devices from the pectus excavatum deformity repair  noted.                                                        IMPRESSION:  No acute cardiopulmonary disease.       SUBJECTIVE  I am doing well-   I take 800 mg ibuprofen twice a day.    I saw Dr. López this week and he removed my drain.    Incisions are well healed and the drain site is scabbed over with no drainage or reddness.    From a personal perspective, he is taking some on-line classes and lives in [place], [place] with his family.    IMPRESSION (Q67.6) Pectus excavatum  (primary encounter diagnosis)    I reviewed his CXR which showed no evidence of effusion or pneumothorax.   His mother asked about several firm bumps noted when he is in certain position, about \"2 fingers above his right nipple and to the right of his sternum\".   She said they do not feel metallic, like screws.   They are not painful.    We discussed the extensive surgery and the fact that the costochondral junctions of ribs #3, 4, 5 and 6 bilaterally were removed.   I speculated that the firm areas could be hematomas or callus formation, neither of which we need to do anything for.   There are no specific abnormalities seen on his CXR in these areas.      We discussed resuming some light exercise, slowly increasing amount of lighting but starting at 20 lbs.     PLAN  I spent a total of 25 minutes with Mr. Julio Muñiz and his mother, more than 50% of which were spent in counseling, coordination of care, and face-to-face time. I reviewed the plan as follows:  Call with any further concerns or questions  Call me once you feel a bar loosen and we will arrange for removal as an out-patient procedure.     All questions were answered and the patient and present family were in agreement with the plan.  I appreciate the opportunity to participate in the care of your patient and will keep you updated.  Sincerely,  ROGER Madera, CNS    "

## 2020-11-14 ENCOUNTER — HEALTH MAINTENANCE LETTER (OUTPATIENT)
Age: 20
End: 2020-11-14

## 2020-11-20 ENCOUNTER — HOSPITAL ENCOUNTER (OUTPATIENT)
Dept: PHYSICAL THERAPY | Facility: HOSPITAL | Age: 20
Setting detail: THERAPIES SERIES
End: 2020-11-20
Attending: CLINICAL NURSE SPECIALIST
Payer: COMMERCIAL

## 2020-11-20 DIAGNOSIS — Q67.6 PECTUS EXCAVATUM: ICD-10-CM

## 2020-11-20 PROCEDURE — 97110 THERAPEUTIC EXERCISES: CPT | Mod: GP | Performed by: PHYSICAL THERAPIST

## 2020-11-20 PROCEDURE — 97161 PT EVAL LOW COMPLEX 20 MIN: CPT | Mod: GP | Performed by: PHYSICAL THERAPIST

## 2020-11-20 PROCEDURE — 97140 MANUAL THERAPY 1/> REGIONS: CPT | Mod: GP | Performed by: PHYSICAL THERAPIST

## 2020-11-21 NOTE — PROGRESS NOTES
11/20/20 1500   General Information   Type of Visit Initial OP Ortho PT Evaluation   Start of Care Date 11/20/20   Referring Physician Latisha Echols APRN, CNS   Patient/Family Goals Statement regain function, less pain, improve moility   Orders Evaluate and Treat   Date of Order 11/20/20  (release date)   Certification Required? No   Medical Diagnosis s/p pes excavatum corretion, modified Ravitch procedure   Surgical/Medical history reviewed Yes   Precautions/Limitations other (see comments)  (lifting precautions 15lbs?)   Body Part(s)   Body Part(s) Cervical Spine   Presentation and Etiology   Pertinent history of current problem (include personal factors and/or comorbidities that impact the POC) Patient recently underwent pes excavatum reconstruction surgery with the modified ravitch procedure on 10/9/2020.  He reports he has overall been doing well, although he still has areas of numbness and he has noted restriction in his motion due to the 3 bars that are in place over his ribs/sternum.  He had some tightness and pain due to the drains, but that has improved. He has been working on breathing deeper as he knows he is using more of his upper chest muscles. He also has noted knots in his shoulder blades that he sometimes uses a heating pad for. He would like to learn some exercises he can do to regain his function as he continues to heal   Impairments A. Pain;D. Decreased ROM;E. Decreased flexibility;K. Numbness   Functional Limitations perform activities of daily living;perform required work activities;perform desired leisure / sports activities   Symptom Location Anterior and posterior chest/ribs/shoulders   How/Where did it occur   (From surgery on 10/9/2020)   Onset date of current episode/exacerbation 10/09/20   Chronicity New   Pain rating (0-10 point scale) Best (/10);Worst (/10)   Best (/10) 2   Worst (/10) 9   Pain quality C. Aching;G. Cramping   Frequency of pain/symptoms B. Intermittent    Pain/symptoms are: Worse during the day   Pain/symptoms exacerbated by D. Carrying;C. Lifting;E. Rest;H. Overhead reach   Pain/symptoms eased by B. Walking   Progression of symptoms since onset: Improved   Current / Previous Interventions   Diagnostic Tests:   (see chart)   Current Level of Function   Patient role/employment history A. Employed;B. Student   Employment Comments [employer] - has not worked since September   Fall Risk Screen   Fall screen completed by PT   Have you fallen 2 or more times in the past year? No   Have you fallen and had an injury in the past year? No   Is patient a fall risk? No   Cervical Spine   Observation no acute distress   Integumentary  Surgical incision visualized and is healing well with no concerning apperance   Posture slightly rounded shoulders and forward head but able to correct.  Improved pes excavatum deformity   Cervical Flexion ROM WNL   Cervical Extension ROM WNL   Cervical Right Side Bending ROM WNL   Cervical Left Side Bending ROM WNL   Cervical Right Rotation ROM WNL   Cervical Left Rotation ROM WNL   Thoracic Flexion ROM mod sanabria   Thoracic Extension ROM min sanabria   Thoracic Right Side Bending ROM min sanabria   Thoracic Left Sidebending ROM  min sanabria   Thoracic Right Rotation min sanabria   Thoracic Left Rotation min sanabria   Shoulder AROM Screen WNL except mild pain in R shoulder with reaching behind back   Shoulder/Wrist/Hand Strength Comments NT due to precautions   Upper Trapezius Flexibility hypo   Levator Scapula Flexibility hypo   Pectoralis Minor Flexibility hypo+   Cervical Flexibility Comments Hypo and increased tightness in rhomboids, lats and thoracic paraspinals   Palpation Slight discomfort with palpation of pec minor and rhomboids and lats. Still no feeling throughout much of pecs B   Planned Therapy Interventions   Planned Therapy Interventions manual therapy;neuromuscular re-education;ROM;strengthening;stretching   Planned Modality Interventions   Planned  Modality Interventions Comments as needed   Clinical Impression   Criteria for Skilled Therapeutic Interventions Met yes, treatment indicated   PT Diagnosis Pain and tissue restriction s/p pes excavatum reconstruction   Influenced by the following impairments pain, tissue restriction and weakness   Functional limitations due to impairments difficulty performing home and work tasks   Clinical Presentation Stable/Uncomplicated   Clinical Presentation Rationale due to lack of additional comorbidities that may influence antocipated PT progress   Clinical Decision Making (Complexity) Low complexity   Therapy Frequency 1 time/week   Predicted Duration of Therapy Intervention (days/wks) up to 8 weeks   Risk & Benefits of therapy have been explained Yes   Patient, Family & other staff in agreement with plan of care Yes   Clinical Impression Comments Presentation is consistent with s/p pes excavatum reconstruction that is expected to improve with skilled PT intervention   Education Assessment   Preferred Learning Style Demonstration   Barriers to Learning No barriers   ORTHO GOALS   PT Ortho Eval Goals 1;2;3   Ortho Goal 1   Goal Identifier STG 1   Goal Description Patient will be compliant with HEP in order to make progress at home   Target Date 12/12/20   Ortho Goal 2   Goal Identifier LTG 1   Goal Description Patient will report overall 40% or more improvement in function as it relates to his work and daily activities in order to return to work   Target Date 01/02/21   Ortho Goal 3   Goal Identifier LTG 2   Goal Description Patient will lift/push/pull 50lbs without pain in order to facilitte a safe return to work   Target Date 01/16/21   Total Evaluation Time   PT Eval, Low Complexity Minutes (39389) 20

## 2020-12-03 ENCOUNTER — HOSPITAL ENCOUNTER (OUTPATIENT)
Dept: PHYSICAL THERAPY | Facility: HOSPITAL | Age: 20
Setting detail: THERAPIES SERIES
End: 2020-12-03
Attending: CLINICAL NURSE SPECIALIST
Payer: COMMERCIAL

## 2020-12-03 PROCEDURE — 97110 THERAPEUTIC EXERCISES: CPT | Mod: GP | Performed by: PHYSICAL THERAPIST

## 2020-12-03 PROCEDURE — 97140 MANUAL THERAPY 1/> REGIONS: CPT | Mod: GP | Performed by: PHYSICAL THERAPIST

## 2020-12-10 ENCOUNTER — HOSPITAL ENCOUNTER (EMERGENCY)
Facility: HOSPITAL | Age: 20
Discharge: HOME | End: 2020-12-10
Attending: EMERGENCY MEDICINE | Admitting: EMERGENCY MEDICINE
Payer: COMMERCIAL

## 2020-12-10 ENCOUNTER — APPOINTMENT (OUTPATIENT)
Dept: GENERAL RADIOLOGY | Facility: HOSPITAL | Age: 20
End: 2020-12-10
Attending: EMERGENCY MEDICINE
Payer: COMMERCIAL

## 2020-12-10 ENCOUNTER — HOSPITAL ENCOUNTER (OUTPATIENT)
Dept: PHYSICAL THERAPY | Facility: HOSPITAL | Age: 20
Setting detail: THERAPIES SERIES
End: 2020-12-10
Attending: CLINICAL NURSE SPECIALIST
Payer: COMMERCIAL

## 2020-12-10 VITALS
OXYGEN SATURATION: 96 % | RESPIRATION RATE: 18 BRPM | SYSTOLIC BLOOD PRESSURE: 134 MMHG | BODY MASS INDEX: 19.18 KG/M2 | WEIGHT: 149.36 LBS | DIASTOLIC BLOOD PRESSURE: 75 MMHG | HEART RATE: 77 BPM | TEMPERATURE: 98 F
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
## 2020-12-10 DIAGNOSIS — Q67.6 PECTUS EXCAVATUM: ICD-10-CM

## 2020-12-10 PROCEDURE — 97110 THERAPEUTIC EXERCISES: CPT | Mod: GP

## 2020-12-10 PROCEDURE — 99283 EMERGENCY DEPT VISIT LOW MDM: CPT | Performed by: EMERGENCY MEDICINE

## 2020-12-10 PROCEDURE — 99283 EMERGENCY DEPT VISIT LOW MDM: CPT | Mod: 25

## 2020-12-10 PROCEDURE — 71046 X-RAY EXAM CHEST 2 VIEWS: CPT

## 2020-12-10 PROCEDURE — 97140 MANUAL THERAPY 1/> REGIONS: CPT | Mod: GP

## 2020-12-10 PROCEDURE — 99283 EMERGENCY DEPT VISIT LOW MDM: CPT

## 2020-12-10 ASSESSMENT — ENCOUNTER SYMPTOMS
FEVER: 0
ABDOMINAL PAIN: 0
SHORTNESS OF BREATH: 0
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
## 2020-12-10 NOTE — ED AVS SNAPSHOT
[facility]  [address]  [place] [address]  Phone: [phone]                                    Julio Muñiz   MRN: [id number]    Department: [facility]   Date of Visit: 12/10/2020           After Visit Summary Signature Page    I have received my discharge instructions, and my questions have been answered. I have discussed any challenges I see with this plan with the nurse or doctor.    ..........................................................................................................................................  Patient/Patient Representative Signature      ..........................................................................................................................................  Patient Representative Print Name and Relationship to Patient    ..................................................               ................................................  Date                                   Time    ..........................................................................................................................................  Reviewed by Signature/Title    ...................................................              ..............................................  Date                                               Time          22EPIC Rev 08/18
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
## 2020-12-17 ENCOUNTER — HOSPITAL ENCOUNTER (OUTPATIENT)
Dept: PHYSICAL THERAPY | Facility: HOSPITAL | Age: 20
Setting detail: THERAPIES SERIES
End: 2020-12-17
Attending: CLINICAL NURSE SPECIALIST
Payer: COMMERCIAL

## 2020-12-17 PROCEDURE — 97110 THERAPEUTIC EXERCISES: CPT | Mod: GP

## 2020-12-17 PROCEDURE — 97140 MANUAL THERAPY 1/> REGIONS: CPT | Mod: GP

## 2020-12-22 ENCOUNTER — HOSPITAL ENCOUNTER (OUTPATIENT)
Dept: PHYSICAL THERAPY | Facility: HOSPITAL | Age: 20
Setting detail: THERAPIES SERIES
End: 2020-12-22
Attending: CLINICAL NURSE SPECIALIST
Payer: COMMERCIAL

## 2020-12-22 PROCEDURE — 97110 THERAPEUTIC EXERCISES: CPT | Mod: GP | Performed by: PHYSICAL THERAPIST

## 2020-12-22 PROCEDURE — 97140 MANUAL THERAPY 1/> REGIONS: CPT | Mod: GP | Performed by: PHYSICAL THERAPIST

## 2020-12-29 ENCOUNTER — HOSPITAL ENCOUNTER (OUTPATIENT)
Dept: PHYSICAL THERAPY | Facility: HOSPITAL | Age: 20
Setting detail: THERAPIES SERIES
End: 2020-12-29
Attending: CLINICAL NURSE SPECIALIST
Payer: COMMERCIAL

## 2020-12-29 DIAGNOSIS — Q67.6 PECTUS EXCAVATUM: Primary | ICD-10-CM

## 2020-12-29 PROCEDURE — 97110 THERAPEUTIC EXERCISES: CPT | Mod: GP

## 2020-12-29 PROCEDURE — 97140 MANUAL THERAPY 1/> REGIONS: CPT | Mod: GP

## 2020-12-30 ENCOUNTER — TELEPHONE (OUTPATIENT)
Dept: SURGERY | Facility: CLINIC | Age: 20
End: 2020-12-30

## 2020-12-30 ENCOUNTER — HOSPITAL ENCOUNTER (OUTPATIENT)
Dept: GENERAL RADIOLOGY | Facility: HOSPITAL | Age: 20
Discharge: HOME | End: 2020-12-30
Attending: CLINICAL NURSE SPECIALIST | Admitting: CLINICAL NURSE SPECIALIST
Payer: COMMERCIAL

## 2020-12-30 DIAGNOSIS — Q67.6 PECTUS EXCAVATUM: ICD-10-CM

## 2020-12-30 PROCEDURE — 71046 X-RAY EXAM CHEST 2 VIEWS: CPT

## 2020-12-30 NOTE — TELEPHONE ENCOUNTER
I called Junior to let him know his CXR results show the hardware from his pectus procedure is in good position with no change from previous imaging.     He has some right chest pain where the bar is, takes ibuprofen and heat for relief.    I asked him to call us again if the hardware loosens and we will then schedule the removal.   He appreciated my call.

## 2021-01-05 ENCOUNTER — HOSPITAL ENCOUNTER (OUTPATIENT)
Dept: PHYSICAL THERAPY | Facility: HOSPITAL | Age: 21
Setting detail: THERAPIES SERIES
End: 2021-01-05
Attending: CLINICAL NURSE SPECIALIST
Payer: COMMERCIAL

## 2021-01-05 PROCEDURE — 97110 THERAPEUTIC EXERCISES: CPT | Mod: GP | Performed by: PHYSICAL THERAPIST

## 2021-01-05 PROCEDURE — 97140 MANUAL THERAPY 1/> REGIONS: CPT | Mod: GP | Performed by: PHYSICAL THERAPIST

## 2021-01-14 ENCOUNTER — HOSPITAL ENCOUNTER (OUTPATIENT)
Dept: PHYSICAL THERAPY | Facility: HOSPITAL | Age: 21
Setting detail: THERAPIES SERIES
End: 2021-01-14
Attending: CLINICAL NURSE SPECIALIST
Payer: COMMERCIAL

## 2021-01-14 PROCEDURE — 97110 THERAPEUTIC EXERCISES: CPT | Mod: GP | Performed by: PHYSICAL THERAPIST

## 2021-01-21 ENCOUNTER — HOSPITAL ENCOUNTER (OUTPATIENT)
Dept: PHYSICAL THERAPY | Facility: HOSPITAL | Age: 21
Setting detail: THERAPIES SERIES
End: 2021-01-21
Attending: CLINICAL NURSE SPECIALIST
Payer: COMMERCIAL

## 2021-01-21 PROCEDURE — 97110 THERAPEUTIC EXERCISES: CPT | Mod: GP

## 2021-02-04 ENCOUNTER — HOSPITAL ENCOUNTER (OUTPATIENT)
Dept: PHYSICAL THERAPY | Facility: HOSPITAL | Age: 21
Setting detail: THERAPIES SERIES
End: 2021-02-04
Attending: CLINICAL NURSE SPECIALIST
Payer: COMMERCIAL

## 2021-02-04 PROCEDURE — 97110 THERAPEUTIC EXERCISES: CPT | Mod: GP

## 2021-02-18 ENCOUNTER — HOSPITAL ENCOUNTER (OUTPATIENT)
Dept: PHYSICAL THERAPY | Facility: HOSPITAL | Age: 21
Setting detail: THERAPIES SERIES
End: 2021-02-18
Attending: CLINICAL NURSE SPECIALIST
Payer: COMMERCIAL

## 2021-02-18 PROCEDURE — 97140 MANUAL THERAPY 1/> REGIONS: CPT | Mod: GP | Performed by: PHYSICAL THERAPIST

## 2021-02-18 PROCEDURE — 97110 THERAPEUTIC EXERCISES: CPT | Mod: GP | Performed by: PHYSICAL THERAPIST

## 2021-02-18 NOTE — PROGRESS NOTES
Outpatient Physical Therapy Discharge Note     Patient: Julio Muñiz  : 2000    Beginning/End Dates of Reporting Period:  2020 to 2021    Referring Provider: Dr Alvarez    Therapy Diagnosis: s/p pectus excavatum correction     Client Self Report: Patient reports that he continues to feel about 85% improved.  He recently accepted a new position in [place], [place] and will be moving there soon for his new position. He feels he is doing very well and know his limitations. He will continue to carry out his home program and will touch base with this writer if he has additional concerns. We will discharge him from PT at this time.    Objective Measurements:  Objective Measure: Home program  Details: Patient has a comprehensive home program focusing on anterior chest stretching, posteriot back strengthening and posture. He feels comfortable with these exercises and will continue them IND       Goals:  Goal Identifier STG 1   Goal Description Patient will be compliant with HEP in order to make progress at home   Target Date 20   Date Met  20   Progress:     Goal Identifier LTG 1   Goal Description Patient will report overall 40% or more improvement in function as it relates to his work and daily activities in order to return to work   Target Date 21   Date Met  21   Progress:     Goal Identifier LTG 2   Goal Description Patient will lift/push/pull 50lbs without pain in order to facilitte a safe return to work   Target Date 21   Date Met  21   Progress:       Progress Toward Goals:   Progress this reporting period: patient has met goals      Plan:  Discharge from therapy.    Discharge:    Reason for Discharge: Patient has met all goals.    Equipment Issued: None    Discharge Plan: Patient to continue home program.

## 2021-02-25 ENCOUNTER — APPOINTMENT (OUTPATIENT)
Dept: OCCUPATIONAL MEDICINE | Facility: CLINIC | Age: 21
End: 2021-02-25

## 2021-02-25 PROCEDURE — 99000 SPECIMEN HANDLING OFFICE-LAB: CPT | Performed by: FAMILY MEDICINE

## 2021-05-06 ENCOUNTER — APPOINTMENT (OUTPATIENT)
Dept: GENERAL RADIOLOGY | Facility: CLINIC | Age: 21
End: 2021-05-06
Attending: EMERGENCY MEDICINE
Payer: COMMERCIAL

## 2021-05-06 DIAGNOSIS — Q67.6 PECTUS EXCAVATUM: Primary | ICD-10-CM

## 2021-05-06 PROCEDURE — 99284 EMERGENCY DEPT VISIT MOD MDM: CPT | Performed by: EMERGENCY MEDICINE

## 2021-05-06 PROCEDURE — 99284 EMERGENCY DEPT VISIT MOD MDM: CPT | Mod: 25 | Performed by: EMERGENCY MEDICINE

## 2021-05-06 PROCEDURE — 71046 X-RAY EXAM CHEST 2 VIEWS: CPT

## 2021-05-06 PROCEDURE — 71046 X-RAY EXAM CHEST 2 VIEWS: CPT | Mod: 26 | Performed by: RADIOLOGY

## 2021-05-06 RX ORDER — OXYCODONE HYDROCHLORIDE 5 MG/1
TABLET ORAL
COMMUNITY
Start: 2020-10-16 | End: 2021-05-26

## 2021-05-06 ASSESSMENT — MIFFLIN-ST. JEOR: SCORE: 1750.15

## 2021-05-07 ENCOUNTER — HOSPITAL ENCOUNTER (EMERGENCY)
Facility: CLINIC | Age: 21
Discharge: HOME | End: 2021-05-07
Attending: EMERGENCY MEDICINE | Admitting: EMERGENCY MEDICINE
Payer: COMMERCIAL

## 2021-05-07 ENCOUNTER — TELEPHONE (OUTPATIENT)
Dept: SURGERY | Facility: CLINIC | Age: 21
End: 2021-05-07

## 2021-05-07 ENCOUNTER — PREP FOR PROCEDURE (OUTPATIENT)
Dept: SURGERY | Facility: CLINIC | Age: 21
End: 2021-05-07

## 2021-05-07 VITALS
HEART RATE: 89 BPM | RESPIRATION RATE: 18 BRPM | HEIGHT: 74 IN | DIASTOLIC BLOOD PRESSURE: 65 MMHG | BODY MASS INDEX: 19.25 KG/M2 | OXYGEN SATURATION: 97 % | TEMPERATURE: 97.6 F | WEIGHT: 150 LBS | SYSTOLIC BLOOD PRESSURE: 132 MMHG

## 2021-05-07 DIAGNOSIS — R07.89 CHEST WALL PAIN: ICD-10-CM

## 2021-05-07 DIAGNOSIS — Q67.6 PECTUS EXCAVATUM: Primary | ICD-10-CM

## 2021-05-07 PROCEDURE — 250N000013 HC RX MED GY IP 250 OP 250 PS 637: Performed by: EMERGENCY MEDICINE

## 2021-05-07 PROCEDURE — 250N000011 HC RX IP 250 OP 636: Performed by: EMERGENCY MEDICINE

## 2021-05-07 PROCEDURE — 96372 THER/PROPH/DIAG INJ SC/IM: CPT | Performed by: EMERGENCY MEDICINE

## 2021-05-07 RX ORDER — OXYCODONE HYDROCHLORIDE 10 MG/1
10 TABLET ORAL ONCE
Status: COMPLETED | OUTPATIENT
Start: 2021-05-07 | End: 2021-05-07

## 2021-05-07 RX ORDER — CEFAZOLIN SODIUM 2 G/50ML
2 SOLUTION INTRAVENOUS SEE ADMIN INSTRUCTIONS
Status: CANCELLED | OUTPATIENT
Start: 2021-05-07

## 2021-05-07 RX ORDER — CEFAZOLIN SODIUM 2 G/50ML
2 SOLUTION INTRAVENOUS
Status: CANCELLED | OUTPATIENT
Start: 2021-05-07

## 2021-05-07 RX ORDER — OXYCODONE HYDROCHLORIDE 10 MG/1
10 TABLET ORAL EVERY 6 HOURS PRN
Qty: 10 TABLET | Refills: 0 | Status: SHIPPED | OUTPATIENT
Start: 2021-05-07 | End: 2021-05-26

## 2021-05-07 RX ORDER — KETOROLAC TROMETHAMINE 30 MG/ML
30 INJECTION, SOLUTION INTRAMUSCULAR; INTRAVENOUS ONCE
Status: COMPLETED | OUTPATIENT
Start: 2021-05-07 | End: 2021-05-07

## 2021-05-07 RX ADMIN — OXYCODONE HYDROCHLORIDE 10 MG: 10 TABLET ORAL at 01:05

## 2021-05-07 RX ADMIN — KETOROLAC TROMETHAMINE 30 MG: 30 INJECTION, SOLUTION INTRAMUSCULAR at 01:38

## 2021-05-07 ASSESSMENT — ENCOUNTER SYMPTOMS
WOUND: 0
FEVER: 0
COUGH: 0

## 2021-05-07 NOTE — ED TRIAGE NOTES
Pt reports pain in his chest for about 2 weeks. Pt denies any injury prior to the pain starting. Pt describes the pain as across his chest in the area of a bar that was placed in a RABITCH procedure. Pt reports difficulty taking a full breath with the pain.

## 2021-05-07 NOTE — DISCHARGE INSTRUCTIONS
Please call your thoracic surgeon tomorrow to set up a follow-up appointment.  Return to the emergency department as needed.

## 2021-05-07 NOTE — PROGRESS NOTES
I called Junior and left a VM telling him that Dr. Alvarez will be arranging for a procedure to remove the hardware.   I told him he will need a pre-op H & P done at his local clinic, good for 30 days, and once he has a date, will need a Covid test prior.   I left my direct # if questions and sent a message to our OR  to help arrange this.

## 2021-05-07 NOTE — TELEPHONE ENCOUNTER
I spoke to patients' mother, Tatyana, to let her know we would be scheduling Junior for hardware removal in the next few weeks.  I told her I had left  for Junior, as well, and gave him my direct # to call, if questions.

## 2021-05-07 NOTE — ED PROVIDER NOTES
[facility] ([facility])  5/06/21  History     Chief Complaint   Patient presents with     Chest Pain     The history is provided by the patient and medical records.     Julio Muñiz is a 20 year old male with a history notable for s/p pectus excavatum repair (10/9/2020) who presents to the ED with his mother for evaluation of chest pain.  He notes experiencing worsening chest pain over the past 2 weeks which worsens with deep inhalations and palpation to his chest.  He currently rates his pain as an 8/10.  Patient took oxycodone 5 mg at 7:15 PM which improved the pain for a short period of time.  He has also taken ibuprofen with slight relief.  The patient is also noticed popping sensations in his chest.  Patient was told he was to have the polyps removed approximately 6 months after procedure and that there may be some discomfort around that time.  He has not been in contact with the surgeon lately.  He denies rashes or sores on his chest.  No recent fever, cough or illness.    I have reviewed the Medications, Allergies, Past Medical and Surgical History, and Social History in the [employer] system.  PAST MEDICAL HISTORY: History reviewed. No pertinent past medical history.    PAST SURGICAL HISTORY:   Past Surgical History:   Procedure Laterality Date     REPAIR PECTUS EXCAVATUM N/A 10/9/2020    Procedure: Modified Ravitch Repair with sternal plating;  Surgeon: Maya Anaya MD;  Location: [facility] OR       Past medical history, past surgical history, medications, and allergies were reviewed with the patient. Additional pertinent items: None    FAMILY HISTORY: No family history on file.    SOCIAL HISTORY:   Social History     Tobacco Use     Smoking status: Never Smoker     Smokeless tobacco: Never Used   Substance Use Topics     Alcohol use: Not Currently     Comment: none over one month     Social history was reviewed with the patient. Additional pertinent items:  "None      Discharge Medication List as of 5/7/2021  1:38 AM      START taking these medications    Details   !! oxyCODONE (ROXICODONE) 10 MG tablet Take 1 tablet (10 mg) by mouth every 6 hours as needed for pain, Disp-10 tablet, R-0, Local Print       !! - Potential duplicate medications found. Please discuss with provider.      CONTINUE these medications which have NOT CHANGED    Details   acetaminophen (TYLENOL) 325 MG tablet Take 3 tablets (975 mg) by mouth every 6 hours, OTC      ibuprofen (ADVIL/MOTRIN) 600 MG tablet Take 1 tablet (600 mg) by mouth every 6 hours, OTC      !! oxyCODONE (ROXICODONE) 5 MG tablet TAKE 1 TABLET BY MOUTH EVERY 4 HOURS AS NEEDED FOR PAIN, Historical       !! - Potential duplicate medications found. Please discuss with provider.           No Known Allergies     Review of Systems   Constitutional: Negative for fever.   Respiratory: Negative for cough.    Cardiovascular: Positive for chest pain.   Skin: Negative for rash and wound.     A complete review of systems was performed with pertinent positives and negatives noted in the HPI, and all other systems negative.    Physical Exam   BP: 132/65  Pulse: 96  Temp: 97.6  F (36.4  C)  Resp: 20  Height: 188 cm (6' 2\")  Weight: 68 kg (150 lb)  SpO2: 96 %      Physical Exam  Constitutional:       General: He is not in acute distress.     Appearance: He is not diaphoretic.   HENT:      Head: Normocephalic.      Mouth/Throat:      Pharynx: No oropharyngeal exudate.   Eyes:      Extraocular Movements: Extraocular movements intact.   Neck:      Musculoskeletal: Neck supple.   Cardiovascular:      Heart sounds: Normal heart sounds.   Pulmonary:      Effort: No respiratory distress.      Breath sounds: Normal breath sounds.   Abdominal:      General: There is no distension.   Musculoskeletal:         General: No deformity.   Skin:     General: Skin is dry.   Neurological:      Mental Status: He is alert.      Comments: alert   Psychiatric:         " Behavior: Behavior normal.         ED Course   1:00 AM  The patient was seen and examined by Dr. Richard Ho in Room [facility].   ED Course as of May 07 0205   Fri May 07, 2021   0133 XR Chest 2 Views     Procedures        Results for orders placed or performed during the hospital encounter of 05/07/21   XR Chest 2 Views     Status: None    Narrative    EXAM: XR CHEST 2 VW  LOCATION: [facility]  DATE/TIME: 5/6/2021 10:43 PM    INDICATION: Chest pain  COMPARISON: 12/30/2020      Impression    IMPRESSION: Stable cardiomediastinal silhouette. No focal consolidation or pleural effusion. Postsurgical change pectus excavatum repair. Stable chest.       Results for orders placed or performed during the hospital encounter of 05/07/21 (from the past 24 hour(s))   XR Chest 2 Views    Narrative    EXAM: XR CHEST 2 VW  LOCATION: [facility]  DATE/TIME: 5/6/2021 10:43 PM    INDICATION: Chest pain  COMPARISON: 12/30/2020      Impression    IMPRESSION: Stable cardiomediastinal silhouette. No focal consolidation or pleural effusion. Postsurgical change pectus excavatum repair. Stable chest.     Medications   oxyCODONE IR (ROXICODONE) tablet 10 mg (10 mg Oral Given 5/7/21 0105)   ketorolac (TORADOL) injection 30 mg (30 mg Intramuscular Given 5/7/21 0138)             Assessments & Plan (with Medical Decision Making)   20-year-old male presents to us with a chief complaint of chest wall pain.  He has previously had a surgical procedure to help correct his pectus excavated.  He was informed that approximately 6 months after the procedure he is going to start having some degree of pain as the bars loosened.  He does not have any respiratory symptoms such as cough or fever right now chest x-ray is unremarkable for any changes.  His presentation here seems consistent with the expected clinical course.  We will give him a stronger dose of pain medications and send him home with oxycodone.  Did discuss with  thoracic surgery resident who felt it would be appropriate for the patient to call the office in the morning for follow-up.  The patient is reassured and understands.  Pain is improved at this point.  They will follow up with her surgeon as directed.  I have reviewed the nursing notes.    I have reviewed the findings, diagnosis, plan and need for follow up with the patient.    Discharge Medication List as of 5/7/2021  1:38 AM      START taking these medications    Details   !! oxyCODONE (ROXICODONE) 10 MG tablet Take 1 tablet (10 mg) by mouth every 6 hours as needed for pain, Disp-10 tablet, R-0, Local Print       !! - Potential duplicate medications found. Please discuss with provider.          Final diagnoses:   Chest wall pain     IInderjit, am serving as a trained medical scribe to document services personally performed by Richard Ho DO, based on the provider's statements to me.      Richard MARC DO, was physically present and have reviewed and verified the accuracy of this note documented by Inderjit Hernandez.    5/6/2021   [facility] EMERGENCY DEPARTMENT     Richard Ho DO  05/07/21 0206

## 2021-05-11 ENCOUNTER — TELEPHONE (OUTPATIENT)
Dept: SURGERY | Facility: CLINIC | Age: 21
End: 2021-05-11

## 2021-05-11 NOTE — TELEPHONE ENCOUNTER
Called patient to schedule procedure with Dr. Maya Ospina, there was no answer.  Left message with my direct line [phone].

## 2021-05-11 NOTE — TELEPHONE ENCOUNTER
Spoke with patient to schedule procedure with Dr. Maya Ospina   Procedure was scheduled on 05/26 at [facility]  Patient will have H&P with PAC     Patient is aware a COVID-19 test is needed before their procedure. The test should be with-in 4 days of their procedure.   Test Details: Date 05/25 Location [facility]    Patient is aware a / is needed day of surgery.   Surgery letter was sent via [employer], patient has my direct contact information for any further questions.

## 2021-05-12 DIAGNOSIS — Z11.59 ENCOUNTER FOR SCREENING FOR OTHER VIRAL DISEASES: ICD-10-CM

## 2021-05-12 PROBLEM — Q67.6 PECTUS EXCAVATUM: Status: ACTIVE | Noted: 2020-09-03

## 2021-05-12 NOTE — TELEPHONE ENCOUNTER
FUTURE VISIT INFORMATION      SURGERY INFORMATION:    Date: 05/26 Dr. Alvarez     RECORDS REQUESTED FROM:       Primary Care Provider: Reese Kothari MD- [facility]    Most recent EKG+ Tracing: 10/9/20    Most recent Cardiac Stress Test: 8/18/20

## 2021-05-21 ENCOUNTER — VIRTUAL VISIT (OUTPATIENT)
Dept: SURGERY | Facility: CLINIC | Age: 21
End: 2021-05-21
Payer: COMMERCIAL

## 2021-05-21 ENCOUNTER — ANESTHESIA EVENT (OUTPATIENT)
Dept: SURGERY | Facility: AMBULATORY SURGERY CENTER | Age: 21
End: 2021-05-21
Payer: COMMERCIAL

## 2021-05-21 ENCOUNTER — PRE VISIT (OUTPATIENT)
Dept: SURGERY | Facility: CLINIC | Age: 21
End: 2021-05-21

## 2021-05-21 DIAGNOSIS — Z01.818 PREOP EXAMINATION: Primary | ICD-10-CM

## 2021-05-21 PROCEDURE — 99203 OFFICE O/P NEW LOW 30 MIN: CPT | Mod: 95 | Performed by: CLINICAL NURSE SPECIALIST

## 2021-05-21 ASSESSMENT — PAIN SCALES - GENERAL: PAINLEVEL: NO PAIN (0)

## 2021-05-21 ASSESSMENT — LIFESTYLE VARIABLES: TOBACCO_USE: 0

## 2021-05-21 NOTE — H&P
Pre-Operative H & P     CC:  Preoperative exam to assess for increased cardiopulmonary risk while undergoing surgery and anesthesia.    Date of Encounter: 2021  Primary Care Physician:  Reese Kothari  Reason for visit: Pectus excavatum [Q67.6]  HPI  Julio Muñiz is a 20 year old male who presents for pre-operative H & P in preparation for Removal of hardware after pectus excavatum repair with Dr. Antonio Ospina on 21 at [facility]. History is obtained from the patient and medical records.    At the beginning of this visit patient ID was confirmed using name and .     Video-Visit Details    Type of service:  Video Visit    Patient verbally consented to video service today: YES      Video Start Time: 3:54pm  Video End Time (time video stopped): 4:05pm    Originating Location (pt. Location): Home    Distant Location (provider location):  [facility] PREOPER[facility]    Mode of Communication:  Video Conference via [employer]    Patient with history of repair of pectus excavatum with hardware on 10/9/20 by Dr. Antonio Ospina, who recently presented to the ED for evaluation of chest pain, worsening over the past two weeks, especially with deep inhalations and palpation to his chest. He had also noticed a popping sensation. The Thoracic team was notified, and patient and family have been counseled for above procedure.    He is otherwise healthy. Today patient denies fever, cough, or irregular HR. He continues to have some pain in his chest especially when he takes a deep breath. He is taking oxycodone rarely, and typically takes Tylenol or Ibuprofen.     Past Medical History  Past Medical History:   Diagnosis Date     Pectus excavatum        Past Surgical History  Past Surgical History:   Procedure Laterality Date     ADENOIDECTOMY       MYRINGOTOMY BILATERAL       REPAIR PECTUS EXCAVATUM N/A 10/09/2020    Procedure: Modified Ravitch Repair with sternal plating;   Surgeon: Maya Anaya MD;  Location: [facility] OR     TYMPANOPLASTY      harvest facial graft       Hx of Blood transfusions/reactions: Denies.     Hx of abnormal bleeding or anti-platelet use: Denies.     Menstrual history: No LMP for male patient.    Steroid use in the last year: Denies.     Personal or FH with difficulty with Anesthesia:  Denies. He feels like it took him a long time to wake up and feel like his normal self after last surgery.    Prior to Admission Medications  Current Outpatient Medications   Medication Sig Dispense Refill     acetaminophen (TYLENOL) 325 MG tablet Take 3 tablets (975 mg) by mouth every 6 hours       ibuprofen (ADVIL/MOTRIN) 600 MG tablet Take 1 tablet (600 mg) by mouth every 6 hours (Patient taking differently: Take 600 mg by mouth every 6 hours )       oxyCODONE (ROXICODONE) 5 MG tablet TAKE 1 TABLET BY MOUTH EVERY 4 HOURS AS NEEDED FOR PAIN       oxyCODONE (ROXICODONE) 10 MG tablet Take 1 tablet (10 mg) by mouth every 6 hours as needed for pain (Patient not taking: Reported on 5/21/2021) 10 tablet 0       Allergies  No Known Allergies    Social History  Social History     Socioeconomic History     Marital status: Single     Spouse name: Not on file     Number of children: Not on file     Years of education: Not on file     Highest education level: Not on file   Occupational History     Not on file   Social Needs     Financial resource strain: Not on file     Food insecurity     Worry: Not on file     Inability: Not on file     Transportation needs     Medical: Not on file     Non-medical: Not on file   Tobacco Use     Smoking status: Never Smoker     Smokeless tobacco: Never Used   Substance and Sexual Activity     Alcohol use: Not Currently     Comment: none over one month     Drug use: No     Sexual activity: Not on file   Lifestyle     Physical activity     Days per week: Not on file     Minutes per session: Not on file     Stress: Not on file   Relationships     Social  connections     Talks on phone: Not on file     Gets together: Not on file     Attends Jewish service: Not on file     Active member of club or organization: Not on file     Attends meetings of clubs or organizations: Not on file     Relationship status: Not on file     Intimate partner violence     Fear of current or ex partner: Not on file     Emotionally abused: Not on file     Physically abused: Not on file     Forced sexual activity: Not on file   Other Topics Concern     Not on file   Social History Narrative     Not on file       Family History  Family History   Problem Relation Age of Onset     Asthma Mother      ROS/MED HISTORY  The complete review of systems is negative other than noted in the HPI or here.    ENT/Pulmonary:  - neg pulmonary ROS     Neurologic:  - neg neurologic ROS     Cardiovascular:  - neg cardiovascular ROS   (+) -----Previous cardiac testing   Echo: Date: Results:    Stress Test: Date: 8//18/20 Results:    ECG Reviewed: Date: 9/17/20 Results:  SB with occ ectopic premature beats, poss LAE, inc RBBB, moderate ST depression  Cath: Date: Results:      METS/Exercise Tolerance: >4 METS    Hematologic:  - neg hematologic  ROS     Musculoskeletal:  - neg musculoskeletal ROS     GI/Hepatic:  - neg GI/hepatic ROS     Renal/Genitourinary:  - neg Renal ROS     Endo:  - neg endo ROS     Psychiatric/Substance Use:  - neg psychiatric ROS     Infectious Disease:  - neg infectious disease ROS     Malignancy:  - neg malignancy ROS     Other:  - neg other ROS          LABS: Personally reviewed  CBC:   Lab Results   Component Value Date    WBC 9.5 10/11/2020    WBC 11.8 (H) 10/10/2020    HGB 13.7 10/11/2020    HGB 13.7 10/10/2020    HCT 42.3 10/11/2020    HCT 40.7 10/10/2020     10/11/2020     10/10/2020     BMP:   Lab Results   Component Value Date     10/11/2020     10/10/2020    POTASSIUM 3.9 10/11/2020    POTASSIUM 3.9 10/10/2020    CHLORIDE 105 10/11/2020    CHLORIDE 107  10/10/2020    CO2 30 10/11/2020    CO2 27 10/10/2020    BUN 10 10/11/2020    BUN 14 10/10/2020    CR 1.08 10/11/2020    CR 1.03 10/10/2020     (H) 10/11/2020     (H) 10/10/2020     COAGS:   Lab Results   Component Value Date    INR 1.0 09/17/2020     POC:   Lab Results   Component Value Date    BGM 85 10/09/2020     OTHER:   Lab Results   Component Value Date    WANDA 8.8 10/11/2020    MAG 1.9 10/10/2020       Physical Exam  Constitutional: Awake, alert, no apparent distress, and appears stated age.  Respiratory: No cough or obvious dyspnea.  Neuropsychiatric: Calm, cooperative. Normal affect.     Please refer to the physical examination documented by the anesthesiologist in the anesthesia record on the day of surgery    EKG: Personally reviewed 9/17/20 Sinus bradycardia, with occasional premature beats, possible left atrial enlargement, incomplete RBBB, moderate ST depression.   Stress test: Bicycle stress test 8/18/20  Impression: Slightly abnormal resting EKG.  Maximal bicycle stress echo.  No evidence for exercise-induced dysrhythmia.  No evidence for exercise-induced myocardial ischemia, Definity enhanced echo images pending.    Stress echo 8/18/20  Interpretation Summary   Normal, low-risk exercise echocardiogram without evidence of ischemia.   The target heart rate was achieved. Normal heart rate and BP response to exercise.   Normal biventricular size, thickness, and global systolic function at baseline, LVEF=55-60%.   With exercise, LVEF increased to >65% and LV cavity size decreased appropriately.   No regional wall motion abnormalities are present at rest or with exercise.   Exercise ECG portion of the test (including functional capacity) is reported separately.   The mitral leaflets appear thickened and myxomatous.There is mitral valve prolapse but no significant mitral regurgitation. The chordae appear redundant.   The tricuspid leaflets appear thickened and myxomatous. There is tricuspid  valve prolapse with mild tricuspid regurgitation.   Normal aortic root and proximal ascending aorta, measuring 2.8 cm (1.4 cm/m2) and 2.1 cm (1.1 cm/m2) respectively.     5/7/21 CXR                                                        IMPRESSION: Stable cardiomediastinal silhouette. No focal consolidation or pleural effusion. Postsurgical change pectus excavatum repair. Stable chest.    Imaging and cardiac testing reviewed by this provider      Outside records reviewed from: Care Everywhere    ASSESSMENT and PLAN  Julio Muñiz is a 20 year old male scheduled to undergo Removal of hardware after pectus excavatum repair with Dr. Antonio Ospina on 5/26/21. He has the following specific operative considerations:   - RCRI : No serious cardiac risks.   - Anesthesia considerations:  Refer to PAC assessment in anesthesia records  - VTE risk: 0.5%  - OSMEL # of risks 1/8 = Low risk  - Risk of PONV score = 2.  If > 2, anti-emetic intervention recommended.    --Recent chest discomfort after repair of pectus excavatum with hardware. Above procedure planned with removal of hardware with MAC and local. Patient comfortable with plan.  --No history of problems with anesthesia. Feels like it took him a long time to get back to normal after last anesthesia. Patient doesn't like needles or blood.  --Generally healthy with no significant cardiopulmonary history. Nonsmoker. Good activity tolerance. Stress echocardiogram above LVEF=55-60%. The mitral leaflets appear thickened and myxomatous.There is mitral valve prolapse but no significant mitral regurgitation. The chordae appear redundant. The tricuspid leaflets appear thickened and myxomatous. There is tricuspid valve prolapse with mild tricuspid regurgitation.     Arrival time, NPO, shower and medication instructions provided by nursing staff today.       Patient was discussed with Dr Freeman. Patient is optimized and is acceptable candidate for the proposed procedure.  No  further diagnostic evaluation is needed.       ROGER Paulino CNS  Preoperative Assessment Center  [facility]  Phone: [phone]  Fax: [phone]

## 2021-05-21 NOTE — ANESTHESIA PREPROCEDURE EVALUATION
Anesthesia Pre-Procedure Evaluation    Patient: Julio Muñiz   MRN: [id number] : 2000        Preoperative Diagnosis: Pectus excavatum [Q67.6]   Procedure : Procedure(s):  Removal of hardware after pectus excavatum repair     Past Medical History:   Diagnosis Date     Pectus excavatum       Past Surgical History:   Procedure Laterality Date     ADENOIDECTOMY       MYRINGOTOMY BILATERAL       REPAIR PECTUS EXCAVATUM N/A 10/09/2020    Procedure: Modified Ravitch Repair with sternal plating;  Surgeon: Maya Anaya MD;  Location: [facility] OR     TYMPANOPLASTY      harvest facial graft      No Known Allergies   Social History     Tobacco Use     Smoking status: Never Smoker     Smokeless tobacco: Never Used   Substance Use Topics     Alcohol use: Not Currently     Comment: none over one month      Wt Readings from Last 1 Encounters:   21 68 kg (150 lb)        Anesthesia Evaluation   Pt has had prior anesthetic. Type: General (After last anesthesia he felt that it took him ~ 1.5 days to return to normal.).    No history of anesthetic complications       ROS/MED HX  ENT/Pulmonary: Comment: History of pectus excavatum repair with hardware. Now having pain and it hurts when he takes a deep breath.   (-) tobacco use   Neurologic:  - neg neurologic ROS     Cardiovascular:  - neg cardiovascular ROS   (+) -----Previous cardiac testing   Echo: Date: Results:    Stress Test: Date: 20 Results:    ECG Reviewed: Date: 20 Results:  SB with occ ectopic premature beats, poss LAE, inc RBBB, moderate ST depression  Cath: Date: Results:   (-) taking anticoagulants/antiplatelets   METS/Exercise Tolerance: >4 METS    Hematologic:  - neg hematologic  ROS     Musculoskeletal:  - neg musculoskeletal ROS     GI/Hepatic:  - neg GI/hepatic ROS     Renal/Genitourinary:  - neg Renal ROS     Endo:  - neg endo ROS     Psychiatric/Substance Use:  - neg psychiatric ROS     Infectious Disease:  - neg infectious disease  ROS     Malignancy:  - neg malignancy ROS     Other:  - neg other ROS          Physical Exam    Airway  airway exam normal           Respiratory Devices and Support         Dental  no notable dental history         Cardiovascular             Pulmonary               Other findings: Virtual visit    OUTSIDE LABS:  CBC:   Lab Results   Component Value Date    WBC 9.5 10/11/2020    WBC 11.8 (H) 10/10/2020    HGB 13.7 10/11/2020    HGB 13.7 10/10/2020    HCT 42.3 10/11/2020    HCT 40.7 10/10/2020     10/11/2020     10/10/2020     BMP:   Lab Results   Component Value Date     10/11/2020     10/10/2020    POTASSIUM 3.9 10/11/2020    POTASSIUM 3.9 10/10/2020    CHLORIDE 105 10/11/2020    CHLORIDE 107 10/10/2020    CO2 30 10/11/2020    CO2 27 10/10/2020    BUN 10 10/11/2020    BUN 14 10/10/2020    CR 1.08 10/11/2020    CR 1.03 10/10/2020     (H) 10/11/2020     (H) 10/10/2020     COAGS:   Lab Results   Component Value Date    INR 1.0 09/17/2020     POC:   Lab Results   Component Value Date    BGM 85 10/09/2020     HEPATIC: No results found for: ALBUMIN, PROTTOTAL, ALT, AST, GGT, ALKPHOS, BILITOTAL, BILIDIRECT, HAM  OTHER:   Lab Results   Component Value Date    WANDA 8.8 10/11/2020    MAG 1.9 10/10/2020       Anesthesia Plan    ASA Status:  1   NPO Status:  NPO Appropriate    Anesthesia Type: MAC.     - Reason for MAC: straight local not clinically adequate   Induction: Intravenous, Propofol.   Maintenance: TIVA.        Consents    Anesthesia Plan(s) and associated risks, benefits, and realistic alternatives discussed. Questions answered and patient/representative(s) expressed understanding.     - Discussed with:  Patient         Postoperative Care    Pain management: IV analgesics, Oral pain medications, Multi-modal analgesia.   PONV prophylaxis: Ondansetron (or other 5HT-3), Background Propofol Infusion     Comments:              PAC Discussion and Assessment    ASA Classification:  1  Case is suitable for: ASC  Anesthetic techniques and relevant risks discussed: MAC with GA as backup  Invasive monitoring and risk discussed: No    Possibility and Risk of blood transfusion discussed: No            PAC Resident/NP Anesthesia Assessment: Julio Muñiz is a 20 year old male scheduled to undergo Removal of hardware after pectus excavatum repair with Dr. Antonio Ospina on 5/26/21. He has the following specific operative considerations:   - RCRI : No serious cardiac risks.   - VTE risk: 0.5%  - OSMEL # of risks 1/8 = Low risk  - Risk of PONV score = 2.  If > 2, anti-emetic intervention recommended.    --Recent chest discomfort after repair of pectus excavatum with hardware. Above procedure planned with removal of hardware with MAC and local. Patient comfortable with plan.  --No history of problems with anesthesia. He felt like it took him a long time to get back to normal last time. Patient doesn't like needles or blood.  --Generally healthy with no significant cardiopulmonary history. Nonsmoker. Good activity tolerance. Stress echocardiogram above LVEF=55-60%. The mitral leaflets appear thickened and myxomatous.There is mitral valve prolapse but no significant mitral regurgitation. The chordae appear redundant. The tricuspid leaflets appear thickened and myxomatous. There is tricuspid valve prolapse with mild tricuspid regurgitation.         Patient was discussed with Dr Freeman. Patient is optimized and is acceptable candidate for the proposed procedure.  No further diagnostic evaluation is needed.       Reviewed and Signed by PAC Mid-Level Provider/Resident  Mid-Level Provider/Resident: ROGER Pino, CNS  Date: 5/21/21  Time: 4:00pm                               ROGER Paulino CNS

## 2021-05-21 NOTE — PATIENT INSTRUCTIONS
Preparing for Your Surgery      Name:  Julio Muñiz   MRN:  [id number]   :  2000   Today's Date:  2021         Arriving for surgery:  Surgery date:  21  Arrival time:  7 am    Restrictions due to COVID 19:  One consistent visitor is allowed per patient  No ill visitors  All visitors must wear face mask     parking is available for anyone with mobility limitations or disabilities. (Monday- Friday 7 am- 5 pm)    Please come to:    [facility]  [address] [place] [phone]    Please check in on the 5th floor at the Ambulatory Surgery Center       What can I eat or drink?    -  You may eat and drink normally until 8 hours before surgery. (Until 12:30 am)  -  You may have clear liquids up to 4 hours before surgery. (Until 4:30 am)  Examples of clear liquids:  Water  Clear broth  Juices (apple, white grape, white cranberry  and cider) without pulp  Noncarbonated, powder based beverages  (lemonade and Alirio-Aid)  Sodas (Sprite, 7-Up, ginger ale and seltzer)  Coffee or tea (without milk or cream)  Gatorade    --No alcohol for at least 24 hours before surgery    Which medicines can I take?    Hold Aspirin for 7 days before surgery.   Hold Multivitamins for 7 days before surgery.  Hold Supplements for 7 days before surgery.  * Hold Ibuprofen (Advil, Motrin) for 1 day before surgery--unless otherwise directed by surgeon.  Hold Naproxen (Aleve) for 4 days before surgery.      -  PLEASE TAKE the following medications the day of surgery   Acetaminophen (Tylenol) if needed  Oxycodone if needed    How do I prepare myself?  - Please take 2 showers before surgery using Scrubcare or Hibiclens soap.    Use this soap only from the neck to your toes.     Leave the soap on your skin for one minute--then rinse thoroughly.      You may use your own shampoo and conditioner; no other hair products.   - Please remove all jewelry and body piercings.  - No lotions, deodorants or  fragrance.  - Bring your ID and insurance card.        - All patients are required to have a Covid-19 test within 4 days of surgery/procedure.      -Patients will be contacted by the [facility] scheduling team within 1 week of surgery to make an appointment.      - Patients may call the Scheduling team at [phone] if they have not been scheduled within 4 days of  surgery.      ALL PATIENTS ARE REQUIRED TO HAVE A RESPONSIBLE ADULT TO DRIVE AND BE IN ATTENDANCE WITH THEM FOR 24 HOURS FOLLOWING SURGERY       Questions or Concerns:    -For questions regarding the day of surgery please contact the Ambulatory Surgery Center at [phone].    -If you have health changes between today and your surgery please contact your surgeon.     For questions after surgery please call your surgeons office.    AFTER YOUR SURGERY  Breathing exercises   Breathing exercises help you recover faster. Take deep breaths and let the air out slowly. This will:     Help you wake up after surgery.    Help prevent complications like pneumonia.  Preventing complications will help you go home sooner.   Nausea and vomiting   You may feel sick to your stomach after surgery; if so, let your nurse know.    Pain control:  After surgery, you may have pain. Our goal is to help you manage your pain. Pain medicine will help you feel comfortable enough to do activities that will help you heal.  These activities may include breathing exercises, walking and physical therapy.   To help your health care team treat your pain we will ask: 1) If you have pain  2) where it is located 3) describe your pain in your words  Methods of pain control include medications given by mouth, vein or by nerve block for some surgeries.  Sequential Compression Device (SCD):  You may need to wear SCD S (also called pneumo boots)on your legs or feet. These are wraps connected to a machine that pumps in air and releases it. The repeated pumping helps prevent blood clots  from forming.

## 2021-05-21 NOTE — PROGRESS NOTES
Junior is a 20 year old who is being evaluated via a billable video visit.      How would you like to obtain your AVS? MyChart    HPI       Review of Systems         Objective    Vitals - Patient Reported  Pain Score: No Pain (0)        Physical Exam     ADDIS Marsh LPN

## 2021-05-21 NOTE — H&P (VIEW-ONLY)
Pre-Operative H & P     CC:  Preoperative exam to assess for increased cardiopulmonary risk while undergoing surgery and anesthesia.    Date of Encounter: 2021  Primary Care Physician:  Reese Kothari  Reason for visit: Pectus excavatum [Q67.6]  HPI  Julio Muñiz is a 20 year old male who presents for pre-operative H & P in preparation for Removal of hardware after pectus excavatum repair with Dr. Antonio Ospina on 21 at [facility]. History is obtained from the patient and medical records.    At the beginning of this visit patient ID was confirmed using name and .     Video-Visit Details    Type of service:  Video Visit    Patient verbally consented to video service today: YES      Video Start Time: 3:54pm  Video End Time (time video stopped): 4:05pm    Originating Location (pt. Location): Home    Distant Location (provider location):  [facility] PREOPERATIVE [facility]ESS[facility]    Mode of Communication:  Video Conference via frestyl    Patient with history of repair of pectus excavatum with hardware on 10/9/20 by Dr. Antonio Ospina, who recently presented to the ED for evaluation of chest pain, worsening over the past two weeks, especially with deep inhalations and palpation to his chest. He had also noticed a popping sensation. The Thoracic team was notified, and patient and family have been counseled for above procedure.    He is otherwise healthy. Today patient denies fever, cough, or irregular HR. He continues to have some pain in his chest especially when he takes a deep breath. He is taking oxycodone rarely, and typically takes Tylenol or Ibuprofen.     Past Medical History  Past Medical History:   Diagnosis Date     Pectus excavatum        Past Surgical History  Past Surgical History:   Procedure Laterality Date     ADENOIDECTOMY       MYRINGOTOMY BILATERAL       REPAIR PECTUS EXCAVATUM N/A 10/09/2020    Procedure: Modified Ravitch Repair with sternal plating;   Surgeon: Maya Anaya MD;  Location: [facility] OR     TYMPANOPLASTY      harvest facial graft       Hx of Blood transfusions/reactions: Denies.     Hx of abnormal bleeding or anti-platelet use: Denies.     Menstrual history: No LMP for male patient.    Steroid use in the last year: Denies.     Personal or FH with difficulty with Anesthesia:  Denies. He feels like it took him a long time to wake up and feel like his normal self after last surgery.    Prior to Admission Medications  Current Outpatient Medications   Medication Sig Dispense Refill     acetaminophen (TYLENOL) 325 MG tablet Take 3 tablets (975 mg) by mouth every 6 hours       ibuprofen (ADVIL/MOTRIN) 600 MG tablet Take 1 tablet (600 mg) by mouth every 6 hours (Patient taking differently: Take 600 mg by mouth every 6 hours )       oxyCODONE (ROXICODONE) 5 MG tablet TAKE 1 TABLET BY MOUTH EVERY 4 HOURS AS NEEDED FOR PAIN       oxyCODONE (ROXICODONE) 10 MG tablet Take 1 tablet (10 mg) by mouth every 6 hours as needed for pain (Patient not taking: Reported on 5/21/2021) 10 tablet 0       Allergies  No Known Allergies    Social History  Social History     Socioeconomic History     Marital status: Single     Spouse name: Not on file     Number of children: Not on file     Years of education: Not on file     Highest education level: Not on file   Occupational History     Not on file   Social Needs     Financial resource strain: Not on file     Food insecurity     Worry: Not on file     Inability: Not on file     Transportation needs     Medical: Not on file     Non-medical: Not on file   Tobacco Use     Smoking status: Never Smoker     Smokeless tobacco: Never Used   Substance and Sexual Activity     Alcohol use: Not Currently     Comment: none over one month     Drug use: No     Sexual activity: Not on file   Lifestyle     Physical activity     Days per week: Not on file     Minutes per session: Not on file     Stress: Not on file   Relationships     Social  connections     Talks on phone: Not on file     Gets together: Not on file     Attends Confucianist service: Not on file     Active member of club or organization: Not on file     Attends meetings of clubs or organizations: Not on file     Relationship status: Not on file     Intimate partner violence     Fear of current or ex partner: Not on file     Emotionally abused: Not on file     Physically abused: Not on file     Forced sexual activity: Not on file   Other Topics Concern     Not on file   Social History Narrative     Not on file       Family History  Family History   Problem Relation Age of Onset     Asthma Mother      ROS/MED HISTORY  The complete review of systems is negative other than noted in the HPI or here.    ENT/Pulmonary:  - neg pulmonary ROS     Neurologic:  - neg neurologic ROS     Cardiovascular:  - neg cardiovascular ROS   (+) -----Previous cardiac testing   Echo: Date: Results:    Stress Test: Date: 8//18/20 Results:    ECG Reviewed: Date: 9/17/20 Results:  SB with occ ectopic premature beats, poss LAE, inc RBBB, moderate ST depression  Cath: Date: Results:      METS/Exercise Tolerance: >4 METS    Hematologic:  - neg hematologic  ROS     Musculoskeletal:  - neg musculoskeletal ROS     GI/Hepatic:  - neg GI/hepatic ROS     Renal/Genitourinary:  - neg Renal ROS     Endo:  - neg endo ROS     Psychiatric/Substance Use:  - neg psychiatric ROS     Infectious Disease:  - neg infectious disease ROS     Malignancy:  - neg malignancy ROS     Other:  - neg other ROS          LABS: Personally reviewed  CBC:   Lab Results   Component Value Date    WBC 9.5 10/11/2020    WBC 11.8 (H) 10/10/2020    HGB 13.7 10/11/2020    HGB 13.7 10/10/2020    HCT 42.3 10/11/2020    HCT 40.7 10/10/2020     10/11/2020     10/10/2020     BMP:   Lab Results   Component Value Date     10/11/2020     10/10/2020    POTASSIUM 3.9 10/11/2020    POTASSIUM 3.9 10/10/2020    CHLORIDE 105 10/11/2020    CHLORIDE 107  10/10/2020    CO2 30 10/11/2020    CO2 27 10/10/2020    BUN 10 10/11/2020    BUN 14 10/10/2020    CR 1.08 10/11/2020    CR 1.03 10/10/2020     (H) 10/11/2020     (H) 10/10/2020     COAGS:   Lab Results   Component Value Date    INR 1.0 09/17/2020     POC:   Lab Results   Component Value Date    BGM 85 10/09/2020     OTHER:   Lab Results   Component Value Date    WANDA 8.8 10/11/2020    MAG 1.9 10/10/2020       Physical Exam  Constitutional: Awake, alert, no apparent distress, and appears stated age.  Respiratory: No cough or obvious dyspnea.  Neuropsychiatric: Calm, cooperative. Normal affect.     Please refer to the physical examination documented by the anesthesiologist in the anesthesia record on the day of surgery    EKG: Personally reviewed 9/17/20 Sinus bradycardia, with occasional premature beats, possible left atrial enlargement, incomplete RBBB, moderate ST depression.   Stress test: Bicycle stress test 8/18/20  Impression: Slightly abnormal resting EKG.  Maximal bicycle stress echo.  No evidence for exercise-induced dysrhythmia.  No evidence for exercise-induced myocardial ischemia, Definity enhanced echo images pending.    Stress echo 8/18/20  Interpretation Summary   Normal, low-risk exercise echocardiogram without evidence of ischemia.   The target heart rate was achieved. Normal heart rate and BP response to exercise.   Normal biventricular size, thickness, and global systolic function at baseline, LVEF=55-60%.   With exercise, LVEF increased to >65% and LV cavity size decreased appropriately.   No regional wall motion abnormalities are present at rest or with exercise.   Exercise ECG portion of the test (including functional capacity) is reported separately.   The mitral leaflets appear thickened and myxomatous.There is mitral valve prolapse but no significant mitral regurgitation. The chordae appear redundant.   The tricuspid leaflets appear thickened and myxomatous. There is tricuspid  valve prolapse with mild tricuspid regurgitation.   Normal aortic root and proximal ascending aorta, measuring 2.8 cm (1.4 cm/m2) and 2.1 cm (1.1 cm/m2) respectively.     5/7/21 CXR                                                        IMPRESSION: Stable cardiomediastinal silhouette. No focal consolidation or pleural effusion. Postsurgical change pectus excavatum repair. Stable chest.    Imaging and cardiac testing reviewed by this provider      Outside records reviewed from: Care Everywhere    ASSESSMENT and PLAN  Julio Muñiz is a 20 year old male scheduled to undergo Removal of hardware after pectus excavatum repair with Dr. Antonio Ospina on 5/26/21. He has the following specific operative considerations:   - RCRI : No serious cardiac risks.   - Anesthesia considerations:  Refer to PAC assessment in anesthesia records  - VTE risk: 0.5%  - OSMEL # of risks 1/8 = Low risk  - Risk of PONV score = 2.  If > 2, anti-emetic intervention recommended.    --Recent chest discomfort after repair of pectus excavatum with hardware. Above procedure planned with removal of hardware with MAC and local. Patient comfortable with plan.  --No history of problems with anesthesia. Feels like it took him a long time to get back to normal after last anesthesia. Patient doesn't like needles or blood.  --Generally healthy with no significant cardiopulmonary history. Nonsmoker. Good activity tolerance. Stress echocardiogram above LVEF=55-60%. The mitral leaflets appear thickened and myxomatous.There is mitral valve prolapse but no significant mitral regurgitation. The chordae appear redundant. The tricuspid leaflets appear thickened and myxomatous. There is tricuspid valve prolapse with mild tricuspid regurgitation.     Arrival time, NPO, shower and medication instructions provided by nursing staff today.       Patient was discussed with Dr Freeman. Patient is optimized and is acceptable candidate for the proposed procedure.  No  further diagnostic evaluation is needed.       ROGER Paulino CNS  Preoperative Assessment Center  [facility]  Phone: [phone]  Fax: [phone]

## 2021-05-25 DIAGNOSIS — Z11.59 ENCOUNTER FOR SCREENING FOR OTHER VIRAL DISEASES: ICD-10-CM

## 2021-05-25 LAB
LABORATORY COMMENT REPORT: NORMAL
SARS-COV-2 RNA RESP QL NAA+PROBE: NEGATIVE
SARS-COV-2 RNA RESP QL NAA+PROBE: NORMAL
SPECIMEN SOURCE: NORMAL
SPECIMEN SOURCE: NORMAL

## 2021-05-25 PROCEDURE — U0003 INFECTIOUS AGENT DETECTION BY NUCLEIC ACID (DNA OR RNA); SEVERE ACUTE RESPIRATORY SYNDROME CORONAVIRUS 2 (SARS-COV-2) (CORONAVIRUS DISEASE [COVID-19]), AMPLIFIED PROBE TECHNIQUE, MAKING USE OF HIGH THROUGHPUT TECHNOLOGIES AS DESCRIBED BY CMS-2020-01-R: HCPCS | Performed by: THORACIC SURGERY (CARDIOTHORACIC VASCULAR SURGERY)

## 2021-05-25 PROCEDURE — U0005 INFEC AGEN DETEC AMPLI PROBE: HCPCS | Performed by: THORACIC SURGERY (CARDIOTHORACIC VASCULAR SURGERY)

## 2021-05-26 ENCOUNTER — HOSPITAL ENCOUNTER (OUTPATIENT)
Facility: AMBULATORY SURGERY CENTER | Age: 21
End: 2021-05-26
Attending: THORACIC SURGERY (CARDIOTHORACIC VASCULAR SURGERY) | Admitting: THORACIC SURGERY (CARDIOTHORACIC VASCULAR SURGERY)
Payer: COMMERCIAL

## 2021-05-26 ENCOUNTER — ANESTHESIA (OUTPATIENT)
Dept: SURGERY | Facility: AMBULATORY SURGERY CENTER | Age: 21
End: 2021-05-26
Payer: COMMERCIAL

## 2021-05-26 ENCOUNTER — ANCILLARY PROCEDURE (OUTPATIENT)
Dept: GENERAL RADIOLOGY | Facility: CLINIC | Age: 21
End: 2021-05-26
Attending: THORACIC SURGERY (CARDIOTHORACIC VASCULAR SURGERY)
Payer: COMMERCIAL

## 2021-05-26 VITALS
HEIGHT: 74 IN | RESPIRATION RATE: 16 BRPM | WEIGHT: 150 LBS | BODY MASS INDEX: 19.25 KG/M2 | SYSTOLIC BLOOD PRESSURE: 132 MMHG | OXYGEN SATURATION: 98 % | TEMPERATURE: 96.5 F | HEART RATE: 70 BPM | DIASTOLIC BLOOD PRESSURE: 71 MMHG

## 2021-05-26 DIAGNOSIS — Q67.6 PECTUS EXCAVATUM: ICD-10-CM

## 2021-05-26 LAB — RADIOLOGIST FLAGS: ABNORMAL

## 2021-05-26 PROCEDURE — 71045 X-RAY EXAM CHEST 1 VIEW: CPT | Performed by: RADIOLOGY

## 2021-05-26 PROCEDURE — 20680 REMOVAL OF IMPLANT DEEP: CPT

## 2021-05-26 RX ORDER — FENTANYL CITRATE 50 UG/ML
INJECTION, SOLUTION INTRAMUSCULAR; INTRAVENOUS PRN
Status: DISCONTINUED | OUTPATIENT
Start: 2021-05-26 | End: 2021-05-26

## 2021-05-26 RX ORDER — CEFAZOLIN SODIUM 2 G/50ML
2 SOLUTION INTRAVENOUS
Status: DISCONTINUED | OUTPATIENT
Start: 2021-05-26 | End: 2021-05-27 | Stop reason: HOSPADM

## 2021-05-26 RX ORDER — NALOXONE HYDROCHLORIDE 0.4 MG/ML
0.4 INJECTION, SOLUTION INTRAMUSCULAR; INTRAVENOUS; SUBCUTANEOUS
Status: DISCONTINUED | OUTPATIENT
Start: 2021-05-26 | End: 2021-05-27 | Stop reason: HOSPADM

## 2021-05-26 RX ORDER — CEFAZOLIN SODIUM 2 G/50ML
2 SOLUTION INTRAVENOUS SEE ADMIN INSTRUCTIONS
Status: DISCONTINUED | OUTPATIENT
Start: 2021-05-26 | End: 2021-05-27 | Stop reason: HOSPADM

## 2021-05-26 RX ORDER — NALOXONE HYDROCHLORIDE 0.4 MG/ML
0.2 INJECTION, SOLUTION INTRAMUSCULAR; INTRAVENOUS; SUBCUTANEOUS
Status: DISCONTINUED | OUTPATIENT
Start: 2021-05-26 | End: 2021-05-27 | Stop reason: HOSPADM

## 2021-05-26 RX ORDER — SODIUM CHLORIDE, SODIUM LACTATE, POTASSIUM CHLORIDE, CALCIUM CHLORIDE 600; 310; 30; 20 MG/100ML; MG/100ML; MG/100ML; MG/100ML
INJECTION, SOLUTION INTRAVENOUS CONTINUOUS
Status: DISCONTINUED | OUTPATIENT
Start: 2021-05-26 | End: 2021-05-27 | Stop reason: HOSPADM

## 2021-05-26 RX ORDER — ONDANSETRON 4 MG/1
4 TABLET, ORALLY DISINTEGRATING ORAL EVERY 30 MIN PRN
Status: DISCONTINUED | OUTPATIENT
Start: 2021-05-26 | End: 2021-05-27 | Stop reason: HOSPADM

## 2021-05-26 RX ORDER — OXYCODONE HYDROCHLORIDE 5 MG/1
5 TABLET ORAL EVERY 4 HOURS PRN
Status: DISCONTINUED | OUTPATIENT
Start: 2021-05-26 | End: 2021-05-27 | Stop reason: HOSPADM

## 2021-05-26 RX ORDER — PROPOFOL 10 MG/ML
INJECTION, EMULSION INTRAVENOUS CONTINUOUS PRN
Status: DISCONTINUED | OUTPATIENT
Start: 2021-05-26 | End: 2021-05-26

## 2021-05-26 RX ORDER — BUPIVACAINE HYDROCHLORIDE 2.5 MG/ML
INJECTION, SOLUTION INFILTRATION; PERINEURAL PRN
Status: DISCONTINUED | OUTPATIENT
Start: 2021-05-26 | End: 2021-05-26 | Stop reason: HOSPADM

## 2021-05-26 RX ORDER — GABAPENTIN 300 MG/1
300 CAPSULE ORAL ONCE
Status: COMPLETED | OUTPATIENT
Start: 2021-05-26 | End: 2021-05-26

## 2021-05-26 RX ORDER — HYDROMORPHONE HYDROCHLORIDE 1 MG/ML
.3-.5 INJECTION, SOLUTION INTRAMUSCULAR; INTRAVENOUS; SUBCUTANEOUS EVERY 10 MIN PRN
Status: DISCONTINUED | OUTPATIENT
Start: 2021-05-26 | End: 2021-05-27 | Stop reason: HOSPADM

## 2021-05-26 RX ORDER — FENTANYL CITRATE 50 UG/ML
25-50 INJECTION, SOLUTION INTRAMUSCULAR; INTRAVENOUS
Status: DISCONTINUED | OUTPATIENT
Start: 2021-05-26 | End: 2021-05-27 | Stop reason: HOSPADM

## 2021-05-26 RX ORDER — LIDOCAINE 40 MG/G
CREAM TOPICAL
Status: DISCONTINUED | OUTPATIENT
Start: 2021-05-26 | End: 2021-05-27 | Stop reason: HOSPADM

## 2021-05-26 RX ORDER — ONDANSETRON 2 MG/ML
4 INJECTION INTRAMUSCULAR; INTRAVENOUS EVERY 30 MIN PRN
Status: DISCONTINUED | OUTPATIENT
Start: 2021-05-26 | End: 2021-05-27 | Stop reason: HOSPADM

## 2021-05-26 RX ORDER — LIDOCAINE HYDROCHLORIDE AND EPINEPHRINE 10; 10 MG/ML; UG/ML
INJECTION, SOLUTION INFILTRATION; PERINEURAL PRN
Status: DISCONTINUED | OUTPATIENT
Start: 2021-05-26 | End: 2021-05-26 | Stop reason: HOSPADM

## 2021-05-26 RX ORDER — ACETAMINOPHEN 325 MG/1
975 TABLET ORAL ONCE
Status: COMPLETED | OUTPATIENT
Start: 2021-05-26 | End: 2021-05-26

## 2021-05-26 RX ADMIN — GABAPENTIN 300 MG: 300 CAPSULE ORAL at 07:26

## 2021-05-26 RX ADMIN — CEFAZOLIN SODIUM 2 G: 2 SOLUTION INTRAVENOUS at 09:47

## 2021-05-26 RX ADMIN — FENTANYL CITRATE 25 MCG: 50 INJECTION, SOLUTION INTRAMUSCULAR; INTRAVENOUS at 10:21

## 2021-05-26 RX ADMIN — ACETAMINOPHEN 975 MG: 325 TABLET ORAL at 07:26

## 2021-05-26 RX ADMIN — OXYCODONE HYDROCHLORIDE 5 MG: 5 TABLET ORAL at 10:51

## 2021-05-26 RX ADMIN — PROPOFOL 150 MCG/KG/MIN: 10 INJECTION, EMULSION INTRAVENOUS at 09:45

## 2021-05-26 RX ADMIN — FENTANYL CITRATE 25 MCG: 50 INJECTION, SOLUTION INTRAMUSCULAR; INTRAVENOUS at 10:16

## 2021-05-26 RX ADMIN — SODIUM CHLORIDE, SODIUM LACTATE, POTASSIUM CHLORIDE, CALCIUM CHLORIDE: 600; 310; 30; 20 INJECTION, SOLUTION INTRAVENOUS at 07:40

## 2021-05-26 RX ADMIN — FENTANYL CITRATE 25 MCG: 50 INJECTION, SOLUTION INTRAMUSCULAR; INTRAVENOUS at 10:27

## 2021-05-26 ASSESSMENT — MIFFLIN-ST. JEOR: SCORE: 1750.15

## 2021-05-26 NOTE — BRIEF OP NOTE
[facility]    Brief Operative Note    Pre-operative diagnosis: Pectus excavatum [Q67.6]  Post-operative diagnosis Same    Procedure: Procedure(s):  Chest wall hardware removal  Surgeon: Surgeon(s) and Role:     * Maya Anaya MD - Primary  Anesthesia: Combined MAC with Local   Estimated blood loss: Minimal  Drains: None  Specimens: Retrosternal bar x 2.   Findings:   Right pneumothorax upon removal of lower bar. Evacuated prior to closure. Both bars removed.   Complications: None.  Implants:   Implant Name Type Inv. Item Serial No.  Lot No. LRB No. Used Action   Pectus Bar     N/A N/A 1 Explanted   Pectus Bar     N/A N/A 1 Explanted

## 2021-05-26 NOTE — DISCHARGE INSTRUCTIONS
"[facility]  Home Care Following Anesthesia  Diet: You may resume normal diet.   Shower: You may shower starting tomorrow. No submerging in water for 2 weeks.   Activity: Avoid strenuous activity for 2 weeks, no lifting more than 10 pounds.     For 24 hours after surgery:  1. Get plenty of rest.  A responsible adult must stay with you for at least 24 hours after you leave the surgery center.  2. Do not drive or use heavy equipment.  If you have weakness or tingling, don't drive or use heavy equipment until this feeling goes away.   3. Do not drink alcohol.   4. Avoid strenuous or risky activities.  Ask for help when climbing stairs.  5. You may feel lightheaded.  IF so, sit for a few minutes before standing.  Have someone help you get up.   6. If you have nausea (feel sick to your stomach): Drink only clear liquids such as apple juice, ginger ale, broth or 7-Up.  Rest may also help.  Be sure to drink enough fluids.  Move to a regular diet as you feel able.   7. You may have a slight fever.  Call the doctor if your fever is over 100 F (37.7 C) (taken under the tongue) or lasts longer than 24 hours.  8. You may have a dry mouth, a sore throat, muscle aches or trouble sleeping. These should go away after 24 hours.  9. Do not make important or legal decisions.   10. It is recommended to avoid smoking.        Today you received a Marcaine or bupivacaine block to numb the nerves near your surgery site.  This is a block using local anesthetic or \"numbing\" medication injected around the nerves to anesthetize or \"numb\" the area supplied by those nerves.  This block is injected into the muscle layer near your surgical site.  The medication may numb the location where you had surgery for 6-18 hours, but may last up to 24 hours.  If your surgical site is an arm or leg you should be careful with your affected limb, since it is possible to injure your limb without being aware of it due to the " numbing.  Until full feeling returns, you should guard against bumping or hitting your limb, and avoid extreme hot or cold temperatures on the skin.  As the block wears off, the feeling will return as a tingling or prickly sensation near your surgical site.  You will experience more discomfort from your incision as the feeling returns.  You may want to take a pain pill (a narcotic or Tylenol if this was prescribed by your surgeon) when you start to experience mild pain before the pain beccomes more severe.  If your pain medications do not control your pain you should notifiy your surgeon.    Tips for taking pain medications  To get the best pain relief possible, remember these points:    Take pain medications as directed, before pain becomes severe.    Pain medication can upset your stomach: taking it with food may help.    Constipation is a common side effect of pain medication. Drink plenty of  fluids.    Eat foods high in fiber. Take a stool softener if recommended by your doctor or pharmacist.    Do not drink alcohol, drive or operate machinery while taking pain medications.    Ask about other ways to control pain, such as with heat, ice or relaxation.    Tylenol/Acetaminophen Consumption  To help encourage the safe use of acetaminophen, the makers of TYLENOL  have lowered the maximum daily dose for single-ingredient Extra Strength TYLENOL  (acetaminophen) products sold in the [place] from 8 pills per day (4,000 mg) to 6 pills per day (3,000 mg). The dosing interval has also changed from 2 pills every 4-6 hours to 2 pills every 6 hours.    If you feel your pain relief is insufficient, you may take Tylenol/Acetaminophen in addition to your narcotic pain medication.     Be careful not to exceed 3,000 mg of Tylenol/Acetaminophen in a 24 hour period from all sources.    If you are taking extra strength Tylenol/acetaminophen (500 mg), the maximum dose is 6 tablets in 24 hours.    If you are taking regular strength  acetaminophen (325 mg), the maximum dose is 9 tablets in 24 hours.    Call a doctor for any of the followin. Signs of infection (fever, growing tenderness at the surgery site, a large amount of drainage or bleeding, severe pain, foul-smelling drainage, redness, swelling).  2. It has been over 8 to 10 hours since surgery and you are still not able to urinate (pass water).  3. Headache for over 24 hours.  4. Numbness, tingling or weakness the day after surgery (if you had spinal anesthesia).  5. Signs of Covid-19 infection (temperature over 100 degrees, shortness of breath, cough, loss of taste/smell, generalized body aches, persistent headache, chills, sore throat, nausea/vomiting/diarrhea)  Your doctor is:  Dr. Maya Ospina, Thoracic: [phone]  Or dial [phone] and ask for the resident on call for:  Thoracic Surgery  For emergency care, call the:  [facility]:  [phone] (TTY for hearing impaired: [phone])

## 2021-05-26 NOTE — OP NOTE
Procedure Date: 05/26/2021    PREOPERATIVE DIAGNOSES:  1.  History of pectus excavatum, status post modified Ravitch repair.  2.  Retrosternal bar placement and sternal plating.    POSTOPERATIVE DIAGNOSES:  1.  History of pectus excavatum, status post modified Ravitch repair.  2.  Retrosternal bar placement and sternal plating.    PROCEDURE PERFORMED:   Planned retrosternal bar removal x2.    ANESTHESIA:  Local and MAC.    SURGEON:  Maya Ospina MD    ASSISTANT:  None.    COMPLICATIONS:  None.    ESTIMATED BLOOD LOSS:  Minimal.    SPECIMENS:  Retrosternal bar x2.    OPERATIVE FINDINGS:  We made an incision in the lateral aspect of the previous scar.  The patient had extensive scar tissue already formed.  While removing the lower bar the chest wall was in close proximity to the pleura and a pneumothorax was encountered after removal of the bar.  We confirmed hemostasis and placed a rubber catheter to evacuate the pneumothorax at the end of the case.    DESCRIPTION OF THE PROCEDURE IN DETAIL:  The patient was taken to the operating room, laid supine.  Adequate procedural sedation was achieved, the chest was prepped with ChloraPrep and draped in normal sterile fashion.  A formal timeout was carried out confirming the name of the patient and correct procedure.  He had SCDs in place and functioning prior to induction of anesthesia.  He received antibiotics within 30 minutes of incision.    After the timeout was completed, we started by injecting local anesthetic, we made an incision in the previous scar in the right lateral end of the patient.  The incision was taken down to the chest wall.  We found that the patient had extensive scar tissue from previous operation.  This was a very challenging part of the procedure.  However, we found first the upper bar, the scar tissue was freed up from the bar and using a surgical pliers, we pulled the bar without any difficulty.  Next, we looked for the lower bar and we  found the lateral end dissected out from the scar tissue that was surrounding it.  While doing this, we encountered a right pneumothorax.  The bar was then removed.  We confirmed hemostasis. In order to evacuate the pneumothorax we placed a separate stab incision and placed a red rubber catheter into the right pleural space and submerged that into a water seal.  We then closed the wound in multiple layers using 2-0 Vicryl for the deep dermis and the subdermis and the skin was closed with 4-0 Monocryl After the wound was completely closed and Exofin applied to the skin incision we had the patient cough a few times evacuated the pneumothorax and then the red rubber catheter was pulled out.  A stitch was placed in the entry site of the catheter and Exofin was applied.  The patient tolerated the procedure well.  All instrument and sponge counts were correct at the end of the case.  The patient was then transferred to PACU for recovery.    Maya Ospina MD        D: 2021   T: 2021   MT: [id number]    Name:     IRVIN LOCKETTTacos  MRN:      [id number]        Account:        [id number]   :      2000           Procedure Date: 2021     Document: [id number]

## 2021-05-26 NOTE — ANESTHESIA POSTPROCEDURE EVALUATION
Patient: Julio Muñiz    Procedure(s):  Chest wall hardware removal    Diagnosis:Pectus excavatum [Q67.6]  Diagnosis Additional Information: No value filed.    Anesthesia Type:  MAC    Note:  Disposition: Outpatient   Postop Pain Control: Uneventful            Sign Out: Well controlled pain   PONV: No   Neuro/Psych: Uneventful            Sign Out: Acceptable/Baseline neuro status   Airway/Respiratory:             Sign Out: Acceptable/Baseline resp. status (Patient with pneumothorax in OR related to surgical procedure. Patient denies respiratory complaints and surgeon to send patient home post-op due to size of pneumo.)   CV/Hemodynamics: Uneventful            Sign Out: Acceptable CV status; No obvious hypovolemia; No obvious fluid overload   Other NRE: NONE   DID A NON-ROUTINE EVENT OCCUR? No           Last vitals:  Vitals:    05/26/21 0720 05/26/21 1046 05/26/21 1107   BP: 133/82 134/78 132/71   Pulse:  83 70   Resp:  16 16   Temp:  35.8  C (96.5  F)    SpO2:  96% 98%       Last vitals prior to Anesthesia Care Transfer:  CRNA VITALS  5/26/2021 1012 - 5/26/2021 1112      5/26/2021             Resp Rate (set):  10          Electronically Signed By: Richard Rausch MD  May 26, 2021  2:07 PM

## 2021-06-11 ENCOUNTER — TELEPHONE (OUTPATIENT)
Dept: SURGERY | Facility: CLINIC | Age: 21
End: 2021-06-11

## 2021-06-11 NOTE — TELEPHONE ENCOUNTER
----- Message from ROGER Rey sent at 6/11/2021 12:03 PM CDT -----  Regarding: In person f/up with Antonio requested  Pt wants an in person follow-up after pectus surgery in the next few weeks with Antonio.  Can you reach out to him and set this up please?  Thanks  Krery

## 2021-06-16 ENCOUNTER — OFFICE VISIT (OUTPATIENT)
Dept: SURGERY | Facility: CLINIC | Age: 21
End: 2021-06-16
Attending: THORACIC SURGERY (CARDIOTHORACIC VASCULAR SURGERY)
Payer: COMMERCIAL

## 2021-06-16 VITALS
HEART RATE: 69 BPM | DIASTOLIC BLOOD PRESSURE: 71 MMHG | OXYGEN SATURATION: 99 % | TEMPERATURE: 98.7 F | SYSTOLIC BLOOD PRESSURE: 125 MMHG | RESPIRATION RATE: 16 BRPM | BODY MASS INDEX: 20.11 KG/M2 | WEIGHT: 156.6 LBS

## 2021-06-16 DIAGNOSIS — Q67.6 PECTUS EXCAVATUM: Primary | ICD-10-CM

## 2021-06-16 PROCEDURE — G0463 HOSPITAL OUTPT CLINIC VISIT: HCPCS

## 2021-06-16 PROCEDURE — 99024 POSTOP FOLLOW-UP VISIT: CPT | Performed by: THORACIC SURGERY (CARDIOTHORACIC VASCULAR SURGERY)

## 2021-06-16 ASSESSMENT — PAIN SCALES - GENERAL: PAINLEVEL: NO PAIN (0)

## 2021-06-16 NOTE — NURSING NOTE
"Oncology Rooming Note    June 16, 2021 4:13 PM   Julio Muñiz is a 20 year old male who presents for:    No chief complaint on file.    Initial Vitals: /71   Pulse 69   Temp 98.7  F (37.1  C) (Oral)   Resp 16   Wt 71 kg (156 lb 9.6 oz)   SpO2 99%   BMI 20.11 kg/m   Estimated body mass index is 20.11 kg/m  as calculated from the following:    Height as of 5/26/21: 1.88 m (6' 2\").    Weight as of this encounter: 71 kg (156 lb 9.6 oz). Body surface area is 1.93 meters squared.  No Pain (0) Comment: Data Unavailable   No LMP for male patient.  Allergies reviewed: Yes  Medications reviewed: Yes    Medications: Medication refills not needed today.  Pharmacy name entered into [facility]:    [facility] - [place], [place] - [address]   [facility] - [place], [place] - [address] [facility] - [place], [place] - [address] [facility] [place], [place] - [address] [place] [address] [phone]    Clinical concerns: None.       Vicki Stoll MA              "

## 2021-06-16 NOTE — PROGRESS NOTES
THORACIC SURGERY FOLLOW UP VISIT    I saw Mr. Julio Muñiz in follow-up today. The clinical summary follows:     DIAGNOSIS   Severe pectus excavatum  PROCEDURES   5/26/21 planned retrosternal bar removal x 2.   10/9/20 modified Ravitch repair with retrosternal bar placement and sternal plating.     COMPLICATIONS  None    INTERVAL STUDIES  5/26/21 CXR: removal of retrosternal bar x 2.          Pre and post surgery comparison:            Past Medical History:   Diagnosis Date     Pectus excavatum      Past Surgical History:   Procedure Laterality Date     ADENOIDECTOMY       MYRINGOTOMY BILATERAL       REMOVE PORT VASCULAR ACCESS N/A 5/26/2021    Procedure: Chest wall hardware removal;  Surgeon: Maya Anaya MD;  Location: [facility] OR     REPAIR PECTUS EXCAVATUM N/A 10/09/2020    Procedure: Modified Ravitch Repair with sternal plating;  Surgeon: Maya Anaya MD;  Location: [facility] OR     TYMPANOPLASTY      harvest facial graft      No Known Allergies  Current Outpatient Medications   Medication     acetaminophen (TYLENOL) 325 MG tablet     ibuprofen (ADVIL/MOTRIN) 600 MG tablet     No current facility-administered medications for this visit.        ETOH None   TOB None   BMI 19    Exam:   /71   Pulse 69   Temp 98.7  F (37.1  C) (Oral)   Resp 16   Wt 71 kg (156 lb 9.6 oz)   SpO2 99%   BMI 20.11 kg/m    Alert and oriented.   Bilateral breath sounds.   Chest wound well approximated without signs of infection. No seroma or hematoma.       SUBJECTIVE  Junior is doing very well, he recovered without problems after removal of his retrosternal bars. He comes for a post op follow up. He did physical therapy after the index operation, which helped. He has not returned to exercise yet.     From a personal perspective, he comes to clinic by himself.     IMPRESSION  20 year old male patient 8 month after modified Ravitch repair for extreme pectus excavatum.     PLAN  I spent 30 min on the date of the  encounter in chart review, patient visit, review of tests, documentation and/or discussion with other providers about the issues documented above. I reviewed the plan as follows:  I told Junior he could return to his normal daily life. No more activity restrictions.   F/u with thoracic surgery PRN  All questions were answered and the patient and present family were in agreement with the plan.  I appreciate the opportunity to participate in the care of your patient and will keep you updated.  Sincerely,    Maya Bales MD

## 2021-06-16 NOTE — LETTER
6/16/2021         RE: Julio Muñiz  [address]  [facility] [address]        Dear Colleague,    Thank you for referring your patient, Julio Muñiz, to the [facility]. Please see a copy of my visit note below.    THORACIC SURGERY FOLLOW UP VISIT    I saw Mr. Julio Muñiz in follow-up today. The clinical summary follows:     DIAGNOSIS   Severe pectus excavatum  PROCEDURES   5/26/21 planned retrosternal bar removal x 2.   10/9/20 modified Ravitch repair with retrosternal bar placement and sternal plating.     COMPLICATIONS  None    INTERVAL STUDIES  5/26/21 CXR: removal of retrosternal bar x 2.          Pre and post surgery comparison:            Past Medical History:   Diagnosis Date     Pectus excavatum      Past Surgical History:   Procedure Laterality Date     ADENOIDECTOMY       MYRINGOTOMY BILATERAL       REMOVE PORT VASCULAR ACCESS N/A 5/26/2021    Procedure: Chest wall hardware removal;  Surgeon: Maya Anaya MD;  Location: [facility] OR     REPAIR PECTUS EXCAVATUM N/A 10/09/2020    Procedure: Modified Ravitch Repair with sternal plating;  Surgeon: Maay Anaya MD;  Location: [facility] OR     TYMPANOPLASTY      harvest facial graft      No Known Allergies  Current Outpatient Medications   Medication     acetaminophen (TYLENOL) 325 MG tablet     ibuprofen (ADVIL/MOTRIN) 600 MG tablet     No current facility-administered medications for this visit.        ETOH None   TOB None   BMI 19    Exam:   /71   Pulse 69   Temp 98.7  F (37.1  C) (Oral)   Resp 16   Wt 71 kg (156 lb 9.6 oz)   SpO2 99%   BMI 20.11 kg/m    Alert and oriented.   Bilateral breath sounds.   Chest wound well approximated without signs of infection. No seroma or hematoma.       SUBJECTIVE  Junior is doing very well, he recovered without problems after removal of his retrosternal bars. He comes for a post op follow up. He did physical therapy after the index operation, which helped.  He has not returned to exercise yet.     From a personal perspective, he comes to clinic by himself.     IMPRESSION  20 year old male patient 8 month after modified Ravitch repair for extreme pectus excavatum.     PLAN  I spent 30 min on the date of the encounter in chart review, patient visit, review of tests, documentation and/or discussion with other providers about the issues documented above. I reviewed the plan as follows:  I told Junior he could return to his normal daily life. No more activity restrictions.   F/u with thoracic surgery PRN  All questions were answered and the patient and present family were in agreement with the plan.  I appreciate the opportunity to participate in the care of your patient and will keep you updated.  Sincerely,    Maya Bales MD        Again, thank you for allowing me to participate in the care of your patient.        Sincerely,        Bhavesh Alvarez MD

## 2021-09-12 ENCOUNTER — HEALTH MAINTENANCE LETTER (OUTPATIENT)
Age: 21
End: 2021-09-12

## 2022-01-02 ENCOUNTER — HEALTH MAINTENANCE LETTER (OUTPATIENT)
Age: 22
End: 2022-01-02

## 2022-02-20 ENCOUNTER — OFFICE VISIT (OUTPATIENT)
Dept: URGENT CARE | Facility: URGENT CARE | Age: 22
End: 2022-02-20
Payer: COMMERCIAL

## 2022-02-20 ENCOUNTER — HOSPITAL ENCOUNTER (EMERGENCY)
Facility: CLINIC | Age: 22
Discharge: HOME | End: 2022-02-20
Attending: PHYSICIAN ASSISTANT | Admitting: PHYSICIAN ASSISTANT
Payer: COMMERCIAL

## 2022-02-20 ENCOUNTER — APPOINTMENT (OUTPATIENT)
Dept: CT IMAGING | Facility: CLINIC | Age: 22
End: 2022-02-20
Attending: PHYSICIAN ASSISTANT
Payer: COMMERCIAL

## 2022-02-20 VITALS
SYSTOLIC BLOOD PRESSURE: 140 MMHG | TEMPERATURE: 98.2 F | RESPIRATION RATE: 16 BRPM | DIASTOLIC BLOOD PRESSURE: 68 MMHG | WEIGHT: 156 LBS | HEART RATE: 85 BPM | BODY MASS INDEX: 20.03 KG/M2 | OXYGEN SATURATION: 100 %

## 2022-02-20 VITALS
OXYGEN SATURATION: 98 % | SYSTOLIC BLOOD PRESSURE: 128 MMHG | TEMPERATURE: 98 F | HEART RATE: 77 BPM | DIASTOLIC BLOOD PRESSURE: 76 MMHG

## 2022-02-20 DIAGNOSIS — N20.0 NEPHROLITHIASIS: ICD-10-CM

## 2022-02-20 DIAGNOSIS — R31.9 HEMATURIA, UNSPECIFIED TYPE: ICD-10-CM

## 2022-02-20 DIAGNOSIS — R07.89 CHEST TIGHTNESS: ICD-10-CM

## 2022-02-20 DIAGNOSIS — M54.50 ACUTE LEFT-SIDED LOW BACK PAIN WITHOUT SCIATICA: Primary | ICD-10-CM

## 2022-02-20 DIAGNOSIS — N20.1 URETEROLITHIASIS: ICD-10-CM

## 2022-02-20 DIAGNOSIS — R10.9 BILATERAL FLANK PAIN: ICD-10-CM

## 2022-02-20 DIAGNOSIS — R20.0 NUMBNESS OF TOES: ICD-10-CM

## 2022-02-20 DIAGNOSIS — R30.0 DYSURIA: ICD-10-CM

## 2022-02-20 DIAGNOSIS — M89.8X9 BONE ISLAND: ICD-10-CM

## 2022-02-20 LAB
ALBUMIN UR-MCNC: 50 MG/DL
ANION GAP SERPL CALCULATED.3IONS-SCNC: 7 MMOL/L (ref 3–14)
APPEARANCE UR: CLEAR
BASOPHILS # BLD AUTO: 0.1 10E3/UL (ref 0–0.2)
BASOPHILS NFR BLD AUTO: 1 %
BILIRUB UR QL STRIP: NEGATIVE
BUN SERPL-MCNC: 14 MG/DL (ref 7–30)
CALCIUM SERPL-MCNC: 9.6 MG/DL (ref 8.5–10.1)
CHLORIDE BLD-SCNC: 104 MMOL/L (ref 94–109)
CO2 SERPL-SCNC: 29 MMOL/L (ref 20–32)
COLOR UR AUTO: YELLOW
CREAT SERPL-MCNC: 1.11 MG/DL (ref 0.66–1.25)
EOSINOPHIL # BLD AUTO: 0.2 10E3/UL (ref 0–0.7)
EOSINOPHIL NFR BLD AUTO: 3 %
ERYTHROCYTE [DISTWIDTH] IN BLOOD BY AUTOMATED COUNT: 11.9 % (ref 10–15)
GFR SERPL CREATININE-BSD FRML MDRD: >90 ML/MIN/1.73M2
GLUCOSE BLD-MCNC: 114 MG/DL (ref 70–99)
GLUCOSE UR STRIP-MCNC: NEGATIVE MG/DL
HCT VFR BLD AUTO: 48.9 % (ref 40–53)
HGB BLD-MCNC: 16.7 G/DL (ref 13.3–17.7)
HGB UR QL STRIP: ABNORMAL
HOLD SPECIMEN: NORMAL
IMM GRANULOCYTES # BLD: 0 10E3/UL
IMM GRANULOCYTES NFR BLD: 1 %
KETONES UR STRIP-MCNC: NEGATIVE MG/DL
LEUKOCYTE ESTERASE UR QL STRIP: NEGATIVE
LYMPHOCYTES # BLD AUTO: 2.3 10E3/UL (ref 0.8–5.3)
LYMPHOCYTES NFR BLD AUTO: 28 %
MCH RBC QN AUTO: 32.1 PG (ref 26.5–33)
MCHC RBC AUTO-ENTMCNC: 34.2 G/DL (ref 31.5–36.5)
MCV RBC AUTO: 94 FL (ref 78–100)
MONOCYTES # BLD AUTO: 0.6 10E3/UL (ref 0–1.3)
MONOCYTES NFR BLD AUTO: 7 %
MUCOUS THREADS #/AREA URNS LPF: PRESENT /LPF
NEUTROPHILS # BLD AUTO: 5 10E3/UL (ref 1.6–8.3)
NEUTROPHILS NFR BLD AUTO: 60 %
NITRATE UR QL: NEGATIVE
NRBC # BLD AUTO: 0 10E3/UL
NRBC BLD AUTO-RTO: 0 /100
PH UR STRIP: 6 [PH] (ref 5–7)
PLATELET # BLD AUTO: 263 10E3/UL (ref 150–450)
POTASSIUM BLD-SCNC: 3.8 MMOL/L (ref 3.4–5.3)
RBC # BLD AUTO: 5.2 10E6/UL (ref 4.4–5.9)
RBC URINE: >182 /HPF
SODIUM SERPL-SCNC: 140 MMOL/L (ref 133–144)
SP GR UR STRIP: 1.03 (ref 1–1.03)
SQUAMOUS EPITHELIAL: <1 /HPF
UROBILINOGEN UR STRIP-MCNC: NORMAL MG/DL
WBC # BLD AUTO: 8.2 10E3/UL (ref 4–11)
WBC URINE: 8 /HPF

## 2022-02-20 PROCEDURE — 96374 THER/PROPH/DIAG INJ IV PUSH: CPT

## 2022-02-20 PROCEDURE — 80048 BASIC METABOLIC PNL TOTAL CA: CPT | Performed by: PHYSICIAN ASSISTANT

## 2022-02-20 PROCEDURE — 74176 CT ABD & PELVIS W/O CONTRAST: CPT

## 2022-02-20 PROCEDURE — 81001 URINALYSIS AUTO W/SCOPE: CPT | Performed by: PHYSICIAN ASSISTANT

## 2022-02-20 PROCEDURE — 99285 EMERGENCY DEPT VISIT HI MDM: CPT | Mod: 25

## 2022-02-20 PROCEDURE — 99203 OFFICE O/P NEW LOW 30 MIN: CPT | Performed by: NURSE PRACTITIONER

## 2022-02-20 PROCEDURE — 85025 COMPLETE CBC W/AUTO DIFF WBC: CPT | Performed by: PHYSICIAN ASSISTANT

## 2022-02-20 PROCEDURE — 250N000011 HC RX IP 250 OP 636: Performed by: PHYSICIAN ASSISTANT

## 2022-02-20 PROCEDURE — 36415 COLL VENOUS BLD VENIPUNCTURE: CPT | Performed by: PHYSICIAN ASSISTANT

## 2022-02-20 RX ORDER — KETOROLAC TROMETHAMINE 15 MG/ML
15 INJECTION, SOLUTION INTRAMUSCULAR; INTRAVENOUS ONCE
Status: COMPLETED | OUTPATIENT
Start: 2022-02-20 | End: 2022-02-20

## 2022-02-20 RX ORDER — OXYCODONE HYDROCHLORIDE 5 MG/1
5 TABLET ORAL EVERY 6 HOURS PRN
Qty: 6 TABLET | Refills: 0 | Status: SHIPPED | OUTPATIENT
Start: 2022-02-20 | End: 2024-02-09

## 2022-02-20 RX ORDER — ONDANSETRON 4 MG/1
4 TABLET, ORALLY DISINTEGRATING ORAL EVERY 8 HOURS PRN
Qty: 10 TABLET | Refills: 0 | Status: SHIPPED | OUTPATIENT
Start: 2022-02-20 | End: 2022-02-23

## 2022-02-20 RX ADMIN — KETOROLAC TROMETHAMINE 15 MG: 15 INJECTION, SOLUTION INTRAMUSCULAR; INTRAVENOUS at 20:09

## 2022-02-20 ASSESSMENT — ENCOUNTER SYMPTOMS
CONSTIPATION: 0
FEVER: 0
COUGH: 0
DIARRHEA: 0
BACK PAIN: 1
SHORTNESS OF BREATH: 0

## 2022-02-21 NOTE — PATIENT INSTRUCTIONS
Go to emergency room for further evaluation of severe left low back pain, dysuria, toe numbness for 2 hours. Oxycodone 5 mg at 5pm didn't seem to help. Also chest tightness and throwing up blood

## 2022-02-21 NOTE — PROGRESS NOTES
Assessment & Plan     Acute left-sided low back pain without sciatica    Dysuria    Bilateral flank pain    Chest tightness    Numbness of toes       With severe back pain only mildly improved with oxycodone, toe numbness, chest tightness, bilateral flank pain, throwing up blood for months, discussed option for starting workup in clinic vs going to ED for further evaluation and recommend emergency room, as advanced imaging indicated. Patient agreeable and declines ambulance. He is discharged in stable condition.     Chikis Fang NP  [facility] [place]            Mario Pacheco is a 21 year old male who presents to clinic today with his friend for the following health issues:  Chief Complaint   Patient presents with     Back Pain     lower back, woke up from nap really couldnt move.     Back Pain    Onset of symptoms was 2 hour(s) ago.  Location: left low back  Radiation: does not radiate  Context: No known injury  Course of symptoms is improving mildly after taking oxycodone    Severity severe  Current and Associated symptoms: pain, dysuria, only able to void small amount, numbness in his toes, chest tightness  Denies: fecal incontinence, urinary incontinence, lower extremity weakness    Aggravating Factors: movement  Therapies to improve symptoms include: oxycodone he had left over helped temporarily 1.5 hours ago  Past history: recurrent self limited episodes of low back pain in the past  He was laying on the floor crying after waking up from his nap with severe back pain.   Also, he has been throwing up blood for the past couple months and has not been evaluated for this.   He drinks about a gallon of water daily, but less today.   No concern for STD  He has a history of pectus excavatum and had surgery for this in 2021.     Problem list, Medication list, Allergies, and Medical history reviewed in EPIC.    ROS:  Review of systems negative except for noted above        Objective    BP  128/76   Pulse 77   Temp 98  F (36.7  C) (Tympanic)   SpO2 98%   Physical Exam  Constitutional:       General: He is not in acute distress.     Appearance: He is not toxic-appearing or diaphoretic.   Abdominal:      General: Bowel sounds are normal. There is no distension.      Palpations: Abdomen is soft.      Tenderness: There is no abdominal tenderness. There is right CVA tenderness and left CVA tenderness. There is no guarding or rebound.   Musculoskeletal:      Comments: Decreased ROM lumbar flexion, extension, lateral flexion. Tenderness with palpation lumbar spine and bilateral lower back. Negative straight leg raise bilaterally   Skin:     General: Skin is warm and dry.      Findings: No bruising or erythema.   Neurological:      General: No focal deficit present.      Mental Status: He is alert and oriented to person, place, and time.      Cranial Nerves: No cranial nerve deficit.      Sensory: No sensory deficit.      Motor: No weakness.      Coordination: Coordination normal.      Gait: Gait normal.      Comments: Able to walk on toes, heels, straight line walk

## 2022-02-21 NOTE — ED NOTES
Pt resting calmly w/o complaint. Pt dcd with kidney stone kit, rx, and instructions. All questions answered.

## 2022-02-21 NOTE — ED PROVIDER NOTES
History   Chief Complaint:  Back Pain     The history is provided by the patient and medical records.      Julio Muñiz is a 21 year old male with history of pectus excavatum who presents with left lower back pain. The patient states he woke up from a nap at 1630 with non-radiating left lower back pain. He states he was unable to ambulate secondary to pain. He took 5 mg of oxycodone at approximately 1700 without improvement of symptoms. He noted some urinary urgency after onset of pain, and urinated 3 or 4 times. He went to urgent care at 1730 and was sent to the emergency department for further evaluation. He noted some numbness in his bilateral toes, which is a side effect of the oxycodone for him, however this has resolved here. He denies any shortness of breath, cough, new cardiac symptoms, fevers, constipation, diarrhea, penile pain, or testicular pain. He notes he has intermittently coughed up small amounts of blood for the past few months, however this has not occurred within the last month. He states he has a history of pectus excavatum repair on 10/09/2020, and has not had any new concerning symptoms related to the surgery. He endorses a paternal family history of kidney stones. He has taken ibuprofen for pain management.     Review of Systems   Constitutional: Negative for fever.   Respiratory: Negative for cough and shortness of breath.    Gastrointestinal: Negative for constipation and diarrhea.   Genitourinary: Positive for urgency. Negative for penile swelling and testicular pain.   Musculoskeletal: Positive for back pain (left lower back).   All other systems reviewed and are negative.    Allergies:  The patient has no known allergies.     Medications:  The patient is not currently taking any prescribed medications.    Past Medical History:     Pectus excavatum       Past Surgical History:    Adenoidectomy  Myringotomy, bilateral   Pectus excavatum repair (10/09/2020)  Tympanoplasty      Family  History:    Mother - Asthma     Social History:  The patient was unaccompanied to the emergency department.    Physical Exam     Patient Vitals for the past 24 hrs:   BP Temp Temp src Pulse Resp SpO2 Weight   02/20/22 1929 (!) 140/68 -- -- 85 -- -- --   02/20/22 1927 -- 98.2  F (36.8  C) Temporal -- 16 100 % 70.8 kg (156 lb)     Physical Exam  General: Well appearing, pleasant male, resting on exam bed  HEENT: No evidence of trauma.  Conjunctive are clear.  Extraocular eye movements intact.  Neck range of motion intact.  Nose and throat clear.  Respiratory: Good breath sounds bilaterally  Cardiovascular: Normal rate and rhythm   Gastrointestinal: Soft, nontender.    Musculoskeletal: Atraumatic. No spinal tenderness.  Skin: Exposed skin clear.  Neurologic: Alert.  Psych:  Patient is cooperative, with normal affect.    Emergency Department Course   Imaging:  Abd/pelvis CT no contrast - Stone Protocol   Final Result   IMPRESSION:    1.  There is a punctate calcification inseparable from the distal left ureter which could be within the ureter versus perhaps adjacent to the ureter representing a phlebolith. No significant hydronephrosis. Two additional tiny punctate densities in the    left kidney could represent nonobstructing tiny stones though too small to characterize definitively.         Report per radiology    Laboratory:  Labs Ordered and Resulted from Time of ED Arrival to Time of ED Departure   URINE MACROSCOPIC WITH REFLEX TO MICRO - Abnormal       Result Value    Color Urine Yellow      Appearance Urine Clear      Glucose Urine Negative      Bilirubin Urine Negative      Ketones Urine Negative      Specific Gravity Urine 1.028      Blood Urine Large (*)     pH Urine 6.0      Protein Albumin Urine 50  (*)     Urobilinogen Urine Normal      Nitrite Urine Negative      Leukocyte Esterase Urine Negative      RBC Urine >182 (*)     WBC Urine 8 (*)     Squamous Epithelials Urine <1      Mucus Urine Present (*)     BASIC METABOLIC PANEL - Abnormal    Sodium 140      Potassium 3.8      Chloride 104      Carbon Dioxide (CO2) 29      Anion Gap 7      Urea Nitrogen 14      Creatinine 1.11      Calcium 9.6      Glucose 114 (*)     GFR Estimate >90     CBC WITH PLATELETS AND DIFFERENTIAL    WBC Count 8.2      RBC Count 5.20      Hemoglobin 16.7      Hematocrit 48.9      MCV 94      MCH 32.1      MCHC 34.2      RDW 11.9      Platelet Count 263      % Neutrophils 60      % Lymphocytes 28      % Monocytes 7      % Eosinophils 3      % Basophils 1      % Immature Granulocytes 1      NRBCs per 100 WBC 0      Absolute Neutrophils 5.0      Absolute Lymphocytes 2.3      Absolute Monocytes 0.6      Absolute Eosinophils 0.2      Absolute Basophils 0.1      Absolute Immature Granulocytes 0.0      Absolute NRBCs 0.0        Emergency Department Course:     Reviewed:  I reviewed nursing notes, vitals, past medical history and Care Everywhere    Assessments:  1947 I obtained history and examined the patient as noted above.   2117 I rechecked the patient and explained CT findings.     Interventions:  2009 Toradol 15 mg IV    Disposition:  The patient was discharged to home.     Impression & Plan   Medical Decision Making:  Julio Muñiz is a 21 year old male with a history of pectus excavatum, presents emergency room today for evaluation of an acute onset of left low back pain in addition to urinary frequency and urgency. See HPI. His vitals are unremarkable. He has a reassuring exam and is in no acute distress. Urine shows blood and a few white cells without infection. CBC, BMP unremarkable. He went for stone run that reveals a tiny punctate calcification in his left ureter. It also reveals 2 other tiny calcifications in his left kidney. Clinically, with this on CT, sudden onset of flank pain, urinary symptoms, blood in his urine, he is likely suffering from ureterolithiasis. His pain is controlled with Toradol. We will manage him as an  outpatient and have him return with new or worsening symptoms, specifically those outlined in the discharge instructions. He is prescribed oxycodone should his pain return and be severe. He is educated about the risks. He is also provided Zofran should he become nauseous. He is provided a stone kit. He can follow-up with his primary or urology. There were other symptoms that the patient was complaining of at the urgent care that are chronic. He battles chronic chest tightness from his pectus excavatum surgery. Further, he noted to be coughing up blood 2 weeks ago that has since resolved. This was sporadic and minimal he explained. Further, he notes that he gets numbness in his toes bilaterally when he takes oxycodone. He took an oxycodone before he came given the pain. He only has a few left at home. He takes this for only emergencies. He again is educated about the risks. The numbness in his toes also has resolved. He has a ride home.    Diagnosis:    ICD-10-CM    1. Ureterolithiasis  N20.1    2. Hematuria, unspecified type  R31.9    3. Nephrolithiasis  N20.0    4. Bone island  M89.8X9      Discharge Medications:  Discharge Medication List as of 2/20/2022  9:29 PM      START taking these medications    Details   ondansetron (ZOFRAN ODT) 4 MG ODT tab Take 1 tablet (4 mg) by mouth every 8 hours as needed for nausea, Disp-10 tablet, R-0, E-Prescribe      oxyCODONE (ROXICODONE) 5 MG tablet Take 1 tablet (5 mg) by mouth every 6 hours as needed for pain, Disp-6 tablet, R-0, E-Prescribe           Scribe Disclosure:  I, Yovana Marina, am serving as a scribe at 7:34 PM on 2/20/2022 to document services personally performed by Chencho Franco PA-C based on my observations and the provider's statements to me.     Chencho Franco PA-C  02/20/22 3142

## 2022-06-04 ENCOUNTER — ANCILLARY PROCEDURE (OUTPATIENT)
Dept: GENERAL RADIOLOGY | Facility: CLINIC | Age: 22
End: 2022-06-04
Attending: INTERNAL MEDICINE
Payer: COMMERCIAL

## 2022-06-04 ENCOUNTER — OFFICE VISIT (OUTPATIENT)
Dept: URGENT CARE | Facility: URGENT CARE | Age: 22
End: 2022-06-04
Payer: COMMERCIAL

## 2022-06-04 VITALS
RESPIRATION RATE: 16 BRPM | OXYGEN SATURATION: 99 % | SYSTOLIC BLOOD PRESSURE: 153 MMHG | WEIGHT: 156 LBS | BODY MASS INDEX: 20.03 KG/M2 | DIASTOLIC BLOOD PRESSURE: 81 MMHG | TEMPERATURE: 98.8 F | HEART RATE: 95 BPM

## 2022-06-04 DIAGNOSIS — S99.912A ANKLE INJURY, LEFT, INITIAL ENCOUNTER: ICD-10-CM

## 2022-06-04 DIAGNOSIS — S99.912A ANKLE INJURY, LEFT, INITIAL ENCOUNTER: Primary | ICD-10-CM

## 2022-06-04 PROCEDURE — 99213 OFFICE O/P EST LOW 20 MIN: CPT | Performed by: INTERNAL MEDICINE

## 2022-06-04 PROCEDURE — 73610 X-RAY EXAM OF ANKLE: CPT | Mod: TC | Performed by: RADIOLOGY

## 2022-06-05 NOTE — PROGRESS NOTES
ASSESSMENT AND PLAN:      ICD-10-CM    1. Ankle injury, left, initial encounter  S99.912A XR Ankle Left G/E 3 Views     Ankle/Foot Bracing Supplies Order for DME - ONLY FOR DME   Discussed with patient he has a mild ankle sprain  Avoid strenuous activities on vacation.    PLAN:  MS Injury/Pain  ice, elevate, rest, splint: dispensed in clinic and Ibuprofen    Patient Instructions     Xray no fracture  Tie up ankle splint    RICE    ibuprofen     Recheck with ortho if ongoing concerns        Return if symptoms worsen or fail to improve.        Sophia Hawthorne MD  [facility]RG[facility]    Subjective     Julio Muñiz is a 21 year old who presents for Patient presents with:  Musculoskeletal Problem: Rolled left ankle last night    [facility]    MS Injury/Pain    Onset of symptoms was 1 day(s) ago.  Location: left ankle  Context:       The injury happened while helping a friend move.      Mechanism: He was carrying an end table down the stairs when he rolled his left ankle and subsequently fell  Continued to help friend move furniture on injured ankle      Patient experienced immediate pain, delayed swelling, was able to bear weight directly after injury  Course of symptoms is worsening.      Current and Associated symptoms: Pain and Swelling  Aggravating Factors: weight-bearing  Therapies to improve symptoms include: none    Flying to [place] tomorrow for family vacation        Objective    BP (!) 153/81   Pulse 95   Temp 98.8  F (37.1  C) (Tympanic)   Resp 16   Wt 70.8 kg (156 lb)   SpO2 99%   BMI 20.03 kg/m    Physical Exam  Vitals reviewed.   Constitutional:       Appearance: Normal appearance.   Musculoskeletal:      Comments: Exam of the injured ankle reveals swelling and tenderness over the lateral malleolus. No tenderness over the medial aspect of the ankle. The fifth metatarsal is not tender.   The rest of the foot, ankle and leg exam is normal.     Neurological:       Mental Status: He is alert.            Xray - Reviewed and interpreted by me.  No fracture noted

## 2022-06-05 NOTE — PATIENT INSTRUCTIONS
Xray no fracture  Tie up ankle splint    RICE    ibuprofen     Recheck with ortho if ongoing concerns

## 2022-10-03 DIAGNOSIS — Q67.6 PECTUS EXCAVATUM: Primary | ICD-10-CM

## 2022-11-01 NOTE — PROGRESS NOTES
THORACIC SURGERY FOLLOW UP VISIT     I saw Mr. Julio Muñiz in follow-up today. The clinical summary follows:     DIAGNOSIS   Severe pectus excavatum    PROCEDURES   5/26/21 planned retrosternal bar removal x 2.   10/9/20 modified Ravitch repair with retrosternal bar placement and sternal plating.      COMPLICATIONS  None     INTERVAL STUDIES  CT Chest 11/2/22: Intact hardware.            CT before surgery:         Past Medical History:   Diagnosis Date     Pectus excavatum      Past Surgical History:   Procedure Laterality Date     ADENOIDECTOMY       MYRINGOTOMY BILATERAL       REMOVE PORT VASCULAR ACCESS N/A 5/26/2021    Procedure: Chest wall hardware removal;  Surgeon: Maya Anaya MD;  Location: [facility] OR     REPAIR PECTUS EXCAVATUM N/A 10/09/2020    Procedure: Modified Ravitch Repair with sternal plating;  Surgeon: Maya Anaya MD;  Location: [facility] OR     TYMPANOPLASTY      harvest facial graft        Past Surgical History:   Procedure Laterality Date     ADENOIDECTOMY       MYRINGOTOMY BILATERAL       REMOVE PORT VASCULAR ACCESS N/A 5/26/2021    Procedure: Chest wall hardware removal;  Surgeon: Maya Anaya MD;  Location: [facility] OR     REPAIR PECTUS EXCAVATUM N/A 10/09/2020    Procedure: Modified Ravitch Repair with sternal plating;  Surgeon: Maya Anaya MD;  Location: [facility] OR     TYMPANOPLASTY      harvest facial graft      No Known Allergies        Current Outpatient Medications   Medication     acetaminophen (TYLENOL) 325 MG tablet     ibuprofen (ADVIL/MOTRIN) 600 MG tablet      No current facility-administered medications for this visit.          ETOH None   TOB None      Exam:   There were no vitals taken for this visit.  Alert and oriented.   Bilateral breath sounds.   Chest wound well healed. No clicking.         SUBJECTIVE  Junior comes to clinic  to discuss some concerns about his chest. He feels like the pectus has recurred. He does not have the pain he used to  have prior to his operation.  Also, his exercise tolerance is much better than preop.       From a personal perspective, he comes to clinic by himself.      IMPRESSION  20 year old male patient 2 years after modified Ravitch repair for extreme pectus excavatum.      PLAN  I spent 30 min on the date of the encounter in chart review, patient visit, review of tests, documentation and/or discussion with other providers about the issues documented above. I reviewed the plan as follows:    I reassured Junior about the benign findings of the CT scan.   Discharge from thoracic surgery clinic and follow up PRN.     All questions were answered and the patient and present family were in agreement with the plan.  I appreciate the opportunity to participate in the care of your patient and will keep you updated.    Sincerely,     Maya Bales MD

## 2022-11-02 ENCOUNTER — ANCILLARY PROCEDURE (OUTPATIENT)
Dept: CT IMAGING | Facility: CLINIC | Age: 22
End: 2022-11-02
Attending: CLINICAL NURSE SPECIALIST
Payer: COMMERCIAL

## 2022-11-02 ENCOUNTER — OFFICE VISIT (OUTPATIENT)
Dept: SURGERY | Facility: CLINIC | Age: 22
End: 2022-11-02
Attending: THORACIC SURGERY (CARDIOTHORACIC VASCULAR SURGERY)
Payer: COMMERCIAL

## 2022-11-02 VITALS
DIASTOLIC BLOOD PRESSURE: 79 MMHG | OXYGEN SATURATION: 97 % | SYSTOLIC BLOOD PRESSURE: 136 MMHG | HEART RATE: 72 BPM | WEIGHT: 169.2 LBS | RESPIRATION RATE: 16 BRPM | BODY MASS INDEX: 21.72 KG/M2

## 2022-11-02 DIAGNOSIS — Q67.6 PECTUS EXCAVATUM: Primary | ICD-10-CM

## 2022-11-02 DIAGNOSIS — Q67.6 PECTUS EXCAVATUM: ICD-10-CM

## 2022-11-02 PROCEDURE — 99213 OFFICE O/P EST LOW 20 MIN: CPT | Performed by: THORACIC SURGERY (CARDIOTHORACIC VASCULAR SURGERY)

## 2022-11-02 PROCEDURE — 71250 CT THORAX DX C-: CPT | Mod: GC | Performed by: RADIOLOGY

## 2022-11-02 PROCEDURE — G0463 HOSPITAL OUTPT CLINIC VISIT: HCPCS

## 2022-11-02 ASSESSMENT — PAIN SCALES - GENERAL: PAINLEVEL: SEVERE PAIN (6)

## 2022-11-02 NOTE — NURSING NOTE
"Oncology Rooming Note    November 2, 2022 4:16 PM   Julio Muñiz is a 21 year old male who presents for:    Chief Complaint   Patient presents with     Oncology Clinic Visit     RTN for Pectus excavatum      Initial Vitals: Blood Pressure 136/79   Pulse 72   Respiration 16   Weight 76.7 kg (169 lb 3.2 oz)   Oxygen Saturation 97%   Body Mass Index 21.72 kg/m   Estimated body mass index is 21.72 kg/m  as calculated from the following:    Height as of 5/26/21: 1.88 m (6' 2\").    Weight as of this encounter: 76.7 kg (169 lb 3.2 oz). Body surface area is 2 meters squared.  Severe Pain (6) Comment: Data Unavailable   No LMP for male patient.  Allergies reviewed: Yes  Medications reviewed: Yes    Medications: Medication refills not needed today.  Pharmacy name entered into EPIC:    [facility] - [place], [place] - [address]   [facility] [place], [place] - [address] [facility] [place], [place] - [address] [facility] [place], [place] [address] [facility] [phone]  [facility] #[id number] - [place] - [address]  AT [facility] #[id number]  [place] - [address] AT [address] [place] [address]    Clinical concerns: none       Carina Benoit MA            "

## 2022-11-02 NOTE — LETTER
11/2/2022         RE: Julio Muñiz  [address]  [facility] [address]        Dear Colleague,    Thank you for referring your patient, Julio Muñiz, to the [facility]. Please see a copy of my visit note below.    THORACIC SURGERY FOLLOW UP VISIT     I saw Mr. Julio Muñiz in follow-up today. The clinical summary follows:     DIAGNOSIS   Severe pectus excavatum    PROCEDURES   5/26/21 planned retrosternal bar removal x 2.   10/9/20 modified Ravitch repair with retrosternal bar placement and sternal plating.      COMPLICATIONS  None     INTERVAL STUDIES  CT Chest 11/2/22: Intact hardware.            CT before surgery:         Past Medical History:   Diagnosis Date     Pectus excavatum      Past Surgical History:   Procedure Laterality Date     ADENOIDECTOMY       MYRINGOTOMY BILATERAL       REMOVE PORT VASCULAR ACCESS N/A 5/26/2021    Procedure: Chest wall hardware removal;  Surgeon: Maya Anaya MD;  Location: [facility] OR     REPAIR PECTUS EXCAVATUM N/A 10/09/2020    Procedure: Modified Ravitch Repair with sternal plating;  Surgeon: Maya Anaya MD;  Location: [facility] OR     TYMPANOPLASTY      harvest facial graft        Past Surgical History:   Procedure Laterality Date     ADENOIDECTOMY       MYRINGOTOMY BILATERAL       REMOVE PORT VASCULAR ACCESS N/A 5/26/2021    Procedure: Chest wall hardware removal;  Surgeon: Maya Anaya MD;  Location: [facility] OR     REPAIR PECTUS EXCAVATUM N/A 10/09/2020    Procedure: Modified Ravitch Repair with sternal plating;  Surgeon: Maya Anaya MD;  Location: [facility] OR     TYMPANOPLASTY      harvest facial graft      No Known Allergies        Current Outpatient Medications   Medication     acetaminophen (TYLENOL) 325 MG tablet     ibuprofen (ADVIL/MOTRIN) 600 MG tablet      No current facility-administered medications for this visit.          ETOH None   TOB None      Exam:   There were no vitals taken for this  visit.  Alert and oriented.   Bilateral breath sounds.   Chest wound well healed. No clicking.         SUBJECTIVE  Junior comes to clinic  to discuss some concerns about his chest. He feels like the pectus has recurred. He does not have the pain he used to have prior to his operation.  Also, his exercise tolerance is much better than preop.       From a personal perspective, he comes to clinic by himself.      IMPRESSION  20 year old male patient 2 years after modified Ravitch repair for extreme pectus excavatum.      PLAN  I spent 30 min on the date of the encounter in chart review, patient visit, review of tests, documentation and/or discussion with other providers about the issues documented above. I reviewed the plan as follows:    I reassured Junior about the benign findings of the CT scan.   Discharge from thoracic surgery clinic and follow up PRN.     All questions were answered and the patient and present family were in agreement with the plan.  I appreciate the opportunity to participate in the care of your patient and will keep you updated.    Sincerely,     Maya Bales MD

## 2022-11-19 ENCOUNTER — HEALTH MAINTENANCE LETTER (OUTPATIENT)
Age: 22
End: 2022-11-19

## 2023-01-11 ENCOUNTER — OFFICE VISIT (OUTPATIENT)
Dept: DERMATOLOGY | Facility: CLINIC | Age: 23
End: 2023-01-11
Payer: COMMERCIAL

## 2023-01-11 DIAGNOSIS — B07.8 FLAT WART: Primary | ICD-10-CM

## 2023-01-11 PROCEDURE — 99202 OFFICE O/P NEW SF 15 MIN: CPT | Performed by: DERMATOLOGY

## 2023-01-11 ASSESSMENT — PAIN SCALES - GENERAL: PAINLEVEL: NO PAIN (0)

## 2023-01-11 NOTE — PROGRESS NOTES
Julio Muñiz is an extremely pleasant 22 year old year old male patient here today for spot son hands.   .   Patient states this has been present for a while.  Patient reports the following symptoms:  spreading.  Patient reports the following previous treatments cryo.  These treatments did not work.  Patient reports the following modifying factors none.  Associated symptoms: none.  Patient has no other skin complaints today.  Remainder of the HPI, Meds, PMH, Allergies, FH, and SH was reviewed in chart.      Past Medical History:   Diagnosis Date     Pectus excavatum        Past Surgical History:   Procedure Laterality Date     ADENOIDECTOMY       MYRINGOTOMY BILATERAL       REMOVE PORT VASCULAR ACCESS N/A 5/26/2021    Procedure: Chest wall hardware removal;  Surgeon: Maya Anaya MD;  Location: [facility] OR     REPAIR PECTUS EXCAVATUM N/A 10/09/2020    Procedure: Modified Ravitch Repair with sternal plating;  Surgeon: Maya Anaya MD;  Location: [facility] OR     TYMPANOPLASTY      harvest facial graft        Family History   Problem Relation Age of Onset     Asthma Mother        Social History     Socioeconomic History     Marital status: Single     Spouse name: Not on file     Number of children: Not on file     Years of education: Not on file     Highest education level: Not on file   Occupational History     Not on file   Tobacco Use     Smoking status: Never     Smokeless tobacco: Never   Substance and Sexual Activity     Alcohol use: Not Currently     Comment: none over one month     Drug use: No     Sexual activity: Not on file   Other Topics Concern     Not on file   Social History Narrative     Not on file     Social Determinants of Health     Financial Resource Strain: Not on file   Food Insecurity: Not on file   Transportation Needs: Not on file   Physical Activity: Not on file   Stress: Not on file   Social Connections: Not on file   Intimate Partner Violence: Not on file   Housing  Stability: Not on file       Outpatient Encounter Medications as of 1/11/2023   Medication Sig Dispense Refill     acetaminophen (TYLENOL) 325 MG tablet Take 3 tablets (975 mg) by mouth every 6 hours (Patient not taking: Reported on 11/2/2022)       ibuprofen (ADVIL/MOTRIN) 600 MG tablet Take 1 tablet (600 mg) by mouth every 6 hours (Patient not taking: Reported on 11/2/2022)       oxyCODONE (ROXICODONE) 5 MG tablet Take 1 tablet (5 mg) by mouth every 6 hours as needed for pain (Patient not taking: Reported on 6/4/2022) 6 tablet 0     No facility-administered encounter medications on file as of 1/11/2023.             O:   NAD, WDWN, Alert & Oriented, Mood & Affect wnl, Vitals stable   Here today alone    General appearance normal   Vitals stable   Alert, oriented and in no acute distress     Flat papules with loss of skin lines on bl hands      Eyes: Conjunctivae/lids:Normal     ENT: Lips, buccal mucosa, tongue: normal    MSK:Normal    Cardiovascular: peripheral edema none    Pulm: Breathing Normal    Neuro/Psych: Orientation:Alert and Orientedx3 ; Mood/Affect:normal       A/P:  1. Flat warts  Pathophysiology discussed with pateint   Candin, cryo, destruction, topiclas discussed with patient   Send cimetidine / sal acid/ 5 Follow-up cream to use daily   It was a pleasure speaking to Julio Muñiz today.  Previous clinic notes and pertinent laboratory tests were reviewed prior to Julio Muñiz's visit.  Return to clinic 3 months

## 2023-01-11 NOTE — LETTER
1/11/2023         RE: Julio Muñiz  [address]   [place] [address]        Dear Colleague,    Thank you for referring your patient, Julio Muñiz, to the [facility]. Please see a copy of my visit note below.    Julio Muñiz is an extremely pleasant 22 year old year old male patient here today for spot son hands.   .   Patient states this has been present for a while.  Patient reports the following symptoms:  spreading.  Patient reports the following previous treatments cryo.  These treatments did not work.  Patient reports the following modifying factors none.  Associated symptoms: none.  Patient has no other skin complaints today.  Remainder of the HPI, Meds, PMH, Allergies, FH, and SH was reviewed in chart.      Past Medical History:   Diagnosis Date     Pectus excavatum        Past Surgical History:   Procedure Laterality Date     ADENOIDECTOMY       MYRINGOTOMY BILATERAL       REMOVE PORT VASCULAR ACCESS N/A 5/26/2021    Procedure: Chest wall hardware removal;  Surgeon: Maya Anaya MD;  Location: [facility] OR     REPAIR PECTUS EXCAVATUM N/A 10/09/2020    Procedure: Modified Ravitch Repair with sternal plating;  Surgeon: Maya Anaya MD;  Location: [facility] OR     TYMPANOPLASTY      harvest facial graft        Family History   Problem Relation Age of Onset     Asthma Mother        Social History     Socioeconomic History     Marital status: Single     Spouse name: Not on file     Number of children: Not on file     Years of education: Not on file     Highest education level: Not on file   Occupational History     Not on file   Tobacco Use     Smoking status: Never     Smokeless tobacco: Never   Substance and Sexual Activity     Alcohol use: Not Currently     Comment: none over one month     Drug use: No     Sexual activity: Not on file   Other Topics Concern     Not on file   Social History Narrative     Not on file     Social Determinants of Health      Financial Resource Strain: Not on file   Food Insecurity: Not on file   Transportation Needs: Not on file   Physical Activity: Not on file   Stress: Not on file   Social Connections: Not on file   Intimate Partner Violence: Not on file   Housing Stability: Not on file       Outpatient Encounter Medications as of 1/11/2023   Medication Sig Dispense Refill     acetaminophen (TYLENOL) 325 MG tablet Take 3 tablets (975 mg) by mouth every 6 hours (Patient not taking: Reported on 11/2/2022)       ibuprofen (ADVIL/MOTRIN) 600 MG tablet Take 1 tablet (600 mg) by mouth every 6 hours (Patient not taking: Reported on 11/2/2022)       oxyCODONE (ROXICODONE) 5 MG tablet Take 1 tablet (5 mg) by mouth every 6 hours as needed for pain (Patient not taking: Reported on 6/4/2022) 6 tablet 0     No facility-administered encounter medications on file as of 1/11/2023.             O:   NAD, WDWN, Alert & Oriented, Mood & Affect wnl, Vitals stable   Here today alone    General appearance normal   Vitals stable   Alert, oriented and in no acute distress     Flat papules with loss of skin lines on bl hands      Eyes: Conjunctivae/lids:Normal     ENT: Lips, buccal mucosa, tongue: normal    MSK:Normal    Cardiovascular: peripheral edema none    Pulm: Breathing Normal    Neuro/Psych: Orientation:Alert and Orientedx3 ; Mood/Affect:normal       A/P:  1. Flat warts  Pathophysiology discussed with pateint   Candin, cryo, destruction, topiclas discussed with patient   Send cimetidine / sal acid/ 5 Follow-up cream to use daily   It was a pleasure speaking to Julio Muñiz today.  Previous clinic notes and pertinent laboratory tests were reviewed prior to Julio Muñiz's visit.  Return to clinic 3 months        Again, thank you for allowing me to participate in the care of your patient.        Sincerely,        Elan Peters MD

## 2023-04-09 ENCOUNTER — HEALTH MAINTENANCE LETTER (OUTPATIENT)
Age: 23
End: 2023-04-09

## 2024-02-09 ENCOUNTER — TELEPHONE (OUTPATIENT)
Dept: FAMILY MEDICINE | Facility: OTHER | Age: 24
End: 2024-02-09

## 2024-02-09 ENCOUNTER — OFFICE VISIT (OUTPATIENT)
Dept: FAMILY MEDICINE | Facility: OTHER | Age: 24
End: 2024-02-09
Attending: NURSE PRACTITIONER
Payer: COMMERCIAL

## 2024-02-09 VITALS
BODY MASS INDEX: 24.91 KG/M2 | SYSTOLIC BLOOD PRESSURE: 116 MMHG | WEIGHT: 194 LBS | HEART RATE: 61 BPM | TEMPERATURE: 98 F | DIASTOLIC BLOOD PRESSURE: 78 MMHG | OXYGEN SATURATION: 98 %

## 2024-02-09 DIAGNOSIS — L30.9 ECZEMA, UNSPECIFIED TYPE: Primary | ICD-10-CM

## 2024-02-09 PROCEDURE — 99213 OFFICE O/P EST LOW 20 MIN: CPT | Performed by: NURSE PRACTITIONER

## 2024-02-09 RX ORDER — TRIAMCINOLONE ACETONIDE 1 MG/G
CREAM TOPICAL 2 TIMES DAILY
Qty: 80 G | Refills: 1 | Status: SHIPPED | OUTPATIENT
Start: 2024-02-09 | End: 2024-03-13

## 2024-02-09 ASSESSMENT — PAIN SCALES - GENERAL: PAINLEVEL: NO PAIN (0)

## 2024-02-09 NOTE — TELEPHONE ENCOUNTER
8:07 AM    Reason for Call: OVERBOOK    Patient is having the following symptoms: Dry patch on hand for 1 year .    The patient is requesting an appointment for Today with his brother Chencho with [facility].    Was an appointment offered for this call? Yes  If yes : Appointment type Short              Date    Preferred method for responding to this message: Telephone Call  What is your phone number ?  [phone]     If we cannot reach you directly, may we leave a detailed response at the number you provided? Yes    Can this message wait until your PCP/provider returns, if unavailable today? Not applicable    Bozena Davis

## 2024-02-09 NOTE — PROGRESS NOTES
Assessment & Plan     (L30.9) Eczema, unspecified type  (primary encounter diagnosis)  Comment: treat with Kenalog. Supportive care discussed. Use a thick moisturizing lotion on hands   Plan: triamcinolone (KENALOG) 0.1 % external cream            See Patient Instructions    Return if symptoms worsen or fail to improve.    Mario Pacheco is a 23 year old, presenting for the following health issues:  Derm Problem    HPI     Rash  Onset/Duration: over 1 year   Description  Location: right hand   Character: flakey, red  Itching: mild  Intensity:  moderate  Progression of Symptoms:  improving  Accompanying signs and symptoms:   Fever: No  Body aches or joint pain: No  Sore throat symptoms: No  Recent cold symptoms: No  History:           Previous episodes of similar rash: yes-saw dermatology   New exposures:  None  Recent travel: No  Exposure to similar rash: No  Precipitating or alleviating factors: none   Therapies tried and outcome: OTC lotion, compounded med for warts from dermatology that wasn't effective    No new hand washing products.  No new gloves.       Review of Systems  Constitutional, HEENT, cardiovascular, pulmonary, gi and gu systems are negative, except as otherwise noted.      Objective    /78   Pulse 61   Temp 98  F (36.7  C) (Tympanic)   Wt 88 kg (194 lb)   SpO2 98%   BMI 24.91 kg/m    Body mass index is 24.91 kg/m .  Physical Exam   GENERAL: alert and no distress  SKIN: no suspicious lesions. +dry scaly non raised rash to right palm without drainage, erythema, bruising, or warmth to the touch   PSYCH: mentation appears normal, affect normal/bright        Signed Electronically by: ROGER Knight CNP

## 2024-06-15 ENCOUNTER — HEALTH MAINTENANCE LETTER (OUTPATIENT)
Age: 24
End: 2024-06-15

## 2024-06-28 ENCOUNTER — OFFICE VISIT (OUTPATIENT)
Dept: URGENT CARE | Facility: URGENT CARE | Age: 24
End: 2024-06-28
Payer: COMMERCIAL

## 2024-06-28 VITALS
DIASTOLIC BLOOD PRESSURE: 83 MMHG | HEART RATE: 76 BPM | BODY MASS INDEX: 24.65 KG/M2 | OXYGEN SATURATION: 97 % | RESPIRATION RATE: 21 BRPM | WEIGHT: 192 LBS | SYSTOLIC BLOOD PRESSURE: 129 MMHG | TEMPERATURE: 97.9 F

## 2024-06-28 DIAGNOSIS — R53.83 FATIGUE, UNSPECIFIED TYPE: Primary | ICD-10-CM

## 2024-06-28 LAB
BASOPHILS # BLD AUTO: 0.1 10E3/UL (ref 0–0.2)
BASOPHILS NFR BLD AUTO: 1 %
EOSINOPHIL # BLD AUTO: 0.3 10E3/UL (ref 0–0.7)
EOSINOPHIL NFR BLD AUTO: 5 %
ERYTHROCYTE [DISTWIDTH] IN BLOOD BY AUTOMATED COUNT: 12 % (ref 10–15)
HCT VFR BLD AUTO: 51.5 % (ref 40–53)
HGB BLD-MCNC: 17.6 G/DL (ref 13.3–17.7)
IMM GRANULOCYTES # BLD: 0 10E3/UL
IMM GRANULOCYTES NFR BLD: 0 %
LYMPHOCYTES # BLD AUTO: 2.3 10E3/UL (ref 0.8–5.3)
LYMPHOCYTES NFR BLD AUTO: 42 %
MCH RBC QN AUTO: 31.7 PG (ref 26.5–33)
MCHC RBC AUTO-ENTMCNC: 34.2 G/DL (ref 31.5–36.5)
MCV RBC AUTO: 93 FL (ref 78–100)
MONOCYTES # BLD AUTO: 0.5 10E3/UL (ref 0–1.3)
MONOCYTES NFR BLD AUTO: 9 %
MONOCYTES NFR BLD AUTO: NEGATIVE %
NEUTROPHILS # BLD AUTO: 2.3 10E3/UL (ref 1.6–8.3)
NEUTROPHILS NFR BLD AUTO: 43 %
PLATELET # BLD AUTO: 266 10E3/UL (ref 150–450)
RBC # BLD AUTO: 5.56 10E6/UL (ref 4.4–5.9)
TSH SERPL DL<=0.005 MIU/L-ACNC: 1.48 UIU/ML (ref 0.3–4.2)
WBC # BLD AUTO: 5.4 10E3/UL (ref 4–11)

## 2024-06-28 PROCEDURE — 86308 HETEROPHILE ANTIBODY SCREEN: CPT | Performed by: PHYSICIAN ASSISTANT

## 2024-06-28 PROCEDURE — 99213 OFFICE O/P EST LOW 20 MIN: CPT | Performed by: PHYSICIAN ASSISTANT

## 2024-06-28 PROCEDURE — 36415 COLL VENOUS BLD VENIPUNCTURE: CPT | Performed by: PHYSICIAN ASSISTANT

## 2024-06-28 PROCEDURE — 85025 COMPLETE CBC W/AUTO DIFF WBC: CPT | Performed by: PHYSICIAN ASSISTANT

## 2024-06-28 PROCEDURE — 84443 ASSAY THYROID STIM HORMONE: CPT | Performed by: PHYSICIAN ASSISTANT

## 2024-06-28 ASSESSMENT — ENCOUNTER SYMPTOMS
MYALGIAS: 1
HEADACHES: 1
APPETITE CHANGE: 1
SHORTNESS OF BREATH: 0
FATIGUE: 1
EYES NEGATIVE: 1
SORE THROAT: 1
RHINORRHEA: 0
COUGH: 1
FEVER: 0

## 2024-06-28 NOTE — PROGRESS NOTES
SUBJECTIVE:   Julio Muñiz is a 23 year old male presenting with a chief complaint of   Chief Complaint   Patient presents with    Urgent Care     Fatigue, dizziness, lightheadedness, no appetite, runny nose and congestion for 1+ weeks.   (Declined strep/flu/covid testing).        He is an established patient of [facility].  Patient presents with fatigue, decreased appetite x 9 days.  Per patient, his mom is a nurse and believes it is his thyroid. No known thyroid problems, no hx of mono.  No fevers.  Yellow productive cough, small. Small ST.    Treatment:  tylenol and ibuprofen - last took last night     Review of Systems   Constitutional:  Positive for appetite change and fatigue. Negative for fever.   HENT:  Positive for sore throat. Negative for congestion, ear pain and rhinorrhea.    Eyes: Negative.    Respiratory:  Positive for cough. Negative for shortness of breath.    Genitourinary: Negative.    Musculoskeletal:  Positive for myalgias.   Skin:  Negative for rash.   Neurological:  Positive for headaches.   All other systems reviewed and are negative.      Past Medical History:   Diagnosis Date    Pectus excavatum      Family History   Problem Relation Age of Onset    Asthma Mother      Current Outpatient Medications   Medication Sig Dispense Refill    acetaminophen (TYLENOL) 325 MG tablet Take 3 tablets (975 mg) by mouth every 6 hours      ibuprofen (ADVIL/MOTRIN) 600 MG tablet Take 1 tablet (600 mg) by mouth every 6 hours      triamcinolone (KENALOG) 0.1 % external cream Apply topically 2 times daily Use for 14 days. (Patient not taking: Reported on 6/28/2024) 80 g 1     Social History     Tobacco Use    Smoking status: Never    Smokeless tobacco: Never   Substance Use Topics    Alcohol use: Not Currently     Comment: none over one month       OBJECTIVE  /83   Pulse 76   Temp 97.9  F (36.6  C) (Tympanic)   Resp 21   Wt 87.1 kg (192 lb)   SpO2 97%   BMI 24.65 kg/m      Physical Exam  Vitals and  nursing note reviewed.   Constitutional:       General: He is not in acute distress.     Appearance: Normal appearance. He is normal weight. He is not ill-appearing, toxic-appearing or diaphoretic.   HENT:      Head: Normocephalic and atraumatic.      Right Ear: Tympanic membrane, ear canal and external ear normal.      Left Ear: Tympanic membrane, ear canal and external ear normal.      Nose: Nose normal.      Mouth/Throat:      Mouth: Mucous membranes are moist.      Pharynx: Oropharynx is clear.   Eyes:      Extraocular Movements: Extraocular movements intact.      Conjunctiva/sclera: Conjunctivae normal.   Cardiovascular:      Rate and Rhythm: Normal rate and regular rhythm.      Pulses: Normal pulses.      Heart sounds: Normal heart sounds.   Pulmonary:      Effort: Pulmonary effort is normal.      Breath sounds: Normal breath sounds.   Musculoskeletal:      Cervical back: Normal range of motion and neck supple. No rigidity. No muscular tenderness.   Skin:     General: Skin is warm and dry.      Findings: No rash.   Neurological:      General: No focal deficit present.      Mental Status: He is alert.   Psychiatric:         Mood and Affect: Mood normal.         Behavior: Behavior normal.         Labs:  Results for orders placed or performed in visit on 06/28/24 (from the past 24 hour(s))   CBC with platelets and differential    Narrative    The following orders were created for panel order CBC with platelets and differential.  Procedure                               Abnormality         Status                     ---------                               -----------         ------                     CBC with platelets and d...[[id number]]                      In process                   Please view results for these tests on the individual orders.         ASSESSMENT:      ICD-10-CM    1. Fatigue, unspecified type  R53.83 CBC with platelets and differential     TSH     Mononucleosis screen     CBC with platelets  and differential     TSH     Mononucleosis screen           Medical Decision Making:    Differential Diagnosis:  URI, less likely - hypothyroidism, mono, depression, anemia    Serious Comorbid Conditions:  Adult:   reviewed    PLAN:  Fatigue etiology unclear.  TSH pending.  Symptomatic care.  Tylenol/motrin prn.  He has no red flag signs or symptoms.  Exam has no findings, normal vitals. Highly recommend he follow up with PCP if symptoms persist or if TSH is abnormal.      Followup:    If not improving or if condition worsens, follow up with your Primary Care Provider, If not improving or if conditions worsens over the next 12-24 hours, go to the Emergency Department    There are no Patient Instructions on file for this visit.

## 2024-09-13 ENCOUNTER — OFFICE VISIT (OUTPATIENT)
Dept: FAMILY MEDICINE | Facility: OTHER | Age: 24
End: 2024-09-13
Attending: NURSE PRACTITIONER
Payer: COMMERCIAL

## 2024-09-13 ENCOUNTER — ANCILLARY PROCEDURE (OUTPATIENT)
Dept: GENERAL RADIOLOGY | Facility: OTHER | Age: 24
End: 2024-09-13
Attending: NURSE PRACTITIONER
Payer: COMMERCIAL

## 2024-09-13 VITALS
RESPIRATION RATE: 20 BRPM | SYSTOLIC BLOOD PRESSURE: 124 MMHG | HEART RATE: 75 BPM | HEIGHT: 74 IN | BODY MASS INDEX: 25.03 KG/M2 | TEMPERATURE: 98 F | DIASTOLIC BLOOD PRESSURE: 72 MMHG | OXYGEN SATURATION: 97 % | WEIGHT: 195 LBS

## 2024-09-13 DIAGNOSIS — M62.838 MUSCLE SPASM: ICD-10-CM

## 2024-09-13 DIAGNOSIS — B36.9 FUNGAL RASH OF TRUNK: Primary | ICD-10-CM

## 2024-09-13 DIAGNOSIS — R07.89 CHEST WALL PAIN: ICD-10-CM

## 2024-09-13 DIAGNOSIS — R21 RASH OF HAND: ICD-10-CM

## 2024-09-13 DIAGNOSIS — R53.83 OTHER FATIGUE: ICD-10-CM

## 2024-09-13 LAB
ERYTHROCYTE [DISTWIDTH] IN BLOOD BY AUTOMATED COUNT: 12.1 % (ref 10–15)
HCT VFR BLD AUTO: 48.1 % (ref 40–53)
HGB BLD-MCNC: 16.9 G/DL (ref 13.3–17.7)
MCH RBC QN AUTO: 32.2 PG (ref 26.5–33)
MCHC RBC AUTO-ENTMCNC: 35.1 G/DL (ref 31.5–36.5)
MCV RBC AUTO: 92 FL (ref 78–100)
PLATELET # BLD AUTO: 243 10E3/UL (ref 150–450)
RBC # BLD AUTO: 5.25 10E6/UL (ref 4.4–5.9)
WBC # BLD AUTO: 5.8 10E3/UL (ref 4–11)

## 2024-09-13 PROCEDURE — G2211 COMPLEX E/M VISIT ADD ON: HCPCS | Performed by: NURSE PRACTITIONER

## 2024-09-13 PROCEDURE — 82607 VITAMIN B-12: CPT | Performed by: NURSE PRACTITIONER

## 2024-09-13 PROCEDURE — 80053 COMPREHEN METABOLIC PANEL: CPT | Performed by: NURSE PRACTITIONER

## 2024-09-13 PROCEDURE — 82306 VITAMIN D 25 HYDROXY: CPT | Performed by: NURSE PRACTITIONER

## 2024-09-13 PROCEDURE — 71046 X-RAY EXAM CHEST 2 VIEWS: CPT | Mod: TC | Performed by: RADIOLOGY

## 2024-09-13 PROCEDURE — 99214 OFFICE O/P EST MOD 30 MIN: CPT | Performed by: NURSE PRACTITIONER

## 2024-09-13 PROCEDURE — 85027 COMPLETE CBC AUTOMATED: CPT | Performed by: NURSE PRACTITIONER

## 2024-09-13 PROCEDURE — 82746 ASSAY OF FOLIC ACID SERUM: CPT | Performed by: NURSE PRACTITIONER

## 2024-09-13 PROCEDURE — 36415 COLL VENOUS BLD VENIPUNCTURE: CPT | Performed by: NURSE PRACTITIONER

## 2024-09-13 RX ORDER — AMMONIUM LACTATE 12 G/100G
CREAM TOPICAL 2 TIMES DAILY
Qty: 385 G | Refills: 0 | Status: SHIPPED | OUTPATIENT
Start: 2024-09-13

## 2024-09-13 RX ORDER — CYCLOBENZAPRINE HCL 10 MG
10 TABLET ORAL
Qty: 30 TABLET | Refills: 0 | Status: SHIPPED | OUTPATIENT
Start: 2024-09-13

## 2024-09-13 RX ORDER — CLOTRIMAZOLE 1 %
CREAM (GRAM) TOPICAL 2 TIMES DAILY
Qty: 113 G | Refills: 0 | Status: SHIPPED | OUTPATIENT
Start: 2024-09-13

## 2024-09-13 ASSESSMENT — PAIN SCALES - GENERAL: PAINLEVEL: MILD PAIN (3)

## 2024-09-13 NOTE — PROGRESS NOTES
"  Assessment & Plan       (B36.9) Fungal rash of trunk  (primary encounter diagnosis)  Comment: He's been using Clotrimazole cream, but is running out. Cream has been helping his rash  Plan: clotrimazole (LOTRIMIN) 1 % external cream            (R21) Rash of hand  Comment: treat with amlactin cream   Plan: ammonium lactate (AMLACTIN) 12 % external cream            (M62.838) Muscle spasm  Comment: treat with Flexeril. Discussed sedative side effects. He will take at night which will also help him sleep   Plan: cyclobenzaprine (FLEXERIL) 10 MG tablet            (R07.89) Chest wall pain  Comment: check chest XRAY. Pain is reproducible on exam   Plan: XR CHEST 2 VW (Clinic Performed)            (R53.83) Other fatigue  Comment: check labs   Plan: Vitamin B12, Folate, Vitamin D Deficiency,         Comprehensive metabolic panel (BMP + Alb, Alk         Phos, ALT, AST, Total. Bili, TP), CBC with         platelets                BMI  Estimated body mass index is 25.04 kg/m  as calculated from the following:    Height as of this encounter: 1.88 m (6' 2\").    Weight as of this encounter: 88.5 kg (195 lb).         See Patient Instructions    Return if symptoms worsen or fail to improve.    Mario Pacheco is a 23 year old, presenting for the following health issues:  Shoulder Pain        9/13/2024     2:30 PM   Additional Questions   Roomed by Brooklyn KELLY     History of Present Illness       Reason for visit:  Sharp pain in shoulder  Symptom onset:  More than a month  Symptoms include:  Sharp pain in shoulders  Symptom intensity:  Mild  Symptom progression:  Staying the same  Had these symptoms before:  No  What makes it better:  Tylenol   He is taking medications regularly.       Shoulder pain  Onset: 4-6 weeks  Description: sharp pain in shoulder  Intensity: severe  Progression of Symptoms:  same and intermittent  Accompanying Signs & Symptoms: radiates into the chest and down arm  Previous history of similar problem: no, no " "trauma to shoulders  Precipitating factors:        Worsened by: nothing triggers it but it spasms more towards the end of the day  Alleviating factors:        Improved by: tylenol  Therapies tried and outcome: tylenol    Sleep issues  Onset: since June  Description:   Patient is oversleeping sometimes sleeps from the time he gets home until the next morning.  Progression of Symptoms:  worsening  Therapies Tried and outcome: supplements- think they may be helping a little bit     Rash  Onset: x 1 year for hand, back x couple of months ago  Description:   Location: hand, back  Character: flakey  Itching (Pruritis): YES  Progression of Symptoms:  worsening  Accompanying Signs & Symptoms:  Fever: no   Body aches or joint pain: YES- shoulder  Sore throat symptoms: no   Recent cold symptoms: no   History:   Previous similar rash: no   Precipitating factors:   Exposure to similar rash: no   New exposures: None   Recent travel: no   Therapies Tried and outcome: antifungal cream, lotion, fragrance free soaps         Review of Systems  Constitutional, HEENT, cardiovascular, pulmonary, GI, , musculoskeletal, neuro, skin, endocrine and psych systems are negative, except as otherwise noted.      Objective    /72 (BP Location: Right arm, Patient Position: Sitting, Cuff Size: Adult Regular)   Pulse 75   Temp 98  F (36.7  C) (Tympanic)   Resp 20   Ht 1.88 m (6' 2\")   Wt 88.5 kg (195 lb)   SpO2 97%   BMI 25.04 kg/m    Body mass index is 25.04 kg/m .  Physical Exam   GENERAL: alert and no distress  NECK: no adenopathy, no asymmetry, masses, or scars  RESP: lungs clear to auscultation - no rales, rhonchi or wheezes  CV: regular rate and rhythm, normal S1 S2, no S3 or S4, no murmur, click or rub, no peripheral edema  MS: no gross musculoskeletal defects noted, no edema. +TTP to upper breast bone on left side (no rash, erythema, bruising, or warmth to the touch to chest wall)  SKIN: no suspicious lesions or rashes. +dry " rash on right palm. +coccyx region with erythema and dry rash without warmth to the touch   NEURO: Normal strength and tone, mentation intact and speech normal  PSYCH: mentation appears normal, affect normal/bright      ROGER Knight CNP

## 2024-09-16 LAB
ALBUMIN SERPL BCG-MCNC: 4.9 G/DL (ref 3.5–5.2)
ALP SERPL-CCNC: 92 U/L (ref 40–150)
ALT SERPL W P-5'-P-CCNC: 30 U/L (ref 0–70)
ANION GAP SERPL CALCULATED.3IONS-SCNC: 12 MMOL/L (ref 7–15)
AST SERPL W P-5'-P-CCNC: 22 U/L (ref 0–45)
BILIRUB SERPL-MCNC: 0.7 MG/DL
BUN SERPL-MCNC: 13.6 MG/DL (ref 6–20)
CALCIUM SERPL-MCNC: 9.8 MG/DL (ref 8.8–10.4)
CHLORIDE SERPL-SCNC: 102 MMOL/L (ref 98–107)
CREAT SERPL-MCNC: 1.22 MG/DL (ref 0.67–1.17)
EGFRCR SERPLBLD CKD-EPI 2021: 84 ML/MIN/1.73M2
FOLATE SERPL-MCNC: 10.2 NG/ML (ref 4.6–34.8)
GLUCOSE SERPL-MCNC: 78 MG/DL (ref 70–99)
HCO3 SERPL-SCNC: 27 MMOL/L (ref 22–29)
POTASSIUM SERPL-SCNC: 3.9 MMOL/L (ref 3.4–5.3)
PROT SERPL-MCNC: 7.7 G/DL (ref 6.4–8.3)
SODIUM SERPL-SCNC: 141 MMOL/L (ref 135–145)
VIT D+METAB SERPL-MCNC: 26 NG/ML (ref 20–50)

## 2024-09-18 LAB — VIT B12 SERPL-MCNC: 243 PG/ML (ref 232–1245)

## 2024-10-22 ENCOUNTER — MYC REFILL (OUTPATIENT)
Dept: FAMILY MEDICINE | Facility: OTHER | Age: 24
End: 2024-10-22

## 2024-10-22 DIAGNOSIS — B36.9 FUNGAL RASH OF TRUNK: ICD-10-CM

## 2024-10-22 DIAGNOSIS — R21 RASH OF HAND: ICD-10-CM

## 2024-10-22 DIAGNOSIS — M62.838 MUSCLE SPASM: ICD-10-CM

## 2024-10-23 NOTE — TELEPHONE ENCOUNTER
Lotrimin  Last Written Prescription Date: 9/13/24  Last Fill Quantity: 113 g # of Refills: 0  Last Office Visit: 9/13/24    Amlactin  Last Written Prescription Date: 9/13/24  Last Fill Quantity: 385 g # of Refills: 0  Last Office Visit: 9/13/24    Flexeril  Last Written Prescription Date: 9/13/24  Last Fill Quantity: 30 # of Refills: 0  Last Office Visit: 9/13/24

## 2024-10-24 RX ORDER — AMMONIUM LACTATE 12 G/100G
CREAM TOPICAL 2 TIMES DAILY
Qty: 385 G | Refills: 0 | Status: SHIPPED | OUTPATIENT
Start: 2024-10-24

## 2024-10-24 RX ORDER — CYCLOBENZAPRINE HCL 10 MG
10 TABLET ORAL
Qty: 30 TABLET | Refills: 0 | Status: SHIPPED | OUTPATIENT
Start: 2024-10-24

## 2024-10-24 RX ORDER — CLOTRIMAZOLE 1 %
CREAM (GRAM) TOPICAL 2 TIMES DAILY
Qty: 113 G | Refills: 0 | Status: SHIPPED | OUTPATIENT
Start: 2024-10-24

## 2024-10-24 NOTE — TELEPHONE ENCOUNTER
clotrimazole (LOTRIMIN) 1 % external cream       Routing refill request to provider for review/approval because:    Antifungal Agents Vywicc66/22/2024 09:18 PM   Protocol Details Always Fail Criteria            ammonium lactate (AMLACTIN) 12 % external cream     Routing refill request to provider for review/approval because:  Miscellaneous Dermatologic Agents Qauift43/22/2024 09:18 PM   Protocol Details Medication indicated for associated diagnosis         cyclobenzaprine (FLEXERIL) 10 MG tablet   Routing refill request to provider for review/approval because:  Drug not on the [facility] or [facility] refill protocol or controlled substance

## 2024-12-06 DIAGNOSIS — M62.838 MUSCLE SPASM: ICD-10-CM

## 2024-12-06 DIAGNOSIS — B36.9 FUNGAL RASH OF TRUNK: ICD-10-CM

## 2024-12-09 RX ORDER — CLOTRIMAZOLE 1 %
CREAM (GRAM) TOPICAL 2 TIMES DAILY
Qty: 90 G | Refills: 2 | Status: SHIPPED | OUTPATIENT
Start: 2024-12-09

## 2024-12-09 RX ORDER — CYCLOBENZAPRINE HCL 10 MG
10 TABLET ORAL
Qty: 30 TABLET | Refills: 0 | Status: SHIPPED | OUTPATIENT
Start: 2024-12-09

## 2024-12-09 NOTE — TELEPHONE ENCOUNTER
CLOTRIMAZOLE 1% CREAM 30GM     Antifungal Agents Ogwsvl2212/06/2024 02:16 PM   Protocol Details Medication indicated for associated diagnosis       CYCLOBENZAPRINE 10MG TABLETS       Routing refill request to provider for review/approval because:  Drug not on the [facility] or [facility] refill protocol or controlled substance

## 2024-12-09 NOTE — TELEPHONE ENCOUNTER
Lotrimin      Last Written Prescription Date:  10/24/24  Last Fill Quantity: 113g,   # refills: 0  Last Office Visit: 9/13/24  Future Office visit:       Routing refill request to provider for review/approval because:      Flexeril      Last Written Prescription Date:  10/24/24  Last Fill Quantity: 30,   # refills: 0  Last Office Visit: 9/13/24  Future Office visit:       Routing refill request to provider for review/approval because:

## 2025-03-08 ENCOUNTER — HOSPITAL ENCOUNTER (EMERGENCY)
Facility: CLINIC | Age: 25
Discharge: HOME | End: 2025-03-09
Attending: EMERGENCY MEDICINE | Admitting: EMERGENCY MEDICINE
Payer: COMMERCIAL

## 2025-03-08 DIAGNOSIS — N23 RENAL COLIC: ICD-10-CM

## 2025-03-08 LAB
ALBUMIN UR-MCNC: 30 MG/DL
ANION GAP SERPL CALCULATED.3IONS-SCNC: 16 MMOL/L (ref 7–15)
APPEARANCE UR: CLEAR
BASOPHILS # BLD AUTO: 0.1 10E3/UL (ref 0–0.2)
BASOPHILS NFR BLD AUTO: 1 %
BILIRUB UR QL STRIP: NEGATIVE
BUN SERPL-MCNC: 16.4 MG/DL (ref 6–20)
CALCIUM SERPL-MCNC: 10.2 MG/DL (ref 8.8–10.4)
CHLORIDE SERPL-SCNC: 100 MMOL/L (ref 98–107)
COLOR UR AUTO: YELLOW
CREAT SERPL-MCNC: 1.43 MG/DL (ref 0.67–1.17)
EGFRCR SERPLBLD CKD-EPI 2021: 69 ML/MIN/1.73M2
EOSINOPHIL # BLD AUTO: 0.3 10E3/UL (ref 0–0.7)
EOSINOPHIL NFR BLD AUTO: 2 %
ERYTHROCYTE [DISTWIDTH] IN BLOOD BY AUTOMATED COUNT: 11.8 % (ref 10–15)
GLUCOSE SERPL-MCNC: 162 MG/DL (ref 70–99)
GLUCOSE UR STRIP-MCNC: NEGATIVE MG/DL
HCO3 SERPL-SCNC: 20 MMOL/L (ref 22–29)
HCT VFR BLD AUTO: 47.4 % (ref 40–53)
HGB BLD-MCNC: 17.2 G/DL (ref 13.3–17.7)
HGB UR QL STRIP: ABNORMAL
IMM GRANULOCYTES # BLD: 0 10E3/UL
IMM GRANULOCYTES NFR BLD: 0 %
KETONES UR STRIP-MCNC: 100 MG/DL
LEUKOCYTE ESTERASE UR QL STRIP: NEGATIVE
LYMPHOCYTES # BLD AUTO: 2.8 10E3/UL (ref 0.8–5.3)
LYMPHOCYTES NFR BLD AUTO: 22 %
MCH RBC QN AUTO: 31.6 PG (ref 26.5–33)
MCHC RBC AUTO-ENTMCNC: 36.3 G/DL (ref 31.5–36.5)
MCV RBC AUTO: 87 FL (ref 78–100)
MONOCYTES # BLD AUTO: 0.7 10E3/UL (ref 0–1.3)
MONOCYTES NFR BLD AUTO: 5 %
MUCOUS THREADS #/AREA URNS LPF: PRESENT /LPF
NEUTROPHILS # BLD AUTO: 9 10E3/UL (ref 1.6–8.3)
NEUTROPHILS NFR BLD AUTO: 70 %
NITRATE UR QL: NEGATIVE
NRBC # BLD AUTO: 0 10E3/UL
NRBC BLD AUTO-RTO: 0 /100
PH UR STRIP: 6 [PH] (ref 5–7)
PLATELET # BLD AUTO: 275 10E3/UL (ref 150–450)
POTASSIUM SERPL-SCNC: 3.4 MMOL/L (ref 3.4–5.3)
RBC # BLD AUTO: 5.44 10E6/UL (ref 4.4–5.9)
RBC URINE: 23 /HPF
SODIUM SERPL-SCNC: 136 MMOL/L (ref 135–145)
SP GR UR STRIP: 1.02 (ref 1–1.03)
SQUAMOUS EPITHELIAL: <1 /HPF
UROBILINOGEN UR STRIP-MCNC: 2 MG/DL
WBC # BLD AUTO: 12.9 10E3/UL (ref 4–11)
WBC URINE: 2 /HPF

## 2025-03-08 PROCEDURE — 99284 EMERGENCY DEPT VISIT MOD MDM: CPT | Performed by: EMERGENCY MEDICINE

## 2025-03-08 PROCEDURE — 85004 AUTOMATED DIFF WBC COUNT: CPT | Performed by: EMERGENCY MEDICINE

## 2025-03-08 PROCEDURE — 96374 THER/PROPH/DIAG INJ IV PUSH: CPT | Performed by: EMERGENCY MEDICINE

## 2025-03-08 PROCEDURE — 96361 HYDRATE IV INFUSION ADD-ON: CPT | Performed by: EMERGENCY MEDICINE

## 2025-03-08 PROCEDURE — 85018 HEMOGLOBIN: CPT | Performed by: EMERGENCY MEDICINE

## 2025-03-08 PROCEDURE — 96375 TX/PRO/DX INJ NEW DRUG ADDON: CPT | Performed by: EMERGENCY MEDICINE

## 2025-03-08 PROCEDURE — 250N000011 HC RX IP 250 OP 636: Performed by: EMERGENCY MEDICINE

## 2025-03-08 PROCEDURE — 80048 BASIC METABOLIC PNL TOTAL CA: CPT | Performed by: EMERGENCY MEDICINE

## 2025-03-08 PROCEDURE — 87086 URINE CULTURE/COLONY COUNT: CPT | Performed by: EMERGENCY MEDICINE

## 2025-03-08 PROCEDURE — 99285 EMERGENCY DEPT VISIT HI MDM: CPT | Mod: 25 | Performed by: EMERGENCY MEDICINE

## 2025-03-08 PROCEDURE — 258N000003 HC RX IP 258 OP 636: Performed by: EMERGENCY MEDICINE

## 2025-03-08 PROCEDURE — 81001 URINALYSIS AUTO W/SCOPE: CPT | Performed by: EMERGENCY MEDICINE

## 2025-03-08 PROCEDURE — 36415 COLL VENOUS BLD VENIPUNCTURE: CPT | Performed by: EMERGENCY MEDICINE

## 2025-03-08 RX ORDER — MORPHINE SULFATE 2 MG/ML
2 INJECTION, SOLUTION INTRAMUSCULAR; INTRAVENOUS
Status: COMPLETED | OUTPATIENT
Start: 2025-03-08 | End: 2025-03-09

## 2025-03-08 RX ORDER — KETOROLAC TROMETHAMINE 15 MG/ML
15 INJECTION, SOLUTION INTRAMUSCULAR; INTRAVENOUS ONCE
Status: COMPLETED | OUTPATIENT
Start: 2025-03-08 | End: 2025-03-08

## 2025-03-08 RX ORDER — ONDANSETRON 4 MG/1
4 TABLET, ORALLY DISINTEGRATING ORAL ONCE
Status: COMPLETED | OUTPATIENT
Start: 2025-03-08 | End: 2025-03-08

## 2025-03-08 RX ORDER — ONDANSETRON 2 MG/ML
4 INJECTION INTRAMUSCULAR; INTRAVENOUS EVERY 30 MIN PRN
Status: DISCONTINUED | OUTPATIENT
Start: 2025-03-08 | End: 2025-03-09 | Stop reason: HOSPADM

## 2025-03-08 RX ADMIN — KETOROLAC TROMETHAMINE 15 MG: 15 INJECTION, SOLUTION INTRAMUSCULAR; INTRAVENOUS at 21:44

## 2025-03-08 RX ADMIN — SODIUM CHLORIDE 1000 ML: 9 INJECTION, SOLUTION INTRAVENOUS at 22:43

## 2025-03-08 RX ADMIN — SODIUM CHLORIDE 1000 ML: 0.9 INJECTION, SOLUTION INTRAVENOUS at 21:29

## 2025-03-08 RX ADMIN — ONDANSETRON 4 MG: 2 INJECTION, SOLUTION INTRAMUSCULAR; INTRAVENOUS at 21:44

## 2025-03-08 RX ADMIN — ONDANSETRON 4 MG: 4 TABLET, ORALLY DISINTEGRATING ORAL at 21:00

## 2025-03-08 ASSESSMENT — COLUMBIA-SUICIDE SEVERITY RATING SCALE - C-SSRS
6. HAVE YOU EVER DONE ANYTHING, STARTED TO DO ANYTHING, OR PREPARED TO DO ANYTHING TO END YOUR LIFE?: NO
2. HAVE YOU ACTUALLY HAD ANY THOUGHTS OF KILLING YOURSELF IN THE PAST MONTH?: NO
1. IN THE PAST MONTH, HAVE YOU WISHED YOU WERE DEAD OR WISHED YOU COULD GO TO SLEEP AND NOT WAKE UP?: NO

## 2025-03-08 ASSESSMENT — ACTIVITIES OF DAILY LIVING (ADL)
ADLS_ACUITY_SCORE: 46

## 2025-03-08 NOTE — LETTER
March 9, 2025      To Whom It May Concern:      Julio Muñiz was seen in our Emergency Department today, 03/09/25.  I expect his condition to improve over the next 4-5 days.  He may return to work/school when improved.    Sincerely,        Dena Roche MD  Electronically signed

## 2025-03-09 ENCOUNTER — APPOINTMENT (OUTPATIENT)
Dept: CT IMAGING | Facility: CLINIC | Age: 25
End: 2025-03-09
Attending: EMERGENCY MEDICINE
Payer: COMMERCIAL

## 2025-03-09 VITALS
SYSTOLIC BLOOD PRESSURE: 141 MMHG | HEART RATE: 105 BPM | HEIGHT: 74 IN | TEMPERATURE: 98.6 F | OXYGEN SATURATION: 98 % | BODY MASS INDEX: 25.03 KG/M2 | DIASTOLIC BLOOD PRESSURE: 82 MMHG | RESPIRATION RATE: 16 BRPM | WEIGHT: 195 LBS

## 2025-03-09 LAB
ANION GAP SERPL CALCULATED.3IONS-SCNC: 9 MMOL/L (ref 7–15)
BUN SERPL-MCNC: 15.4 MG/DL (ref 6–20)
CALCIUM SERPL-MCNC: 8.3 MG/DL (ref 8.8–10.4)
CHLORIDE SERPL-SCNC: 107 MMOL/L (ref 98–107)
CREAT SERPL-MCNC: 1.5 MG/DL (ref 0.67–1.17)
EGFRCR SERPLBLD CKD-EPI 2021: 65 ML/MIN/1.73M2
GLUCOSE SERPL-MCNC: 114 MG/DL (ref 70–99)
HCO3 SERPL-SCNC: 23 MMOL/L (ref 22–29)
POTASSIUM SERPL-SCNC: 4.2 MMOL/L (ref 3.4–5.3)
SODIUM SERPL-SCNC: 139 MMOL/L (ref 135–145)

## 2025-03-09 PROCEDURE — 82435 ASSAY OF BLOOD CHLORIDE: CPT | Performed by: EMERGENCY MEDICINE

## 2025-03-09 PROCEDURE — 80048 BASIC METABOLIC PNL TOTAL CA: CPT | Performed by: EMERGENCY MEDICINE

## 2025-03-09 PROCEDURE — 250N000011 HC RX IP 250 OP 636: Performed by: EMERGENCY MEDICINE

## 2025-03-09 PROCEDURE — 74176 CT ABD & PELVIS W/O CONTRAST: CPT

## 2025-03-09 PROCEDURE — 96375 TX/PRO/DX INJ NEW DRUG ADDON: CPT | Performed by: EMERGENCY MEDICINE

## 2025-03-09 PROCEDURE — 96376 TX/PRO/DX INJ SAME DRUG ADON: CPT | Performed by: EMERGENCY MEDICINE

## 2025-03-09 PROCEDURE — 82565 ASSAY OF CREATININE: CPT | Performed by: EMERGENCY MEDICINE

## 2025-03-09 PROCEDURE — 74176 CT ABD & PELVIS W/O CONTRAST: CPT | Mod: 26 | Performed by: RADIOLOGY

## 2025-03-09 PROCEDURE — 36415 COLL VENOUS BLD VENIPUNCTURE: CPT | Performed by: EMERGENCY MEDICINE

## 2025-03-09 RX ORDER — KETOROLAC TROMETHAMINE 15 MG/ML
15 INJECTION, SOLUTION INTRAMUSCULAR; INTRAVENOUS ONCE
Status: COMPLETED | OUTPATIENT
Start: 2025-03-09 | End: 2025-03-09

## 2025-03-09 RX ORDER — ONDANSETRON 4 MG/1
4 TABLET, ORALLY DISINTEGRATING ORAL EVERY 8 HOURS PRN
Qty: 10 TABLET | Refills: 0 | Status: SHIPPED | OUTPATIENT
Start: 2025-03-09 | End: 2025-03-12

## 2025-03-09 RX ORDER — OXYCODONE HYDROCHLORIDE 5 MG/1
5 TABLET ORAL EVERY 6 HOURS PRN
Qty: 6 TABLET | Refills: 0 | Status: SHIPPED | OUTPATIENT
Start: 2025-03-09 | End: 2025-03-12

## 2025-03-09 RX ADMIN — KETOROLAC TROMETHAMINE 15 MG: 15 INJECTION, SOLUTION INTRAMUSCULAR; INTRAVENOUS at 04:02

## 2025-03-09 RX ADMIN — MORPHINE SULFATE 2 MG: 2 INJECTION, SOLUTION INTRAMUSCULAR; INTRAVENOUS at 01:04

## 2025-03-09 ASSESSMENT — ACTIVITIES OF DAILY LIVING (ADL)
ADLS_ACUITY_SCORE: 46

## 2025-03-09 NOTE — DISCHARGE INSTRUCTIONS
Take 1000 mg of Tylenol and 600 mg of ibuprofen every 6 hours for pain control.  Ibuprofen is very similar to the medication that gave you the most relief here in the emergency department.  Please make sure you are including this and your outpatient pain regimen.    Can take oxycodone as needed for breakthrough pain.  Do not drive, mix with alcohol mix with other benzodiazepines or other opioids.    If you develop fever, burning with urination, muscle aches or bodyaches are concerning for infection you need to return to the ER immediately as it infected stone can be an medical emergency.

## 2025-03-09 NOTE — ED PROVIDER NOTES
ED Provider Note  [facility]      History     Chief Complaint   Patient presents with    Flank Pain     HPI  Julio Muñiz is a 24 year old male who presents to the ER for evaluation of flank pain.    Pt states that he feels like he has a kidney stone. He feels like he is going to pass out and he is naseaous. He states that he has front and back flank pain that radiates into his testicle, he is not able to determine where the pain originates. He states it was sudden onset at 7am this morning when he could not urinate. He denies blood in his urine. He took tyleno early tonight as well as an oxycodone that he had leftover from a prior surgery around 5pm tonight which did not give him relief.  No fevers.  No dysuria.      Past Medical History  Past Medical History:   Diagnosis Date    Pectus excavatum      Past Surgical History:   Procedure Laterality Date    ADENOIDECTOMY      MYRINGOTOMY BILATERAL      REMOVE PORT VASCULAR ACCESS N/A 5/26/2021    Procedure: Chest wall hardware removal;  Surgeon: Maya Anaya MD;  Location: [facility] OR    REPAIR PECTUS EXCAVATUM N/A 10/09/2020    Procedure: Modified Ravitch Repair with sternal plating;  Surgeon: Maya Anaya MD;  Location: [facility] OR    TYMPANOPLASTY      harvest facial graft     ondansetron (ZOFRAN ODT) 4 MG ODT tab  oxyCODONE (ROXICODONE) 5 MG tablet  acetaminophen (TYLENOL) 325 MG tablet  ammonium lactate (AMLACTIN) 12 % external cream  clotrimazole (LOTRIMIN) 1 % external cream  cyclobenzaprine (FLEXERIL) 10 MG tablet  ibuprofen (ADVIL/MOTRIN) 600 MG tablet      No Known Allergies  Family History  Family History   Problem Relation Age of Onset    Asthma Mother      Social History   Social History     Tobacco Use    Smoking status: Never    Smokeless tobacco: Never   Vaping Use    Vaping status: Never Used   Substance Use Topics    Alcohol use: Not Currently     Comment: none over one month    Drug use: No      A  "medically appropriate review of systems was performed with pertinent positives and negatives noted in the HPI, and all other systems negative.    Physical Exam   BP: (!) 157/82  Pulse: 116  Temp: 97.6  F (36.4  C)  Resp: 20  Height: 188 cm (6' 2\")  Weight: 88.5 kg (195 lb)  SpO2: 100 %  Physical Exam  General: awake, patient appears acutely uncomfortable, actively retching on exam  Head: normal cephalic  HEENT: pupils equal, conjugate gaze intact  Neck: Supple  CV: regular rate   Lungs: Breathing comfortably on room air  Abd: soft, non-tender, no guarding, no peritoneal signs  Back: Does have left-sided flank tenderness, none on the right   : No significant testicular tenderness noted.  EXT: lower extremities without swelling or edema  Neuro: awake, answers questions appropriately. No focal deficits noted       ED Course, Procedures, & Data      Procedures       Results for orders placed or performed during the hospital encounter of 03/08/25   CT Abdomen Pelvis w/o Contrast     Status: None    Narrative    EXAM: CT ABDOMEN PELVIS W/O CONTRAST  LOCATION: [facility]  DATE: 3/9/2025    INDICATION: Abdominal pain. Rule out stone.  COMPARISON: 2/20/2022  TECHNIQUE: CT scan of the abdomen and pelvis was performed without IV contrast. Multiplanar reformats were obtained. Dose reduction techniques were used.  CONTRAST: None.    FINDINGS:   LOWER CHEST: Normal.    HEPATOBILIARY: Normal liver and gallbladder.    PANCREAS: Normal.    SPLEEN: Normal.    ADRENAL GLANDS: Normal.    KIDNEYS/BLADDER: A 1 mm stone is seen in the distal left ureter just proximal to the left ureterovesicular junction with mild left hydroureteronephrosis. Multiple 1 to 2 mm nonobstructing stone seen in each kidney, significantly increased in number.. No   right hydroureteronephrosis. Bladder is normal.    BOWEL: Normal including the appendix. No free fluid or free air.    LYMPH NODES: " Normal.    VASCULATURE: Normal.    PELVIC ORGANS: Normal.    MUSCULOSKELETAL: Pectus excavatum deformity redemonstrated. No other significant osseous abnormalities.        Impression    IMPRESSION:     1.  1 mm distal left ureteral stone with mild left hydroureteronephrosis.    2.  Multiple 1 to 2 mm nonobstructing stones in both kidneys, significantly increased in number since prior study.    3.  Pectus excavatum.     [place] Draw *Canceled*     Status: None ()    Narrative    The following orders were created for panel order [place] Draw.  Procedure                               Abnormality         Status                     ---------                               -----------         ------                       Please view results for these tests on the individual orders.   UA with Microscopic reflex to Culture     Status: Abnormal    Specimen: Urine, Midstream   Result Value Ref Range    Color Urine Yellow Colorless, Straw, Light Yellow, Yellow    Appearance Urine Clear Clear    Glucose Urine Negative Negative mg/dL    Bilirubin Urine Negative Negative    Ketones Urine 100 (A) Negative mg/dL    Specific Gravity Urine 1.025 1.003 - 1.035    Blood Urine Moderate (A) Negative    pH Urine 6.0 5.0 - 7.0    Protein Albumin Urine 30 (A) Negative mg/dL    Urobilinogen Urine 2.0 Normal, 2.0 mg/dL    Nitrite Urine Negative Negative    Leukocyte Esterase Urine Negative Negative    Mucus Urine Present (A) None Seen /LPF    RBC Urine 23 (H) <=2 /HPF    WBC Urine 2 <=5 /HPF    Squamous Epithelials Urine <1 <=1 /HPF    Narrative    Urine Culture not indicated   Basic metabolic panel     Status: Abnormal   Result Value Ref Range    Sodium 136 135 - 145 mmol/L    Potassium 3.4 3.4 - 5.3 mmol/L    Chloride 100 98 - 107 mmol/L    Carbon Dioxide (CO2) 20 (L) 22 - 29 mmol/L    Anion Gap 16 (H) 7 - 15 mmol/L    Urea Nitrogen 16.4 6.0 - 20.0 mg/dL    Creatinine 1.43 (H) 0.67 - 1.17 mg/dL    GFR Estimate 69 >60 mL/min/1.73m2    Calcium  10.2 8.8 - 10.4 mg/dL    Glucose 162 (H) 70 - 99 mg/dL   CBC with platelets and differential     Status: Abnormal   Result Value Ref Range    WBC Count 12.9 (H) 4.0 - 11.0 10e3/uL    RBC Count 5.44 4.40 - 5.90 10e6/uL    Hemoglobin 17.2 13.3 - 17.7 g/dL    Hematocrit 47.4 40.0 - 53.0 %    MCV 87 78 - 100 fL    MCH 31.6 26.5 - 33.0 pg    MCHC 36.3 31.5 - 36.5 g/dL    RDW 11.8 10.0 - 15.0 %    Platelet Count 275 150 - 450 10e3/uL    % Neutrophils 70 %    % Lymphocytes 22 %    % Monocytes 5 %    % Eosinophils 2 %    % Basophils 1 %    % Immature Granulocytes 0 %    NRBCs per 100 WBC 0 <1 /100    Absolute Neutrophils 9.0 (H) 1.6 - 8.3 10e3/uL    Absolute Lymphocytes 2.8 0.8 - 5.3 10e3/uL    Absolute Monocytes 0.7 0.0 - 1.3 10e3/uL    Absolute Eosinophils 0.3 0.0 - 0.7 10e3/uL    Absolute Basophils 0.1 0.0 - 0.2 10e3/uL    Absolute Immature Granulocytes 0.0 <=0.4 10e3/uL    Absolute NRBCs 0.0 10e3/uL   CBC with Platelets & Differential     Status: Abnormal    Narrative    The following orders were created for panel order CBC with Platelets & Differential.  Procedure                               Abnormality         Status                     ---------                               -----------         ------                     CBC with platelets and ...[[id number]]  Abnormal            Final result                 Please view results for these tests on the individual orders.     Medications   ondansetron (ZOFRAN) injection 4 mg (4 mg Intravenous $Given 3/8/25 2144)   morphine (PF) injection 2 mg (has no administration in time range)   ondansetron (ZOFRAN ODT) ODT tab 4 mg (4 mg Oral $Given 3/8/25 2100)   sodium chloride 0.9% BOLUS 1,000 mL (0 mLs Intravenous Stopped 3/8/25 2243)   ketorolac (TORADOL) injection 15 mg (15 mg Intravenous $Given 3/8/25 2144)   sodium chloride 0.9% BOLUS 1,000 mL (1,000 mLs Intravenous $New Bag 3/8/25 4746)     Labs Ordered and Resulted from Time of ED Arrival to Time of ED Departure    ROUTINE UA WITH MICROSCOPIC REFLEX TO CULTURE - Abnormal       Result Value    Color Urine Yellow      Appearance Urine Clear      Glucose Urine Negative      Bilirubin Urine Negative      Ketones Urine 100 (*)     Specific Gravity Urine 1.025      Blood Urine Moderate (*)     pH Urine 6.0      Protein Albumin Urine 30 (*)     Urobilinogen Urine 2.0      Nitrite Urine Negative      Leukocyte Esterase Urine Negative      Mucus Urine Present (*)     RBC Urine 23 (*)     WBC Urine 2      Squamous Epithelials Urine <1     BASIC METABOLIC PANEL - Abnormal    Sodium 136      Potassium 3.4      Chloride 100      Carbon Dioxide (CO2) 20 (*)     Anion Gap 16 (*)     Urea Nitrogen 16.4      Creatinine 1.43 (*)     GFR Estimate 69      Calcium 10.2      Glucose 162 (*)    CBC WITH PLATELETS AND DIFFERENTIAL - Abnormal    WBC Count 12.9 (*)     RBC Count 5.44      Hemoglobin 17.2      Hematocrit 47.4      MCV 87      MCH 31.6      MCHC 36.3      RDW 11.8      Platelet Count 275      % Neutrophils 70      % Lymphocytes 22      % Monocytes 5      % Eosinophils 2      % Basophils 1      % Immature Granulocytes 0      NRBCs per 100 WBC 0      Absolute Neutrophils 9.0 (*)     Absolute Lymphocytes 2.8      Absolute Monocytes 0.7      Absolute Eosinophils 0.3      Absolute Basophils 0.1      Absolute Immature Granulocytes 0.0      Absolute NRBCs 0.0     BASIC METABOLIC PANEL   URINE CULTURE     CT Abdomen Pelvis w/o Contrast   Final Result   IMPRESSION:       1.  1 mm distal left ureteral stone with mild left hydroureteronephrosis.      2.  Multiple 1 to 2 mm nonobstructing stones in both kidneys, significantly increased in number since prior study.      3.  Pectus excavatum.                Critical care was not performed.     Medical Decision Making  The patient's presentation was of moderate complexity (an acute illness with systemic symptoms).    The patient's evaluation involved:  ordering and/or review of 3+ test(s) in this  encounter (see separate area of note for details)    The patient's management necessitated moderate risk (prescription drug management including medications given in the ED).    Assessment & Plan    Patient presents to the emergency department acutely uncomfortable with left-sided flank pain.  Reports feels similar to her previous kidney stone.  On exam he is actively retching, has flank pain also endorses pain that radiates down the testicle.  He only has mild testicular tenderness I favor renal colic over testicular pathology.  Patient was given IV Zofran and IV Toradol for pain control and on reassessment patient is far more comfortable stating the Toradol almost relieved his pain completely.  He denies preceding dysuria or fevers.  He states prior to the pain onset he was feeling generally well I think things like infected stone are lower on the differential.    Labs are notable for a bump in his creatinine and a slight anion gap.  Slightly elevated white count with a left shift.  Urine shows RBCs which is consistent with stone; no evidence of infected stone treatment.  Ordered CT.     CT scan shows small kidney stone.  Patient's creatinine is minimally elevated will repeat BMP after fluid hydration will sign out Dr. Goldman would anticipate he could go home if creatinine improved with IV fluid hydration.  He was given ER return precautions.  Discussed signs and symptoms of infected stone.  Will prescribe pain medications gave all opioid precautions to the patient he expressed understanding.  Will put for a outpatient urology referral discussed ER return precautions patient understands  ,    I have reviewed the nursing notes. I have reviewed the findings, diagnosis, plan and need for follow up with the patient.    New Prescriptions    ONDANSETRON (ZOFRAN ODT) 4 MG ODT TAB    Take 1 tablet (4 mg) by mouth every 8 hours as needed.    OXYCODONE (ROXICODONE) 5 MG TABLET    Take 1 tablet (5 mg) by mouth every 6 hours  as needed.       Final diagnoses:   Renal colic       Guzman Nagel I, Ines Hughes, am serving as a trained medical scribe to document services personally performed by Guzman Nagel MD, based on the provider's statements to me.     I, Guzman Nagel MD, was physically present and have reviewed and verified the accuracy of this note documented by Ines Hughes.  MD  [facility] EMERGENCY DEPARTMENT  I am providing3/8/2025     Guzman Nagel MD  03/09/25 0052

## 2025-03-09 NOTE — ED NOTES
Emergency Department I-PASS Sign-out      Illness Severity: Stable    Patient Summary:  24 year old male with pertinent PMH of kidney stones who presented with symptoms consistent with recurrent kidney stones    ED Course/treatment plan: Pain and symptoms controlled with Toradol, Zofran.  UA consistent with kidney stone.  CT pending    Clinical Impression:  (N23) Renal colic      Edited by: Guzman Nagel MD at 3/9/2025 0012    Action List:  -To do:    Imaging: CT scan    Situational Awareness & Contingency Planning:  Code Status (Most recent):  No Order    Disposition:  likely to be discharged    Edited by: Guzman Nagel MD at 3/9/2025 0012    Synthesis & Events after sign-out:  CT with 1mm left UVJ stone with hydro. Has multiple intra-renal stones as well.   Repeat BMP with Cr 1.5.   Discussed with Urology resident on call and OK for outpatient follow up. Referral in place and pain management prescriptions completed.         Dena Roche MD   Emergency Medicine       Dena Roche MD  03/09/25 9941

## 2025-03-09 NOTE — ED TRIAGE NOTES
Pt ambulatory to triage with CC of flank and L groin pain. Says he had a kidney stone 3 years ago and this feels similar. Also C/o trouble urinating and intermittent tingling to extremities. Tried to manage symptoms at home with increasing hydration, APAP, and an old oxycodone tablet he had on hand, but this has not provided relief.     Triage Assessment (Adult)       Row Name 03/08/25 2049          Triage Assessment    Airway WDL WDL        Respiratory WDL    Respiratory WDL WDL        Skin Circulation/Temperature WDL    Skin Circulation/Temperature WDL WDL        Cardiac WDL    Cardiac WDL WDL        Peripheral/Neurovascular WDL    Peripheral Neurovascular WDL X;neurovascular assessment lower;neurovascular assessment upper  intermittent tingling extremities        Cognitive/Neuro/Behavioral WDL    Cognitive/Neuro/Behavioral WDL WDL

## 2025-03-10 LAB — BACTERIA UR CULT: NORMAL

## 2025-03-14 ENCOUNTER — LAB (OUTPATIENT)
Dept: LAB | Facility: OTHER | Age: 25
End: 2025-03-14
Payer: COMMERCIAL

## 2025-03-14 DIAGNOSIS — R79.89 ELEVATED SERUM CREATININE: ICD-10-CM

## 2025-03-14 LAB
ANION GAP SERPL CALCULATED.3IONS-SCNC: 15 MMOL/L (ref 7–15)
BUN SERPL-MCNC: 11.9 MG/DL (ref 6–20)
CALCIUM SERPL-MCNC: 9.9 MG/DL (ref 8.8–10.4)
CHLORIDE SERPL-SCNC: 101 MMOL/L (ref 98–107)
CREAT SERPL-MCNC: 1.22 MG/DL (ref 0.67–1.17)
EGFRCR SERPLBLD CKD-EPI 2021: 84 ML/MIN/1.73M2
GLUCOSE SERPL-MCNC: 151 MG/DL (ref 70–99)
HCO3 SERPL-SCNC: 25 MMOL/L (ref 22–29)
POTASSIUM SERPL-SCNC: 4 MMOL/L (ref 3.4–5.3)
SODIUM SERPL-SCNC: 141 MMOL/L (ref 135–145)

## 2025-03-26 NOTE — PROGRESS NOTES
Reason for visit: Kidney stone consult - ED visit 3/8/25.     Relevant information:     CT AB:   Final Result   IMPRESSION:        1.  1 mm distal left ureteral stone with mild left hydroureteronephrosis.       2.  Multiple 1 to 2 mm nonobstructing stones in both kidneys, significantly increased in number since prior study.       3.  Pectus excavatum.           Records/imaging/labs/orders: [place] [facility], CARE EVERYWHERE, AND PACS     At Rooming: Standard rooming    Peggy Reich  3/26/2025  12:35 PM

## 2025-04-07 NOTE — PROGRESS NOTES
Video-Visit Details    Type of service:  Video Visit  Originating Location (pt. Location): Home  Distant Location (provider location):  [facility] [place]   Platform used for Video Visit: InPlace          UROLOGY VIRTUAL VISIT      Chief Complaint:   Urolithiasis      Synopsis    Julio Muñiz is a very pleasant 24 year old person    Brief  History:  24M PMHx pectus excavatum and ?nephrolithiasis (2022, did not establish with urology) who presented to ED 3/8/25 with acute onset left sided flank pain radiating to his testicle, as well as nausea. Labs demonstrated Cr = 1.5 (baseline appears to be ~ 1.0 - 1.2), mild leukocytosis, and UA without concern for infection. Final Ucx growing <10k mixed urogenital amari. CT AP w/o obtained demonstrating ~2mm left distal ureteral stone with associated mild left hydroureteronephrosis. Additionally noted were numerous small nonobstructing bilateral stones.    Today notes:   Feels as though he passed stone approximately 1 week ago, as that is when his previous symptoms resolved. Denies flank or abdominal pain, no testicular pain, no nausea/vomiting, dysuria fever or chills. Has been straining his urine without visualizing passage.    Pertinent stone history:  + Personal stone history (punctate distal left ureteral stone vs phlebolith [no hydronephrosis] in 2022)  -- Prior stone procedure  -- Prior stone analysis  -- Prior metabolic evaluation  + Family stone history (father and paternal father)    Pertinent stone medical history  -- Obesity (There is no height or weight on file to calculate BMI.)  -- Diabetes  -- Neurologic disease limiting mobility   -- Osteoporosis  -- Gout  -- Gastric bypass surgery  -- Sarcoidosis  -- Inflammatory bowel disease  -- History of non-stone  surgery     Pertinent medications:  -- Antiplatelet  -- Anticoagulant  -- Topiramate  -- Thiazide  -- Potassium supplement      Supplements: Multivitamin, Vitamin D3 (has  stopped)             Assessment/Plan   24 year old male PMHx pectus excavatum and ?nephrolithiasis with newly diagnosed ~2mm obstructing distal left ureteral stone, as well as numerous bilateral nonobstructing stones. This appears to be a significant increase in stone burden from prior imaging in 2022. Currently, his symptoms have resolved. He believes he has passed his stone, but has not visualized this with straining.  He has not undergone repeat imaging.    -- Obtain repeat CT AP w/o to confirm stone passage given resolution of symptoms  -- After shared decision making, agreed to proceed with 24 hr urine study given increasing stone burden and recurrent episode of renal colic  -- Return to clinic after above studies completed.           Physical Exam:   General Appearance: Well groomed, hygenic  Eyes: No redness, discharge  Respiratory: No cough, no respiratory distress or labored breathing  Musculoskeletal:  grossly normal, full range of motion in upper extremities, no gross deficits  Skin: No discoloration or apparent rashes  Neurologic - No tremors  Psychiatric - Alert and oriented  The rest of a comprehensive physical examination is deferred due to public health emergency video visit restrictions      The following  distinct labs were reviewed    I personally reviewed all applicable laboratory data and went over findings with patient  Significant for:    CBC RESULTS:  Recent Labs   Lab Test 03/08/25 2119 09/13/24  1529 06/28/24  1109 02/20/22  1945   WBC 12.9* 5.8 5.4 8.2   HGB 17.2 16.9 17.6 16.7    243 266 263        BMP RESULTS:  Recent Labs   Lab Test 03/14/25  1311 03/09/25  0055 03/08/25 2119 09/13/24  1529 02/20/22  1945 10/11/20  0707 10/10/20  0656    139 136 141   < > 139 140   POTASSIUM 4.0 4.2 3.4 3.9   < > 3.9 3.9   CHLORIDE 101 107 100 102   < > 105 107   CO2 25 23 20* 27   < > 30 27   ANIONGAP 15 9 16* 12   < > 4 6   * 114* 162* 78   < > 101* 102*   BUN 11.9 15.4 16.4 13.6    < > 10 14   CR 1.22* 1.50* 1.43* 1.22*   < > 1.08 1.03   GFRESTIMATED 84 65 69 84   < > >90 >90   GFRESTBLACK  --   --   --   --   --  >90 >90   WANDA 9.9 8.3* 10.2 9.8   < > 8.8 8.6    < > = values in this interval not displayed.       UA RESULTS:   Recent Labs   Lab Test 03/08/25 2315 02/20/22 2005   SG 1.025 1.028   URINEPH 6.0 6.0   NITRITE Negative Negative   RBCU 23* >182*   WBCU 2 8*       30-Day Micro Results       Collected Updated Procedure Result Status      03/08/2025 2315 03/10/2025 0527 Urine Culture [[id number]]   Urine, Midstream    Final result Component Value   Culture <10,000 CFU/mL Mixture of Urogenital Lakeisha                           Recent Imaging Report    I personally reviewed all applicable imaging and went over the below findings with patient.    Results for orders placed or performed during the hospital encounter of 03/08/25   CT Abdomen Pelvis w/o Contrast    Narrative    EXAM: CT ABDOMEN PELVIS W/O CONTRAST  LOCATION: [facility]  DATE: 3/9/2025    INDICATION: Abdominal pain. Rule out stone.  COMPARISON: 2/20/2022  TECHNIQUE: CT scan of the abdomen and pelvis was performed without IV contrast. Multiplanar reformats were obtained. Dose reduction techniques were used.  CONTRAST: None.    FINDINGS:   LOWER CHEST: Normal.    HEPATOBILIARY: Normal liver and gallbladder.    PANCREAS: Normal.    SPLEEN: Normal.    ADRENAL GLANDS: Normal.    KIDNEYS/BLADDER: A 1 mm stone is seen in the distal left ureter just proximal to the left ureterovesicular junction with mild left hydroureteronephrosis. Multiple 1 to 2 mm nonobstructing stone seen in each kidney, significantly increased in number.. No   right hydroureteronephrosis. Bladder is normal.    BOWEL: Normal including the appendix. No free fluid or free air.    LYMPH NODES: Normal.    VASCULATURE: Normal.    PELVIC ORGANS: Normal.    MUSCULOSKELETAL: Pectus excavatum deformity redemonstrated. No  other significant osseous abnormalities.        Impression    IMPRESSION:     1.  1 mm distal left ureteral stone with mild left hydroureteronephrosis.    2.  Multiple 1 to 2 mm nonobstructing stones in both kidneys, significantly increased in number since prior study.    3.  Pectus excavatum.         Personal Review of Recent Imaging    I personally reviewed the above CT AP w/o and based on my own interpretation - ~2.5mm left UVJ stone with associated mild hydroureteronephrosis, as well as numerous bilateral small nonobstructing stones. This appears to be a significant increase when compared to prior CT from 2022.           Past Medical History:     Past Medical History:   Diagnosis Date    Pectus excavatum             Past Surgical History:     Past Surgical History:   Procedure Laterality Date    ADENOIDECTOMY      MYRINGOTOMY BILATERAL      REMOVE PORT VASCULAR ACCESS N/A 5/26/2021    Procedure: Chest wall hardware removal;  Surgeon: Maya Anaya MD;  Location: [facility] OR    REPAIR PECTUS EXCAVATUM N/A 10/09/2020    Procedure: Modified Ravitch Repair with sternal plating;  Surgeon: Maya Anaya MD;  Location: [facility] OR    TYMPANOPLASTY      harvest facial graft            Medications     Current Outpatient Medications   Medication Sig Dispense Refill    acetaminophen (TYLENOL) 325 MG tablet Take 3 tablets (975 mg) by mouth every 6 hours      ammonium lactate (AMLACTIN) 12 % external cream Apply topically 2 times daily. 385 g 0    clotrimazole (LOTRIMIN) 1 % external cream APPLY TOPICALLY TWICE DAILY 90 g 2    cyclobenzaprine (FLEXERIL) 10 MG tablet TAKE 1 TABLET(10 MG) BY MOUTH EVERY NIGHT AS NEEDED FOR MUSCLE SPASMS 30 tablet 0    ibuprofen (ADVIL/MOTRIN) 600 MG tablet Take 1 tablet (600 mg) by mouth every 6 hours       No current facility-administered medications for this visit.            Family History:     Family History   Problem Relation Age of Onset    Asthma Mother             Social  History:     Social History     Socioeconomic History    Marital status: Single     Spouse name: Not on file    Number of children: Not on file    Years of education: Not on file    Highest education level: Not on file   Occupational History    Not on file   Tobacco Use    Smoking status: Never    Smokeless tobacco: Never   Vaping Use    Vaping status: Never Used   Substance and Sexual Activity    Alcohol use: Not Currently     Comment: none over one month    Drug use: No    Sexual activity: Not on file   Other Topics Concern    Not on file   Social History Narrative    Not on file     Social Drivers of Health     Financial Resource Strain: Low Risk  (2/9/2024)    Financial Resource Strain     Within the past 12 months, have you or your family members you live with been unable to get utilities (heat, electricity) when it was really needed?: No   Food Insecurity: Low Risk  (2/9/2024)    Food Insecurity     Within the past 12 months, did you worry that your food would run out before you got money to buy more?: No     Within the past 12 months, did the food you bought just not last and you didn t have money to get more?: No   Transportation Needs: Low Risk  (2/9/2024)    Transportation Needs     Within the past 12 months, has lack of transportation kept you from medical appointments, getting your medicines, non-medical meetings or appointments, work, or from getting things that you need?: No   Physical Activity: Not on file   Stress: Not on file   Social Connections: Not on file   Interpersonal Safety: Low Risk  (9/13/2024)    Interpersonal Safety     Do you feel physically and emotionally safe where you currently live?: Yes     Within the past 12 months, have you been hit, slapped, kicked or otherwise physically hurt by someone?: No     Within the past 12 months, have you been humiliated or emotionally abused in other ways by your partner or ex-partner?: No   Housing Stability: Low Risk  (2/9/2024)    Housing  Stability     Do you have housing? : Yes     Are you worried about losing your housing?: No            Allergies:   Patient has no known allergies.         Review of Systems:  From intake questionnaire   Negative 14 system review except as noted on HPI, nurse's note.      CC:  Haley Rey

## 2025-04-08 ENCOUNTER — VIRTUAL VISIT (OUTPATIENT)
Dept: UROLOGY | Facility: CLINIC | Age: 25
End: 2025-04-08
Payer: COMMERCIAL

## 2025-04-08 ENCOUNTER — PRE VISIT (OUTPATIENT)
Dept: UROLOGY | Facility: CLINIC | Age: 25
End: 2025-04-08

## 2025-04-08 DIAGNOSIS — N23 RENAL COLIC: ICD-10-CM

## 2025-04-08 PROCEDURE — 98001 SYNCH AUDIO-VIDEO NEW LOW 30: CPT | Performed by: UROLOGY

## 2025-04-08 NOTE — LETTER
4/8/2025       RE: Julio Muñiz  [address] [facility] [address]     Dear Colleague,    Thank you for referring your patient, Julio Muñiz, to the [facility] [place] [facility]. Please see a copy of my visit note below.    Video-Visit Details    Type of service:  Video Visit  Originating Location (pt. Location): Home  Distant Location (provider location):  [facility]OLOGY [facility]   Platform used for Video Visit: [employer]          UROLOGY VIRTUAL VISIT      Chief Complaint:   Urolithiasis      Synopsis    Julio Muñiz is a very pleasant 24 year old person    Brief  History:  24M PMHx pectus excavatum and ?nephrolithiasis (2022, did not establish with urology) who presented to ED 3/8/25 with acute onset left sided flank pain radiating to his testicle, as well as nausea. Labs demonstrated Cr = 1.5 (baseline appears to be ~ 1.0 - 1.2), mild leukocytosis, and UA without concern for infection. Final Ucx growing <10k mixed urogenital amari. CT AP w/o obtained demonstrating ~2mm left distal ureteral stone with associated mild left hydroureteronephrosis. Additionally noted were numerous small nonobstructing bilateral stones.    Today notes:   Feels as though he passed stone approximately 1 week ago, as that is when his previous symptoms resolved. Denies flank or abdominal pain, no testicular pain, no nausea/vomiting, dysuria fever or chills. Has been straining his urine without visualizing passage.    Pertinent stone history:  + Personal stone history (punctate distal left ureteral stone vs phlebolith [no hydronephrosis] in 2022)  -- Prior stone procedure  -- Prior stone analysis  -- Prior metabolic evaluation  + Family stone history (father and paternal father)    Pertinent stone medical history  -- Obesity (There is no height or weight on file to calculate BMI.)  -- Diabetes  -- Neurologic disease limiting  mobility   -- Osteoporosis  -- Gout  -- Gastric bypass surgery  -- Sarcoidosis  -- Inflammatory bowel disease  -- History of non-stone  surgery     Pertinent medications:  -- Antiplatelet  -- Anticoagulant  -- Topiramate  -- Thiazide  -- Potassium supplement      Supplements: Multivitamin, Vitamin D3 (has stopped)             Assessment/Plan   24 year old male PMHx pectus excavatum and ?nephrolithiasis with newly diagnosed ~2mm obstructing distal left ureteral stone, as well as numerous bilateral nonobstructing stones. This appears to be a significant increase in stone burden from prior imaging in 2022. Currently, his symptoms have resolved. He believes he has passed his stone, but has not visualized this with straining.  He has not undergone repeat imaging.    -- Obtain repeat CT AP w/o to confirm stone passage given resolution of symptoms  -- After shared decision making, agreed to proceed with 24 hr urine study given increasing stone burden and recurrent episode of renal colic  -- Return to clinic after above studies completed.           Physical Exam:   General Appearance: Well groomed, hygenic  Eyes: No redness, discharge  Respiratory: No cough, no respiratory distress or labored breathing  Musculoskeletal:  grossly normal, full range of motion in upper extremities, no gross deficits  Skin: No discoloration or apparent rashes  Neurologic - No tremors  Psychiatric - Alert and oriented  The rest of a comprehensive physical examination is deferred due to public health emergency video visit restrictions      The following  distinct labs were reviewed    I personally reviewed all applicable laboratory data and went over findings with patient  Significant for:    CBC RESULTS:  Recent Labs   Lab Test 03/08/25  2119 09/13/24  1529 06/28/24  1109 02/20/22  1945   WBC 12.9* 5.8 5.4 8.2   HGB 17.2 16.9 17.6 16.7    243 266 263        BMP RESULTS:  Recent Labs   Lab Test 03/14/25  1311 03/09/25  0055  03/08/25 2119 09/13/24  1529 02/20/22  1945 10/11/20  0707 10/10/20  0656    139 136 141   < > 139 140   POTASSIUM 4.0 4.2 3.4 3.9   < > 3.9 3.9   CHLORIDE 101 107 100 102   < > 105 107   CO2 25 23 20* 27   < > 30 27   ANIONGAP 15 9 16* 12   < > 4 6   * 114* 162* 78   < > 101* 102*   BUN 11.9 15.4 16.4 13.6   < > 10 14   CR 1.22* 1.50* 1.43* 1.22*   < > 1.08 1.03   GFRESTIMATED 84 65 69 84   < > >90 >90   GFRESTBLACK  --   --   --   --   --  >90 >90   WANDA 9.9 8.3* 10.2 9.8   < > 8.8 8.6    < > = values in this interval not displayed.       UA RESULTS:   Recent Labs   Lab Test 03/08/25 2315 02/20/22 2005   SG 1.025 1.028   URINEPH 6.0 6.0   NITRITE Negative Negative   RBCU 23* >182*   WBCU 2 8*       30-Day Micro Results       Collected Updated Procedure Result Status      03/08/2025 2315 03/10/2025 0527 Urine Culture [[id number]]   Urine, Midstream    Final result Component Value   Culture <10,000 CFU/mL Mixture of Urogenital Lakeisha                           Recent Imaging Report    I personally reviewed all applicable imaging and went over the below findings with patient.    Results for orders placed or performed during the hospital encounter of 03/08/25   CT Abdomen Pelvis w/o Contrast    Narrative    EXAM: CT ABDOMEN PELVIS W/O CONTRAST  LOCATION: [facility]  DATE: 3/9/2025    INDICATION: Abdominal pain. Rule out stone.  COMPARISON: 2/20/2022  TECHNIQUE: CT scan of the abdomen and pelvis was performed without IV contrast. Multiplanar reformats were obtained. Dose reduction techniques were used.  CONTRAST: None.    FINDINGS:   LOWER CHEST: Normal.    HEPATOBILIARY: Normal liver and gallbladder.    PANCREAS: Normal.    SPLEEN: Normal.    ADRENAL GLANDS: Normal.    KIDNEYS/BLADDER: A 1 mm stone is seen in the distal left ureter just proximal to the left ureterovesicular junction with mild left hydroureteronephrosis. Multiple 1 to 2 mm nonobstructing  stone seen in each kidney, significantly increased in number.. No   right hydroureteronephrosis. Bladder is normal.    BOWEL: Normal including the appendix. No free fluid or free air.    LYMPH NODES: Normal.    VASCULATURE: Normal.    PELVIC ORGANS: Normal.    MUSCULOSKELETAL: Pectus excavatum deformity redemonstrated. No other significant osseous abnormalities.        Impression    IMPRESSION:     1.  1 mm distal left ureteral stone with mild left hydroureteronephrosis.    2.  Multiple 1 to 2 mm nonobstructing stones in both kidneys, significantly increased in number since prior study.    3.  Pectus excavatum.         Personal Review of Recent Imaging    I personally reviewed the above CT AP w/o and based on my own interpretation - ~2.5mm left UVJ stone with associated mild hydroureteronephrosis, as well as numerous bilateral small nonobstructing stones. This appears to be a significant increase when compared to prior CT from 2022.           Past Medical History:     Past Medical History:   Diagnosis Date     Pectus excavatum             Past Surgical History:     Past Surgical History:   Procedure Laterality Date     ADENOIDECTOMY       MYRINGOTOMY BILATERAL       REMOVE PORT VASCULAR ACCESS N/A 5/26/2021    Procedure: Chest wall hardware removal;  Surgeon: Maya Anaya MD;  Location: [facility] OR     REPAIR PECTUS EXCAVATUM N/A 10/09/2020    Procedure: Modified Ravitch Repair with sternal plating;  Surgeon: Maya Anaya MD;  Location: [facility] OR     TYMPANOPLASTY      harvest facial graft            Medications     Current Outpatient Medications   Medication Sig Dispense Refill     acetaminophen (TYLENOL) 325 MG tablet Take 3 tablets (975 mg) by mouth every 6 hours       ammonium lactate (AMLACTIN) 12 % external cream Apply topically 2 times daily. 385 g 0     clotrimazole (LOTRIMIN) 1 % external cream APPLY TOPICALLY TWICE DAILY 90 g 2     cyclobenzaprine (FLEXERIL) 10 MG tablet TAKE 1 TABLET(10 MG)  BY MOUTH EVERY NIGHT AS NEEDED FOR MUSCLE SPASMS 30 tablet 0     ibuprofen (ADVIL/MOTRIN) 600 MG tablet Take 1 tablet (600 mg) by mouth every 6 hours       No current facility-administered medications for this visit.            Family History:     Family History   Problem Relation Age of Onset     Asthma Mother             Social History:     Social History     Socioeconomic History     Marital status: Single     Spouse name: Not on file     Number of children: Not on file     Years of education: Not on file     Highest education level: Not on file   Occupational History     Not on file   Tobacco Use     Smoking status: Never     Smokeless tobacco: Never   Vaping Use     Vaping status: Never Used   Substance and Sexual Activity     Alcohol use: Not Currently     Comment: none over one month     Drug use: No     Sexual activity: Not on file   Other Topics Concern     Not on file   Social History Narrative     Not on file     Social Drivers of Health     Financial Resource Strain: Low Risk  (2/9/2024)    Financial Resource Strain      Within the past 12 months, have you or your family members you live with been unable to get utilities (heat, electricity) when it was really needed?: No   Food Insecurity: Low Risk  (2/9/2024)    Food Insecurity      Within the past 12 months, did you worry that your food would run out before you got money to buy more?: No      Within the past 12 months, did the food you bought just not last and you didn t have money to get more?: No   Transportation Needs: Low Risk  (2/9/2024)    Transportation Needs      Within the past 12 months, has lack of transportation kept you from medical appointments, getting your medicines, non-medical meetings or appointments, work, or from getting things that you need?: No   Physical Activity: Not on file   Stress: Not on file   Social Connections: Not on file   Interpersonal Safety: Low Risk  (9/13/2024)    Interpersonal Safety      Do you feel physically  and emotionally safe where you currently live?: Yes      Within the past 12 months, have you been hit, slapped, kicked or otherwise physically hurt by someone?: No      Within the past 12 months, have you been humiliated or emotionally abused in other ways by your partner or ex-partner?: No   Housing Stability: Low Risk  (2/9/2024)    Housing Stability      Do you have housing? : Yes      Are you worried about losing your housing?: No            Allergies:   Patient has no known allergies.         Review of Systems:  From intake questionnaire   Negative 14 system review except as noted on HPI, nurse's note.      CC:  Haley Rey      Again, thank you for allowing me to participate in the care of your patient.      Sincerely,    Chencho Joy MD

## 2025-04-08 NOTE — NURSING NOTE
Current patient location: [place]    Is the patient currently in the state of [place]? YES    Visit mode: VIDEO    If the visit is dropped, the patient can be reconnected by:VIDEO VISIT: Text to cell phone:   Telephone Information:   Mobile [phone]       Will anyone else be joining the visit? NO  (If patient encounters technical issues they should call [phone])    Are changes needed to the allergy or medication list? E-check in was reviewed/completed for today's visit. VF did not review e-check in information again with Junior due to this.       Are refills needed on medications prescribed by this physician? Discuss with provider    Rooming Documentation:  Not applicable    Reason for visit: Consult    Janell MAR

## (undated) DEVICE — GLOVE LINER HALF FINGER NYLON KP5015/50

## (undated) DEVICE — DRAIN CHEST TUBE 32FR STR 8032

## (undated) DEVICE — SU VICRYL 0 CT-1 36" J346H

## (undated) DEVICE — GLOVE PROTEXIS BLUE W/NEU-THERA 8.0  2D73EB80

## (undated) DEVICE — SOL NACL 0.9% IRRIG 500ML BOTTLE 2F7123

## (undated) DEVICE — ESU ELEC BLADE 2.75" COATED/INSULATED E1455

## (undated) DEVICE — DRSG ADAPTIC 3X16"  6114

## (undated) DEVICE — DRAPE U SPLIT 74X120" 29440

## (undated) DEVICE — SU PDS II 0 CTX 36" Z370T

## (undated) DEVICE — SU SILK 0 SH 30" K834H

## (undated) DEVICE — SUCTION TIP YANKAUER STR K87

## (undated) DEVICE — SOL NACL 0.9% IRRIG 1000ML BOTTLE 2F7124

## (undated) DEVICE — ADH SKIN CLOSURE PREMIERPRO EXOFIN 1.0ML 3470

## (undated) DEVICE — SPONGE SURGIFOAM 100 1974

## (undated) DEVICE — LINEN TOWEL PACK X6 WHITE 5487

## (undated) DEVICE — PREP CHLORAPREP 26ML TINTED ORANGE  260815

## (undated) DEVICE — SOL WATER IRRIG 1000ML BOTTLE 2F7114

## (undated) DEVICE — SU ETHILON 2-0 FS 18" 664H

## (undated) DEVICE — DRSG TEGADERM 4X4 3/4" 1626W

## (undated) DEVICE — CANISTER WOUND VAC W/GEL 1000ML M8275093/5

## (undated) DEVICE — Device

## (undated) DEVICE — DRAIN JACKSON PRATT RESERVOIR 100ML SU130-1305

## (undated) DEVICE — CATH RED RUBBER 14FR LATEX 0094140

## (undated) DEVICE — SUCTION MANIFOLD NEPTUNE 2 SYS 1 PORT 702-025-000

## (undated) DEVICE — DRAPE LAP TRANSVERSE 29421

## (undated) DEVICE — PACK MINOR CUSTOM ASC

## (undated) DEVICE — PACKING IODOFORM STRIP 1/4" 7831

## (undated) DEVICE — SU SILK 0 TIE 6X30" A306H

## (undated) DEVICE — SU PROLENE 2-0 SHDA 36" 8523H

## (undated) DEVICE — DRAIN JACKSON PRATT ROUND SIL 19FR W/TROCAR LF JP-2232

## (undated) DEVICE — BASIN SET SINGLE STERILE 13752-624

## (undated) DEVICE — SPONGE RAY-TEC 4X8" 7318

## (undated) DEVICE — DRAPE SPLIT SHEET 77X108 REINFORCED 29436

## (undated) DEVICE — LINEN TOWEL PACK X5 5464

## (undated) DEVICE — CLIP HORIZON MED BLUE 002200

## (undated) DEVICE — GLOVE PROTEXIS MICRO 7.5  2D73PM75

## (undated) DEVICE — DRAPE IOBAN INCISE 23X17" 6650EZ

## (undated) DEVICE — DRAPE SHEET REV FOLD 3/4 9349

## (undated) DEVICE — BLADE KNIFE SURG 11 371111

## (undated) DEVICE — BLADE KNIFE SURG 10 371110

## (undated) DEVICE — SU VICRYL 2-0 CT-2 27" UND J269H

## (undated) DEVICE — SYR 10ML LL W/O NDL

## (undated) DEVICE — SPONGE LAP 18X18" X8435

## (undated) DEVICE — SU SILK 0 TIE 18" SA66H

## (undated) DEVICE — SU VICRYL 0 CT-1 27" UND J260H

## (undated) DEVICE — SU VICRYL 4-0 PS-2 18" UND J496H

## (undated) DEVICE — DRSG PRIMAPORE 02X3" 7133

## (undated) DEVICE — SU VICRYL 2-0 CT-1 27" UND J259H

## (undated) DEVICE — SU VICRYL 3-0 SH 27" J316H

## (undated) DEVICE — BONE WAX 2.5GM W31G

## (undated) DEVICE — SU VICRYL 2-0 SH 27" UND J417H

## (undated) DEVICE — KIT INTRODUCER FLUENT MICRO 5FRX10CM ECHO TIP KIT-038-04

## (undated) RX ORDER — IBUPROFEN 200 MG
TABLET ORAL
Status: DISPENSED
Start: 2020-10-09

## (undated) RX ORDER — LIDOCAINE HYDROCHLORIDE 20 MG/ML
INJECTION, SOLUTION EPIDURAL; INFILTRATION; INTRACAUDAL; PERINEURAL
Status: DISPENSED
Start: 2021-05-26

## (undated) RX ORDER — HYDROMORPHONE HYDROCHLORIDE 1 MG/ML
INJECTION, SOLUTION INTRAMUSCULAR; INTRAVENOUS; SUBCUTANEOUS
Status: DISPENSED
Start: 2020-10-09

## (undated) RX ORDER — SODIUM CHLORIDE, SODIUM LACTATE, POTASSIUM CHLORIDE, CALCIUM CHLORIDE 600; 310; 30; 20 MG/100ML; MG/100ML; MG/100ML; MG/100ML
INJECTION, SOLUTION INTRAVENOUS
Status: DISPENSED
Start: 2020-10-09

## (undated) RX ORDER — EPHEDRINE SULFATE 50 MG/ML
INJECTION, SOLUTION INTRAMUSCULAR; INTRAVENOUS; SUBCUTANEOUS
Status: DISPENSED
Start: 2020-10-09

## (undated) RX ORDER — ONDANSETRON 2 MG/ML
INJECTION INTRAMUSCULAR; INTRAVENOUS
Status: DISPENSED
Start: 2020-10-09

## (undated) RX ORDER — CEFAZOLIN SODIUM 1 G/3ML
INJECTION, POWDER, FOR SOLUTION INTRAMUSCULAR; INTRAVENOUS
Status: DISPENSED
Start: 2021-05-26

## (undated) RX ORDER — PROPOFOL 10 MG/ML
INJECTION, EMULSION INTRAVENOUS
Status: DISPENSED
Start: 2021-05-26

## (undated) RX ORDER — FENTANYL CITRATE 50 UG/ML
INJECTION, SOLUTION INTRAMUSCULAR; INTRAVENOUS
Status: DISPENSED
Start: 2020-10-09

## (undated) RX ORDER — ACETAMINOPHEN 325 MG/1
TABLET ORAL
Status: DISPENSED
Start: 2020-10-09

## (undated) RX ORDER — DEXAMETHASONE SODIUM PHOSPHATE 4 MG/ML
INJECTION, SOLUTION INTRA-ARTICULAR; INTRALESIONAL; INTRAMUSCULAR; INTRAVENOUS; SOFT TISSUE
Status: DISPENSED
Start: 2020-10-09

## (undated) RX ORDER — OXYCODONE HYDROCHLORIDE 5 MG/1
TABLET ORAL
Status: DISPENSED
Start: 2020-10-09

## (undated) RX ORDER — LIDOCAINE HYDROCHLORIDE AND EPINEPHRINE 10; 10 MG/ML; UG/ML
INJECTION, SOLUTION INFILTRATION; PERINEURAL
Status: DISPENSED
Start: 2021-05-26

## (undated) RX ORDER — OXYCODONE HYDROCHLORIDE 5 MG/1
TABLET ORAL
Status: DISPENSED
Start: 2021-05-26

## (undated) RX ORDER — PROPOFOL 10 MG/ML
INJECTION, EMULSION INTRAVENOUS
Status: DISPENSED
Start: 2020-10-09

## (undated) RX ORDER — BUPIVACAINE HYDROCHLORIDE 2.5 MG/ML
INJECTION, SOLUTION EPIDURAL; INFILTRATION; INTRACAUDAL
Status: DISPENSED
Start: 2021-05-26

## (undated) RX ORDER — FENTANYL CITRATE 50 UG/ML
INJECTION, SOLUTION INTRAMUSCULAR; INTRAVENOUS
Status: DISPENSED
Start: 2021-05-26

## (undated) RX ORDER — CEFAZOLIN SODIUM 1 G/3ML
INJECTION, POWDER, FOR SOLUTION INTRAMUSCULAR; INTRAVENOUS
Status: DISPENSED
Start: 2020-10-09

## (undated) RX ORDER — CEFAZOLIN SODIUM 2 G/100ML
INJECTION, SOLUTION INTRAVENOUS
Status: DISPENSED
Start: 2020-10-09

## (undated) RX ORDER — GLYCOPYRROLATE 0.2 MG/ML
INJECTION, SOLUTION INTRAMUSCULAR; INTRAVENOUS
Status: DISPENSED
Start: 2020-10-09

## (undated) RX ORDER — DEXAMETHASONE SODIUM PHOSPHATE 10 MG/ML
INJECTION, SOLUTION INTRAMUSCULAR; INTRAVENOUS
Status: DISPENSED
Start: 2021-05-26

## (undated) RX ORDER — ACETAMINOPHEN 325 MG/1
TABLET ORAL
Status: DISPENSED
Start: 2021-05-26

## (undated) RX ORDER — LIDOCAINE HYDROCHLORIDE 20 MG/ML
INJECTION, SOLUTION EPIDURAL; INFILTRATION; INTRACAUDAL; PERINEURAL
Status: DISPENSED
Start: 2020-10-09

## (undated) RX ORDER — GABAPENTIN 300 MG/1
CAPSULE ORAL
Status: DISPENSED
Start: 2021-05-26

## (undated) RX ORDER — EPHEDRINE SULFATE 50 MG/ML
INJECTION, SOLUTION INTRAMUSCULAR; INTRAVENOUS; SUBCUTANEOUS
Status: DISPENSED
Start: 2021-05-26

## (undated) RX ORDER — FENTANYL CITRATE-0.9 % NACL/PF 10 MCG/ML
PLASTIC BAG, INJECTION (ML) INTRAVENOUS
Status: DISPENSED
Start: 2020-10-09

## (undated) RX ORDER — ONDANSETRON 2 MG/ML
INJECTION INTRAMUSCULAR; INTRAVENOUS
Status: DISPENSED
Start: 2021-05-26